# Patient Record
Sex: FEMALE | Race: BLACK OR AFRICAN AMERICAN | Employment: UNEMPLOYED | URBAN - METROPOLITAN AREA
[De-identification: names, ages, dates, MRNs, and addresses within clinical notes are randomized per-mention and may not be internally consistent; named-entity substitution may affect disease eponyms.]

---

## 2016-08-01 LAB
ANTIBODY SCREEN, EXTERNAL: NEGATIVE
HBSAG, EXTERNAL: NEGATIVE
HIV, EXTERNAL: NORMAL
RPR, EXTERNAL: NON REACTIVE
RUBELLA, EXTERNAL: NORMAL
TYPE, ABO & RH, EXTERNAL: NORMAL

## 2017-01-04 ENCOUNTER — HOSPITAL ENCOUNTER (EMERGENCY)
Age: 32
Discharge: HOME | End: 2017-01-04
Attending: EMERGENCY MEDICINE
Payer: MEDICAID

## 2017-01-04 VITALS
SYSTOLIC BLOOD PRESSURE: 136 MMHG | DIASTOLIC BLOOD PRESSURE: 76 MMHG | OXYGEN SATURATION: 100 % | TEMPERATURE: 97.6 F | HEIGHT: 66 IN | WEIGHT: 253.75 LBS | RESPIRATION RATE: 16 BRPM | BODY MASS INDEX: 40.78 KG/M2 | HEART RATE: 90 BPM

## 2017-01-04 DIAGNOSIS — R04.0 NOSEBLEED: ICD-10-CM

## 2017-01-04 DIAGNOSIS — J20.9 ACUTE BRONCHITIS, UNSPECIFIED ORGANISM: Primary | ICD-10-CM

## 2017-01-04 PROCEDURE — 94640 AIRWAY INHALATION TREATMENT: CPT

## 2017-01-04 PROCEDURE — 77030029684 HC NEB SM VOL KT MONA -A

## 2017-01-04 PROCEDURE — 99283 EMERGENCY DEPT VISIT LOW MDM: CPT

## 2017-01-04 PROCEDURE — 74011000250 HC RX REV CODE- 250: Performed by: PHYSICIAN ASSISTANT

## 2017-01-04 RX ORDER — CODEINE PHOSPHATE AND GUAIFENESIN 10; 100 MG/5ML; MG/5ML
5 SOLUTION ORAL
Qty: 120 ML | Refills: 0 | Status: SHIPPED | OUTPATIENT
Start: 2017-01-04 | End: 2017-01-11 | Stop reason: SDUPTHER

## 2017-01-04 RX ORDER — AZITHROMYCIN 250 MG/1
TABLET, FILM COATED ORAL
Qty: 6 TAB | Refills: 0 | Status: SHIPPED | OUTPATIENT
Start: 2017-01-04 | End: 2017-01-11 | Stop reason: SDUPTHER

## 2017-01-04 RX ORDER — IPRATROPIUM BROMIDE AND ALBUTEROL SULFATE 2.5; .5 MG/3ML; MG/3ML
3 SOLUTION RESPIRATORY (INHALATION)
Status: COMPLETED | OUTPATIENT
Start: 2017-01-04 | End: 2017-01-04

## 2017-01-04 RX ADMIN — IPRATROPIUM BROMIDE AND ALBUTEROL SULFATE 3 ML: .5; 3 SOLUTION RESPIRATORY (INHALATION) at 15:08

## 2017-01-04 NOTE — ED NOTES
WAYNE Millan has reviewed discharge instructions with the patient. The patient verbalized understanding. Patient ambulatory to car.

## 2017-01-04 NOTE — DISCHARGE INSTRUCTIONS
Bronchitis: Care Instructions  Your Care Instructions    Bronchitis is inflammation of the bronchial tubes, which carry air to the lungs. The tubes swell and produce mucus, or phlegm. The mucus and inflamed bronchial tubes make you cough. You may have trouble breathing. Most cases of bronchitis are caused by viruses like those that cause colds. Antibiotics usually do not help and they may be harmful. Bronchitis usually develops rapidly and lasts about 2 to 3 weeks in otherwise healthy people. Follow-up care is a key part of your treatment and safety. Be sure to make and go to all appointments, and call your doctor if you are having problems. It's also a good idea to know your test results and keep a list of the medicines you take. How can you care for yourself at home? · Take all medicines exactly as prescribed. Call your doctor if you think you are having a problem with your medicine. · Get some extra rest.  · Take an over-the-counter pain medicine, such as acetaminophen (Tylenol), ibuprofen (Advil, Motrin), or naproxen (Aleve) to reduce fever and relieve body aches. Read and follow all instructions on the label. · Do not take two or more pain medicines at the same time unless the doctor told you to. Many pain medicines have acetaminophen, which is Tylenol. Too much acetaminophen (Tylenol) can be harmful. · Take an over-the-counter cough medicine that contains dextromethorphan to help quiet a dry, hacking cough so that you can sleep. Avoid cough medicines that have more than one active ingredient. Read and follow all instructions on the label. · Breathe moist air from a humidifier, hot shower, or sink filled with hot water. The heat and moisture will thin mucus so you can cough it out. · Do not smoke. Smoking can make bronchitis worse. If you need help quitting, talk to your doctor about stop-smoking programs and medicines. These can increase your chances of quitting for good.   When should you call for help? Call 911 anytime you think you may need emergency care. For example, call if:  · You have severe trouble breathing. Call your doctor now or seek immediate medical care if:  · You have new or worse trouble breathing. · You cough up dark brown or bloody mucus (sputum). · You have a new or higher fever. · You have a new rash. Watch closely for changes in your health, and be sure to contact your doctor if:  · You cough more deeply or more often, especially if you notice more mucus or a change in the color of your mucus. · You are not getting better as expected. Where can you learn more? Go to http://sheree-frank.info/. Enter H333 in the search box to learn more about \"Bronchitis: Care Instructions. \"  Current as of: May 23, 2016  Content Version: 11.1  © 2462-9835 [employer]. Care instructions adapted under license by [employer] (which disclaims liability or warranty for this information). If you have questions about a medical condition or this instruction, always ask your healthcare professional. [place] [address] any warranty or liability for your use of this information. Nosebleeds: Care Instructions  Your Care Instructions    Nosebleeds are common, especially if you have colds or allergies. Many things can cause a nosebleed. Some nosebleeds stop on their own with pressure. Others need packing. Some get cauterized (sealed). If you have gauze or other packing materials in your nose, you will need to follow up with your doctor to have the packing removed. You may need more treatment if you get nosebleeds a lot. The doctor has checked you carefully, but problems can develop later. If you notice any problems or new symptoms, get medical treatment right away. Follow-up care is a key part of your treatment and safety. Be sure to make and go to all appointments, and call your doctor if you are having problems.  It's also a good idea to know your test results and keep a list of the medicines you take. How can you care for yourself at home? · If you get another nosebleed:  ¨ Sit up and tilt your head slightly forward. This keeps blood from going down your throat. ¨ Use your thumb and index finger to pinch your nose shut for 10 minutes. Use a clock. Do not check to see if the bleeding has stopped before the 10 minutes are up. If the bleeding has not stopped, pinch your nose shut for another 10 minutes. ¨ When the bleeding has stopped, try not to pick, rub, or blow your nose for 12 hours. Avoiding these things helps keep your nose from bleeding again. · If your doctor prescribed antibiotics, take them as directed. Do not stop taking them just because you feel better. You need to take the full course of antibiotics. To prevent nosebleeds  · Do not blow your nose too hard. · Try not to lift or strain after a nosebleed. · Raise your head on a pillow while you sleep. · Put a thin layer of a saline- or water-based nasal gel, such as NasoGel, inside your nose. Put it on the septum, which divides your nostrils. This will prevent dryness that can cause nosebleeds. · Use a vaporizer or humidifier to add moisture to your bedroom. Follow the directions for cleaning the machine. · Do not use aspirin, ibuprofen (Advil, Motrin), or naproxen (Aleve) for 36 to 48 hours after a nosebleed unless your doctor tells you to. You can use acetaminophen (Tylenol) for pain relief. · Talk to your doctor about stopping any other medicines you are taking. Some medicines may make you more likely to get a nosebleed. · Do not use cold medicines or nasal sprays without first talking to your doctor. They can make your nose dry. When should you call for help? Call 911 anytime you think you may need emergency care. For example, call if:  · You passed out (lost consciousness).   Call your doctor now or seek immediate medical care if:  · You get another nosebleed and your nose is still bleeding after you have applied pressure 3 times for 10 minutes each time (30 minutes total). · There is a lot of blood running down the back of your throat even after you pinch your nose and tilt your head forward. · You have a fever. · You have sinus pain. Watch closely for changes in your health, and be sure to contact your doctor if:  · You get nosebleeds often, even if they stop. · You do not get better as expected. Where can you learn more? Go to http://sheree-frank.info/. Enter S156 in the search box to learn more about \"Nosebleeds: Care Instructions. \"  Current as of: May 27, 2016  Content Version: 11.1  © 9702-7682 [employer]. Care instructions adapted under license by [employer] (which disclaims liability or warranty for this information). If you have questions about a medical condition or this instruction, always ask your healthcare professional. [place] [address] any warranty or liability for your use of this information.

## 2017-01-04 NOTE — ED NOTES
Patient c/o 3 day h/o intermittent right nare epistaxis with clots. Patient also with shortness of breath and productive cough. Patient 7 months pregnant, c/o abdominal \"aching\" to bilateral sides.

## 2017-01-05 ENCOUNTER — HOSPITAL ENCOUNTER (INPATIENT)
Age: 32
LOS: 2 days | Discharge: HOME | DRG: 566 | End: 2017-01-09
Attending: EMERGENCY MEDICINE | Admitting: OBSTETRICS & GYNECOLOGY
Payer: MEDICAID

## 2017-01-05 DIAGNOSIS — Z3A.28 28 WEEKS GESTATION OF PREGNANCY: ICD-10-CM

## 2017-01-05 DIAGNOSIS — R04.0 EPISTAXIS: Primary | ICD-10-CM

## 2017-01-05 DIAGNOSIS — J45.41 MODERATE PERSISTENT ASTHMA WITH ACUTE EXACERBATION: ICD-10-CM

## 2017-01-05 LAB
ALBUMIN SERPL BCP-MCNC: 2.4 G/DL (ref 3.5–5)
ALBUMIN/GLOB SERPL: 0.6 {RATIO} (ref 1.1–2.2)
ALP SERPL-CCNC: 76 U/L (ref 45–117)
ALT SERPL-CCNC: 69 U/L (ref 12–78)
ANION GAP BLD CALC-SCNC: 8 MMOL/L (ref 5–15)
APPEARANCE UR: ABNORMAL
AST SERPL W P-5'-P-CCNC: 29 U/L (ref 15–37)
BACTERIA URNS QL MICRO: ABNORMAL /HPF
BASOPHILS # BLD AUTO: 0 K/UL (ref 0–0.1)
BASOPHILS # BLD: 0 % (ref 0–1)
BILIRUB SERPL-MCNC: 0.4 MG/DL (ref 0.2–1)
BILIRUB UR QL: NEGATIVE
BUN SERPL-MCNC: 5 MG/DL (ref 6–20)
BUN/CREAT SERPL: 9 (ref 12–20)
CALCIUM SERPL-MCNC: 8.6 MG/DL (ref 8.5–10.1)
CHLORIDE SERPL-SCNC: 106 MMOL/L (ref 97–108)
CO2 SERPL-SCNC: 27 MMOL/L (ref 21–32)
COLOR UR: ABNORMAL
CREAT SERPL-MCNC: 0.53 MG/DL (ref 0.55–1.02)
EOSINOPHIL # BLD: 0.1 K/UL (ref 0–0.4)
EOSINOPHIL NFR BLD: 1 % (ref 0–7)
EPITH CASTS URNS QL MICRO: ABNORMAL /LPF
ERYTHROCYTE [DISTWIDTH] IN BLOOD BY AUTOMATED COUNT: 14 % (ref 11.5–14.5)
FLUAV AG NPH QL IA: NEGATIVE
FLUBV AG NOSE QL IA: NEGATIVE
GLOBULIN SER CALC-MCNC: 4.2 G/DL (ref 2–4)
GLUCOSE BLD STRIP.AUTO-MCNC: 233 MG/DL (ref 65–100)
GLUCOSE SERPL-MCNC: 112 MG/DL (ref 65–100)
GLUCOSE UR STRIP.AUTO-MCNC: NEGATIVE MG/DL
HCT VFR BLD AUTO: 29.8 % (ref 35–47)
HGB BLD-MCNC: 9.2 G/DL (ref 11.5–16)
HGB UR QL STRIP: NEGATIVE
HYALINE CASTS URNS QL MICRO: ABNORMAL /LPF (ref 0–5)
INR PPP: 0.9 (ref 0.9–1.1)
KETONES UR QL STRIP.AUTO: NEGATIVE MG/DL
LEUKOCYTE ESTERASE UR QL STRIP.AUTO: NEGATIVE
LYMPHOCYTES # BLD AUTO: 26 % (ref 12–49)
LYMPHOCYTES # BLD: 2.8 K/UL (ref 0.8–3.5)
MCH RBC QN AUTO: 24.8 PG (ref 26–34)
MCHC RBC AUTO-ENTMCNC: 30.9 G/DL (ref 30–36.5)
MCV RBC AUTO: 80.3 FL (ref 80–99)
MONOCYTES # BLD: 0.6 K/UL (ref 0–1)
MONOCYTES NFR BLD AUTO: 6 % (ref 5–13)
NEUTS SEG # BLD: 7.3 K/UL (ref 1.8–8)
NEUTS SEG NFR BLD AUTO: 67 % (ref 32–75)
NITRITE UR QL STRIP.AUTO: NEGATIVE
PH UR STRIP: 7.5 [PH] (ref 5–8)
PLATELET # BLD AUTO: 272 K/UL (ref 150–400)
POTASSIUM SERPL-SCNC: 3.9 MMOL/L (ref 3.5–5.1)
PROT SERPL-MCNC: 6.6 G/DL (ref 6.4–8.2)
PROT UR STRIP-MCNC: NEGATIVE MG/DL
PROTHROMBIN TIME: 9.1 SEC (ref 9–11.1)
RBC # BLD AUTO: 3.71 M/UL (ref 3.8–5.2)
RBC #/AREA URNS HPF: ABNORMAL /HPF (ref 0–5)
SERVICE CMNT-IMP: ABNORMAL
SODIUM SERPL-SCNC: 141 MMOL/L (ref 136–145)
SP GR UR REFRACTOMETRY: 1.01 (ref 1–1.03)
UA: UC IF INDICATED,UAUC: ABNORMAL
UROBILINOGEN UR QL STRIP.AUTO: 1 EU/DL (ref 0.2–1)
WBC # BLD AUTO: 10.9 K/UL (ref 3.6–11)
WBC URNS QL MICRO: ABNORMAL /HPF (ref 0–4)

## 2017-01-05 PROCEDURE — 74011250636 HC RX REV CODE- 250/636: Performed by: EMERGENCY MEDICINE

## 2017-01-05 PROCEDURE — 74011636637 HC RX REV CODE- 636/637: Performed by: EMERGENCY MEDICINE

## 2017-01-05 PROCEDURE — 74011000250 HC RX REV CODE- 250: Performed by: EMERGENCY MEDICINE

## 2017-01-05 PROCEDURE — 99285 EMERGENCY DEPT VISIT HI MDM: CPT

## 2017-01-05 PROCEDURE — 94640 AIRWAY INHALATION TREATMENT: CPT

## 2017-01-05 PROCEDURE — 81001 URINALYSIS AUTO W/SCOPE: CPT | Performed by: EMERGENCY MEDICINE

## 2017-01-05 PROCEDURE — 87086 URINE CULTURE/COLONY COUNT: CPT | Performed by: EMERGENCY MEDICINE

## 2017-01-05 PROCEDURE — 74011636637 HC RX REV CODE- 636/637: Performed by: INTERNAL MEDICINE

## 2017-01-05 PROCEDURE — 77030013140 HC MSK NEB VYRM -A

## 2017-01-05 PROCEDURE — 74011250637 HC RX REV CODE- 250/637: Performed by: EMERGENCY MEDICINE

## 2017-01-05 PROCEDURE — 82962 GLUCOSE BLOOD TEST: CPT

## 2017-01-05 PROCEDURE — 99218 HC RM OBSERVATION: CPT

## 2017-01-05 PROCEDURE — 85610 PROTHROMBIN TIME: CPT | Performed by: INTERNAL MEDICINE

## 2017-01-05 PROCEDURE — 85025 COMPLETE CBC W/AUTO DIFF WBC: CPT | Performed by: EMERGENCY MEDICINE

## 2017-01-05 PROCEDURE — 96365 THER/PROPH/DIAG IV INF INIT: CPT

## 2017-01-05 PROCEDURE — 4A1HX4Z MONITORING OF PRODUCTS OF CONCEPTION, CARDIAC ELECTRICAL ACTIVITY, EXTERNAL APPROACH: ICD-10-PCS | Performed by: SPECIALIST

## 2017-01-05 PROCEDURE — 74011000250 HC RX REV CODE- 250: Performed by: INTERNAL MEDICINE

## 2017-01-05 PROCEDURE — 36415 COLL VENOUS BLD VENIPUNCTURE: CPT | Performed by: EMERGENCY MEDICINE

## 2017-01-05 PROCEDURE — 87804 INFLUENZA ASSAY W/OPTIC: CPT | Performed by: EMERGENCY MEDICINE

## 2017-01-05 PROCEDURE — 96372 THER/PROPH/DIAG INJ SC/IM: CPT

## 2017-01-05 PROCEDURE — 80053 COMPREHEN METABOLIC PANEL: CPT | Performed by: EMERGENCY MEDICINE

## 2017-01-05 RX ORDER — MAGNESIUM SULFATE 100 %
4 CRYSTALS MISCELLANEOUS AS NEEDED
Status: DISCONTINUED | OUTPATIENT
Start: 2017-01-05 | End: 2017-01-09 | Stop reason: HOSPADM

## 2017-01-05 RX ORDER — ALBUTEROL SULFATE 0.83 MG/ML
5 SOLUTION RESPIRATORY (INHALATION)
Status: COMPLETED | OUTPATIENT
Start: 2017-01-05 | End: 2017-01-05

## 2017-01-05 RX ORDER — OXYMETAZOLINE HCL 0.05 %
2 SPRAY, NON-AEROSOL (ML) NASAL
Status: COMPLETED | OUTPATIENT
Start: 2017-01-05 | End: 2017-01-05

## 2017-01-05 RX ORDER — DEXTROSE 50 % IN WATER (D50W) INTRAVENOUS SYRINGE
12.5-25 AS NEEDED
Status: DISCONTINUED | OUTPATIENT
Start: 2017-01-05 | End: 2017-01-09 | Stop reason: HOSPADM

## 2017-01-05 RX ORDER — SODIUM CHLORIDE 0.9 % (FLUSH) 0.9 %
5-10 SYRINGE (ML) INJECTION EVERY 8 HOURS
Status: DISCONTINUED | OUTPATIENT
Start: 2017-01-05 | End: 2017-01-09 | Stop reason: HOSPADM

## 2017-01-05 RX ORDER — PREDNISONE 10 MG/1
40 TABLET ORAL
Status: COMPLETED | OUTPATIENT
Start: 2017-01-05 | End: 2017-01-05

## 2017-01-05 RX ORDER — ALBUTEROL SULFATE 0.83 MG/ML
2.5 SOLUTION RESPIRATORY (INHALATION)
Status: COMPLETED | OUTPATIENT
Start: 2017-01-05 | End: 2017-01-05

## 2017-01-05 RX ORDER — MAGNESIUM SULFATE HEPTAHYDRATE 40 MG/ML
2 INJECTION, SOLUTION INTRAVENOUS
Status: COMPLETED | OUTPATIENT
Start: 2017-01-05 | End: 2017-01-05

## 2017-01-05 RX ORDER — ACETAMINOPHEN 325 MG/1
650 TABLET ORAL
Status: COMPLETED | OUTPATIENT
Start: 2017-01-05 | End: 2017-01-05

## 2017-01-05 RX ORDER — SODIUM CHLORIDE 0.9 % (FLUSH) 0.9 %
5-10 SYRINGE (ML) INJECTION AS NEEDED
Status: DISCONTINUED | OUTPATIENT
Start: 2017-01-05 | End: 2017-01-09 | Stop reason: HOSPADM

## 2017-01-05 RX ORDER — INSULIN LISPRO 100 [IU]/ML
INJECTION, SOLUTION INTRAVENOUS; SUBCUTANEOUS
Status: DISCONTINUED | OUTPATIENT
Start: 2017-01-05 | End: 2017-01-06

## 2017-01-05 RX ORDER — ACETAMINOPHEN 325 MG/1
650 TABLET ORAL
Status: DISCONTINUED | OUTPATIENT
Start: 2017-01-05 | End: 2017-01-09 | Stop reason: HOSPADM

## 2017-01-05 RX ORDER — ZOLPIDEM TARTRATE 5 MG/1
5 TABLET ORAL
Status: DISCONTINUED | OUTPATIENT
Start: 2017-01-05 | End: 2017-01-09 | Stop reason: HOSPADM

## 2017-01-05 RX ORDER — ALBUTEROL SULFATE 0.83 MG/ML
1.25 SOLUTION RESPIRATORY (INHALATION)
Status: DISCONTINUED | OUTPATIENT
Start: 2017-01-05 | End: 2017-01-09 | Stop reason: HOSPADM

## 2017-01-05 RX ADMIN — ALBUTEROL SULFATE 2.5 MG: 2.5 SOLUTION RESPIRATORY (INHALATION) at 15:56

## 2017-01-05 RX ADMIN — ACETAMINOPHEN 650 MG: 325 TABLET, FILM COATED ORAL at 15:55

## 2017-01-05 RX ADMIN — ALBUTEROL SULFATE 5 MG: 2.5 SOLUTION RESPIRATORY (INHALATION) at 17:59

## 2017-01-05 RX ADMIN — PREDNISONE 40 MG: 10 TABLET ORAL at 17:54

## 2017-01-05 RX ADMIN — ALBUTEROL SULFATE 1.25 MG: 2.5 SOLUTION RESPIRATORY (INHALATION) at 21:42

## 2017-01-05 RX ADMIN — SODIUM CHLORIDE 1000 ML: 900 INJECTION, SOLUTION INTRAVENOUS at 19:01

## 2017-01-05 RX ADMIN — INSULIN LISPRO 3 UNITS: 100 INJECTION, SOLUTION INTRAVENOUS; SUBCUTANEOUS at 22:40

## 2017-01-05 RX ADMIN — OXYMETAZOLINE HYDROCHLORIDE 2 SPRAY: 5 SPRAY NASAL at 18:27

## 2017-01-05 RX ADMIN — ALBUTEROL SULFATE 2.5 MG: 2.5 SOLUTION RESPIRATORY (INHALATION) at 19:01

## 2017-01-05 RX ADMIN — MAGNESIUM SULFATE HEPTAHYDRATE 2 G: 40 INJECTION, SOLUTION INTRAVENOUS at 19:12

## 2017-01-05 NOTE — Clinical Note
Patient Class[de-identified] Observation [353] Type of Bed: L&D [7] Reason for Observation: Asthma , nose bleed Admitting Physician: Rachel Villatoro [[id number]] Attending Physician: Rachel Villatoro [[id number]] Diagnosis: 28 weeks GA, Epistac

## 2017-01-05 NOTE — IP AVS SNAPSHOT
Current Discharge Medication List  
  
ASK your doctor about these medications Dose & Instructions Dispensing Information Comments Morning Noon Evening Bedtime  
 albuterol 90 mcg/actuation inhaler Commonly known as:  PROAIR HFA Your next dose is: Today, Tomorrow Other:  ____________ Dose:  2 Puff Take 2 Puffs by inhalation every four (4) hours as needed for Wheezing. Quantity:  1 Inhaler Refills:  5  
     
   
   
   
  
 aspirin 81 mg chewable tablet Your next dose is: Today, Tomorrow Other:  ____________ Dose:  81 mg Take 1 Tab by mouth daily. Quantity:  30 Tab Refills:  5  
     
   
   
   
  
 azithromycin 250 mg tablet Commonly known as:  Gaetana Masker Your next dose is: Today, Tomorrow Other:  ____________ Take 2 tabs today; then take 1 tab daily for 4 days Quantity:  6 Tab Refills:  0 Blood-Glucose Meter monitoring kit Your next dose is: Today, Tomorrow Other:  ____________ Use up to three times per days Quantity:  1 Kit Refills:  0  
     
   
   
   
  
 diphenoxylate-atropine 2.5-0.025 mg per tablet Commonly known as:  LOMOTIL Your next dose is: Today, Tomorrow Other:  ____________ Dose:  1 Tab Take 1 Tab by mouth four (4) times daily as needed for Diarrhea. Max Daily Amount: 4 Tabs. Quantity:  20 Tab Refills:  0  
     
   
   
   
  
 glucose blood VI test strips strip Commonly known as:  ASCENSIA AUTODISC VI, ONE TOUCH ULTRA TEST VI Your next dose is: Today, Tomorrow Other:  ____________ Use up to four times daily Quantity:  100 Strip Refills:  5  
     
   
   
   
  
 glyBURIDE 1.25 mg tablet Commonly known as:  [place] Your next dose is: Today, Tomorrow Other:  ____________ Take 1/2 pill if you have an evening snack Quantity:  30 Tab Refills:  3  
     
   
   
   
  
 guaiFENesin-codeine 100-10 mg/5 mL solution Commonly known as:  ROBITUSSIN AC Your next dose is: Today, Tomorrow Other:  ____________ Dose:  5 mL Take 5 mL by mouth four (4) times daily as needed for Cough for up to 7 days. Max Daily Amount: 20 mL. Quantity:  120 mL Refills:  0  
     
   
   
   
  
 labetalol 100 mg tablet Commonly known as:  Yaritza Runner Your next dose is: Today, Tomorrow Other:  ____________ Dose:  100 mg Take 100 mg by mouth two (2) times a day. Refills:  0 Lancets Misc Your next dose is: Today, Tomorrow Other:  ____________ Use up to three times per days;  
 Quantity:  1 Each Refills:  11  
     
   
   
   
  
 metFORMIN 1,000 mg tablet Commonly known as:  GLUCOPHAGE Your next dose is: Today, Tomorrow Other:  ____________ Dose:  1000 mg Take 1 Tab by mouth two (2) times a day. Quantity:  60 Tab Refills:  5 PEN NEEDLE 31 gauge x 5/16\" Ndle Generic drug:  Insulin Needles (Disposable) Your next dose is: Today, Tomorrow Other:  ____________ Refills:  0  
     
   
   
   
  
 prenatal vitamin 27 mg iron- 800 mcg Tab tablet Your next dose is: Today, Tomorrow Other:  ____________  
   
   
 daily. Refills:  0 SITagliptin 100 mg tablet Commonly known as:  Tierra Racer Your next dose is: Today, Tomorrow Other:  ____________ Dose:  100 mg Take 1 Tab by mouth daily. Appointment needed to refill this medication again. Quantity:  30 Tab Refills:  5

## 2017-01-05 NOTE — IP AVS SNAPSHOT
Summary of Care Report The Summary of Care report has been created to help improve care coordination. Users with access to [employer] or [address] (Web-based application) may access additional patient information including the Discharge Summary. If you are not currently a [address] user and need more information, please call the number listed below in the [place] [address] section and ask to be connected with Medical Records. Facility Information Name Address Phone [address] [address] [phone] Patient Information Patient Name Sex  Prachi Pratt ([id number]) Female 1985 Discharge Information Admitting Provider Service Area Unit Tess Chen MD / [phone] [address] [facility] [address] [facility] / [phone] Discharge Provider Discharge Date/Time Discharge Disposition Destination Laurel Nunes MD / [phone] 2017 (Pending) AHR (none) Patient Language Language ENGLISH [13] Problem List as of 2017  Date Reviewed: 2016 Codes Priority Class Noted - Resolved Hyperglycemia due to type 2 diabetes mellitus ([facility] ICD-10-CM: E11.65 ICD-9-CM: 250.00   3/23/2016 - Present Hypertension ICD-10-CM: I10 
ICD-9-CM: 401.9   3/23/2016 - Present Asthma ICD-10-CM: D53.410 ICD-9-CM: 493.90   3/23/2016 - Present Bleeding nose ICD-10-CM: R04.0 ICD-9-CM: 784.7   2017 - Present You are allergic to the following Allergen Reactions Mirapex (Pramipexole) Drowsiness Morphine Rash Tramadol Other (comments)  
 headache Current Discharge Medication List  
  
ASK your doctor about these medications Dose & Instructions Dispensing Information Comments  
 albuterol 90 mcg/actuation inhaler Commonly known as:  PROAIR HFA  
 Dose:  2 Puff Take 2 Puffs by inhalation every four (4) hours as needed for Wheezing. Quantity:  1 Inhaler Refills:  5  
   
 aspirin 81 mg chewable tablet Dose:  81 mg Take 1 Tab by mouth daily. Quantity:  30 Tab Refills:  5  
   
 azithromycin 250 mg tablet Commonly known as:  Cate Smith Take 2 tabs today; then take 1 tab daily for 4 days Quantity:  6 Tab Refills:  0 Blood-Glucose Meter monitoring kit Use up to three times per days Quantity:  1 Kit Refills:  0  
   
 diphenoxylate-atropine 2.5-0.025 mg per tablet Commonly known as:  LOMOTIL Dose:  1 Tab Take 1 Tab by mouth four (4) times daily as needed for Diarrhea. Max Daily Amount: 4 Tabs. Quantity:  20 Tab Refills:  0  
   
 glucose blood VI test strips strip Commonly known as:  ASCENSIA AUTODISC VI, ONE TOUCH ULTRA TEST VI  
 Use up to four times daily Quantity:  100 Strip Refills:  5  
   
 glyBURIDE 1.25 mg tablet Commonly known as:  [place] Take 1/2 pill if you have an evening snack Quantity:  30 Tab Refills:  3  
   
 guaiFENesin-codeine 100-10 mg/5 mL solution Commonly known as:  ROBITUSSIN AC Dose:  5 mL Take 5 mL by mouth four (4) times daily as needed for Cough for up to 7 days. Max Daily Amount: 20 mL. Quantity:  120 mL Refills:  0  
   
 labetalol 100 mg tablet Commonly known as:  Loera Corns Dose:  100 mg Take 100 mg by mouth two (2) times a day. Refills:  0 Lancets Misc Use up to three times per days;  
 Quantity:  1 Each Refills:  11  
   
 metFORMIN 1,000 mg tablet Commonly known as:  GLUCOPHAGE Dose:  1000 mg Take 1 Tab by mouth two (2) times a day. Quantity:  60 Tab Refills:  5 PEN NEEDLE 31 gauge x 5/16\" Ndle Generic drug:  Insulin Needles (Disposable) Refills:  0  
   
 prenatal vitamin 27 mg iron- 800 mcg Tab tablet  
 daily. Refills:  0 SITagliptin 100 mg tablet Commonly known as:  Stan Dick Dose:  100 mg Take 1 Tab by mouth daily. Appointment needed to refill this medication again. Quantity:  30 Tab Refills:  5 Current Immunizations Name Date Tdap 2016 Follow-up Information Follow up With Details Comments Contact Info Provider Unknown   Patient not available to ask Valery Mckinley MD   [address] [facility] [place] 
[phone] Discharge Instructions Weeks 26 to 30 of Your Pregnancy: Care Instructions Your Care Instructions You are now in your last trimester of pregnancy. Your baby is growing rapidly. And you'll probably feel your baby moving around more often. Your doctor may ask you to count your baby's kicks. Your back may ache as your body gets used to your baby's size and length. If you haven't already had the Tdap shot during this pregnancy, talk to your doctor about getting it. It will help protect your  against pertussis infection. During this time, it's important to take care of yourself and pay attention to what your body needs. If you feel sexual, explore ways to be close with your partner that match your comfort and desire. Use the tips provided in this care sheet to find ways to be sexual in your own way. Follow-up care is a key part of your treatment and safety. Be sure to make and go to all appointments, and call your doctor if you are having problems. It's also a good idea to know your test results and keep a list of the medicines you take. How can you care for yourself at home? Take it easy at work · Take frequent breaks. If possible, stop working when you are tired, and rest during your lunch hour. · Take bathroom breaks every 2 hours. · Change positions often. If you sit for long periods, stand up and walk around. · When you stand for a long time, keep one foot on a low stool with your knee bent. After standing a lot, sit with your feet up. · Avoid fumes, chemicals, and tobacco smoke. Be sexual in your own way · Having sex during pregnancy is okay, unless your doctor tells you not to. · You may be very interested in sex, or you may have no interest at all. · Your growing belly can make it hard to find a good position during intercourse. [place] and explore. · You may get cramps in your uterus when your partner touches your breasts. · A back rub may relieve the backache or cramps that sometimes follow orgasm. Learn about  labor · Watch for signs of  labor. You may be going into labor if: 
¨ You have menstrual-like cramps, with or without nausea. ¨ You have about 4 or more contractions in 20 minutes, or about 8 or more within 1 hour, even after you have had a glass of water and are resting. ¨ You have a low, dull backache that does not go away when you change your position. ¨ You have pain or pressure in your pelvis that comes and goes in a pattern. ¨ You have intestinal cramping or flu-like symptoms, with or without diarrhea. ¨ You notice an increase or change in your vaginal discharge. Discharge may be heavy, mucus-like, watery, or streaked with blood. ¨ Your water breaks. · If you think you have  labor: ¨ Drink 2 or 3 glasses of water or juice. Not drinking enough fluids can cause contractions. ¨ Stop what you are doing, and empty your bladder. Then lie down on your left side for at least 1 hour. ¨ While lying on your side, find your breast bone. Put your fingers in the soft spot just below it. Move your fingers down toward your belly button to find the top of your uterus. Check to see if it is tight. ¨ Contractions can be weak or strong. Record your contractions for an hour. Time a contraction from the start of one contraction to the start of the next one. ¨ Single or several strong contractions without a pattern are called [place]garry contractions.  They are practice contractions but not the start of labor. They often stop if you change what you are doing. ¨ Call your doctor if you have regular contractions. Where can you learn more? Go to http://sheree-frank.info/. Enter K172 in the search box to learn more about \"Weeks 26 to 30 of Your Pregnancy: Care Instructions. \" Current as of: May 30, 2016 Content Version: 11.1 © 1039-6184 [employer]. Care instructions adapted under license by [employer] (which disclaims liability or warranty for this information). If you have questions about a medical condition or this instruction, always ask your healthcare professional. [place] [address] any warranty or liability for your use of this information. Nosebleeds: Care Instructions Your Care Instructions Nosebleeds are common, especially if you have colds or allergies. Many things can cause a nosebleed. Some nosebleeds stop on their own with pressure. Others need packing. Some get cauterized (sealed). If you have gauze or other packing materials in your nose, you will need to follow up with your doctor to have the packing removed. You may need more treatment if you get nosebleeds a lot. The doctor has checked you carefully, but problems can develop later. If you notice any problems or new symptoms, get medical treatment right away. Follow-up care is a key part of your treatment and safety. Be sure to make and go to all appointments, and call your doctor if you are having problems. It's also a good idea to know your test results and keep a list of the medicines you take. How can you care for yourself at home? · If you get another nosebleed: ¨ Sit up and tilt your head slightly forward. This keeps blood from going down your throat. ¨ Use your thumb and index finger to pinch your nose shut for 10 minutes. Use a clock.  Do not check to see if the bleeding has stopped before the 10 minutes are up. If the bleeding has not stopped, pinch your nose shut for another 10 minutes. ¨ When the bleeding has stopped, try not to pick, rub, or blow your nose for 12 hours. Avoiding these things helps keep your nose from bleeding again. · If your doctor prescribed antibiotics, take them as directed. Do not stop taking them just because you feel better. You need to take the full course of antibiotics. To prevent nosebleeds · Do not blow your nose too hard. · Try not to lift or strain after a nosebleed. · Raise your head on a pillow while you sleep. · Put a thin layer of a saline- or water-based nasal gel, such as NasoGel, inside your nose. Put it on the septum, which divides your nostrils. This will prevent dryness that can cause nosebleeds. · Use a vaporizer or humidifier to add moisture to your bedroom. Follow the directions for cleaning the machine. · Do not use aspirin, ibuprofen (Advil, Motrin), or naproxen (Aleve) for 36 to 48 hours after a nosebleed unless your doctor tells you to. You can use acetaminophen (Tylenol) for pain relief. · Talk to your doctor about stopping any other medicines you are taking. Some medicines may make you more likely to get a nosebleed. · Do not use cold medicines or nasal sprays without first talking to your doctor. They can make your nose dry. When should you call for help? Call 911 anytime you think you may need emergency care. For example, call if: 
· You passed out (lost consciousness). Call your doctor now or seek immediate medical care if: 
· You get another nosebleed and your nose is still bleeding after you have applied pressure 3 times for 10 minutes each time (30 minutes total). · There is a lot of blood running down the back of your throat even after you pinch your nose and tilt your head forward. · You have a fever. · You have sinus pain. Watch closely for changes in your health, and be sure to contact your doctor if: · You get nosebleeds often, even if they stop. · You do not get better as expected. Where can you learn more? Go to http://sheree-frank.info/. Enter S156 in the search box to learn more about \"Nosebleeds: Care Instructions. \" Current as of: May 27, 2016 Content Version: 11.1 © 5342-1898 [employer]. Care instructions adapted under license by [employer] (which disclaims liability or warranty for this information). If you have questions about a medical condition or this instruction, always ask your healthcare professional. [place] [address] any warranty or liability for your use of this information. Chart Review Routing History Recipient Method Report Sent By [facility] Gale Cárdenas MD  
Phone: [phone] In [address] [place] [address] MD Anthony [[id number]] 8/23/2016  8:51 AM   
 Provider Unknown, MD  
Patient not available to ask [address] Mail IP Auto Routed Notes Marcie Sahu MD [[id number]] 1/6/2017  8:33 AM 01/06/2017

## 2017-01-05 NOTE — IP AVS SNAPSHOT
[address] [facility] 
[phone] Patient: Bhargavi Zhong MRN: [id number] :1985 You are allergic to the following Allergen Reactions Mirapex (Pramipexole) Drowsiness Morphine Rash Tramadol Other (comments)  
 headache Recent Documentation Height Weight BMI OB Status Smoking Status 1.651 m 115.6 kg 42.41 kg/m2 Pregnant Former Smoker Emergency Contacts Name Discharge Info Relation Home Work Mobile Scooter Warner  Other Relative [1] [phone] About your hospitalization You were admitted on:  2017 You last received care in the:  [facility] You were discharged on:  2017 Unit phone number:  [phone] Why you were hospitalized Your primary diagnosis was:  Not on File Your diagnoses also included:  Bleeding Nose Providers Seen During Your Hospitalizations Provider Role Specialty Primary office phone Jarrett Prather MD Attending Provider Emergency Medicine [phone] Tawny Enciso MD Attending Provider Obstetrics & Gynecology [phone] Your Primary Care Physician (PCP) Primary Care Physician Office Phone Office Fax UNKNOWN, PROVIDER ** None ** ** None ** Follow-up Information Follow up With Details Comments Contact Info Provider Unknown   Patient not available to ask Tawny Enciso MD   [address] [place] [address] [address] [facility] 
[phone] Your Appointments 2017 12:30 PM EST Follow Up with Baron Garay MD  
[facility] [address] [place] [address]) [address] [address] [phone] Current Discharge Medication List  
  
ASK your doctor about these medications Dose & Instructions Dispensing Information Comments Morning Noon Evening Bedtime  
 albuterol 90 mcg/actuation inhaler Commonly known as:  PROAIR HFA Your next dose is: Today, Tomorrow Other:  _________ Dose:  2 Puff Take 2 Puffs by inhalation every four (4) hours as needed for Wheezing. Quantity:  1 Inhaler Refills:  5  
     
   
   
   
  
 aspirin 81 mg chewable tablet Your next dose is: Today, Tomorrow Other:  _________ Dose:  81 mg Take 1 Tab by mouth daily. Quantity:  30 Tab Refills:  5  
     
   
   
   
  
 azithromycin 250 mg tablet Commonly known as:  Manual Kidney Your next dose is: Today, Tomorrow Other:  _________ Take 2 tabs today; then take 1 tab daily for 4 days Quantity:  6 Tab Refills:  0 Blood-Glucose Meter monitoring kit Your next dose is: Today, Tomorrow Other:  _________ Use up to three times per days Quantity:  1 Kit Refills:  0  
     
   
   
   
  
 diphenoxylate-atropine 2.5-0.025 mg per tablet Commonly known as:  LOMOTIL Your next dose is: Today, Tomorrow Other:  _________ Dose:  1 Tab Take 1 Tab by mouth four (4) times daily as needed for Diarrhea. Max Daily Amount: 4 Tabs. Quantity:  20 Tab Refills:  0  
     
   
   
   
  
 glucose blood VI test strips strip Commonly known as:  ASCENSIA AUTODISC VI, ONE TOUCH ULTRA TEST VI Your next dose is: Today, Tomorrow Other:  _________ Use up to four times daily Quantity:  100 Strip Refills:  5  
     
   
   
   
  
 glyBURIDE 1.25 mg tablet Commonly known as:  Kandy Mays Your next dose is: Today, Tomorrow Other:  _________ Take 1/2 pill if you have an evening snack Quantity:  30 Tab Refills:  3  
     
   
   
   
  
 guaiFENesin-codeine 100-10 mg/5 mL solution Commonly known as:  ROBITUSSIN AC  
 Your next dose is: Today, Tomorrow Other:  _________ Dose:  5 mL Take 5 mL by mouth four (4) times daily as needed for Cough for up to 7 days. Max Daily Amount: 20 mL. Quantity:  120 mL Refills:  0  
     
   
   
   
  
 labetalol 100 mg tablet Commonly known as:  Charo Waylon Your next dose is: Today, Tomorrow Other:  _________ Dose:  100 mg Take 100 mg by mouth two (2) times a day. Refills:  0 Lancets Misc Your next dose is: Today, Tomorrow Other:  _________ Use up to three times per days;  
 Quantity:  1 Each Refills:  11  
     
   
   
   
  
 metFORMIN 1,000 mg tablet Commonly known as:  GLUCOPHAGE Your next dose is: Today, Tomorrow Other:  _________ Dose:  1000 mg Take 1 Tab by mouth two (2) times a day. Quantity:  60 Tab Refills:  5 PEN NEEDLE 31 gauge x 5/16\" Ndle Generic drug:  Insulin Needles (Disposable) Your next dose is: Today, Tomorrow Other:  _________ Refills:  0  
     
   
   
   
  
 prenatal vitamin 27 mg iron- 800 mcg Tab tablet Your next dose is: Today, Tomorrow Other:  _________  
   
   
 daily. Refills:  0 SITagliptin 100 mg tablet Commonly known as:  Argenis Bump Your next dose is: Today, Tomorrow Other:  _________ Dose:  100 mg Take 1 Tab by mouth daily. Appointment needed to refill this medication again. Quantity:  30 Tab Refills:  5 Discharge Instructions Weeks 26 to 30 of Your Pregnancy: Care Instructions Your Care Instructions You are now in your last trimester of pregnancy. Your baby is growing rapidly. And you'll probably feel your baby moving around more often. Your doctor may ask you to count your baby's kicks. Your back may ache as your body gets used to your baby's size and length. If you haven't already had the Tdap shot during this pregnancy, talk to your doctor about getting it. It will help protect your  against pertussis infection. During this time, it's important to take care of yourself and pay attention to what your body needs. If you feel sexual, explore ways to be close with your partner that match your comfort and desire. Use the tips provided in this care sheet to find ways to be sexual in your own way. Follow-up care is a key part of your treatment and safety. Be sure to make and go to all appointments, and call your doctor if you are having problems. It's also a good idea to know your test results and keep a list of the medicines you take. How can you care for yourself at home? Take it easy at work · Take frequent breaks. If possible, stop working when you are tired, and rest during your lunch hour. · Take bathroom breaks every 2 hours. · Change positions often. If you sit for long periods, stand up and walk around. · When you stand for a long time, keep one foot on a low stool with your knee bent. After standing a lot, sit with your feet up. · Avoid fumes, chemicals, and tobacco smoke. Be sexual in your own way · Having sex during pregnancy is okay, unless your doctor tells you not to. · You may be very interested in sex, or you may have no interest at all. · Your growing belly can make it hard to find a good position during intercourse. [place] [place] and explore. · You may get cramps in your uterus when your partner touches your breasts. · A back rub may relieve the backache or cramps that sometimes follow orgasm. Learn about  labor · Watch for signs of  labor. You may be going into labor if: 
¨ You have menstrual-like cramps, with or without nausea.  
¨ You have about 4 or more contractions in 20 minutes, or about 8 or more within 1 hour, even after you have had a glass of water and are resting. ¨ You have a low, dull backache that does not go away when you change your position. ¨ You have pain or pressure in your pelvis that comes and goes in a pattern. ¨ You have intestinal cramping or flu-like symptoms, with or without diarrhea. ¨ You notice an increase or change in your vaginal discharge. Discharge may be heavy, mucus-like, watery, or streaked with blood. ¨ Your water breaks. · If you think you have  labor: ¨ Drink 2 or 3 glasses of water or juice. Not drinking enough fluids can cause contractions. ¨ Stop what you are doing, and empty your bladder. Then lie down on your left side for at least 1 hour. ¨ While lying on your side, find your breast bone. Put your fingers in the soft spot just below it. Move your fingers down toward your belly button to find the top of your uterus. Check to see if it is tight. ¨ Contractions can be weak or strong. Record your contractions for an hour. Time a contraction from the start of one contraction to the start of the next one. ¨ Single or several strong contractions without a pattern are called Lumpkin-Mcgarry contractions. They are practice contractions but not the start of labor. They often stop if you change what you are doing. ¨ Call your doctor if you have regular contractions. Where can you learn more? Go to http://sheree-frank.info/. Enter Y944 in the search box to learn more about \"Weeks 26 to 30 of Your Pregnancy: Care Instructions. \" Current as of: May 30, 2016 Content Version: 11.1 © 4174-2278 [employer]. Care instructions adapted under license by [employer] (which disclaims liability or warranty for this information). If you have questions about a medical condition or this instruction, always ask your healthcare professional. [address] any warranty or liability for your use of this information. Nosebleeds: Care Instructions Your Care Instructions Nosebleeds are common, especially if you have colds or allergies. Many things can cause a nosebleed. Some nosebleeds stop on their own with pressure. Others need packing. Some get cauterized (sealed). If you have gauze or other packing materials in your nose, you will need to follow up with your doctor to have the packing removed. You may need more treatment if you get nosebleeds a lot. The doctor has checked you carefully, but problems can develop later. If you notice any problems or new symptoms, get medical treatment right away. Follow-up care is a key part of your treatment and safety. Be sure to make and go to all appointments, and call your doctor if you are having problems. It's also a good idea to know your test results and keep a list of the medicines you take. How can you care for yourself at home? · If you get another nosebleed: ¨ Sit up and tilt your head slightly forward. This keeps blood from going down your throat. ¨ Use your thumb and index finger to pinch your nose shut for 10 minutes. Use a clock. Do not check to see if the bleeding has stopped before the 10 minutes are up. If the bleeding has not stopped, pinch your nose shut for another 10 minutes. ¨ When the bleeding has stopped, try not to pick, rub, or blow your nose for 12 hours. Avoiding these things helps keep your nose from bleeding again. · If your doctor prescribed antibiotics, take them as directed. Do not stop taking them just because you feel better. You need to take the full course of antibiotics. To prevent nosebleeds · Do not blow your nose too hard. · Try not to lift or strain after a nosebleed. · Raise your head on a pillow while you sleep. · Put a thin layer of a saline- or water-based nasal gel, such as NasoGel, inside your nose. Put it on the septum, which divides your nostrils. This will prevent dryness that can cause nosebleeds. · Use a vaporizer or humidifier to add moisture to your bedroom. Follow the directions for cleaning the machine. · Do not use aspirin, ibuprofen (Advil, Motrin), or naproxen (Aleve) for 36 to 48 hours after a nosebleed unless your doctor tells you to. You can use acetaminophen (Tylenol) for pain relief. · Talk to your doctor about stopping any other medicines you are taking. Some medicines may make you more likely to get a nosebleed. · Do not use cold medicines or nasal sprays without first talking to your doctor. They can make your nose dry. When should you call for help? Call 911 anytime you think you may need emergency care. For example, call if: 
· You passed out (lost consciousness). Call your doctor now or seek immediate medical care if: 
· You get another nosebleed and your nose is still bleeding after you have applied pressure 3 times for 10 minutes each time (30 minutes total). · There is a lot of blood running down the back of your throat even after you pinch your nose and tilt your head forward. · You have a fever. · You have sinus pain. Watch closely for changes in your health, and be sure to contact your doctor if: 
· You get nosebleeds often, even if they stop. · You do not get better as expected. Where can you learn more? Go to http://sheree-frank.info/. Enter S156 in the search box to learn more about \"Nosebleeds: Care Instructions. \" Current as of: May 27, 2016 Content Version: 11.1 © 8997-5898 [employer]. Care instructions adapted under license by [employer] (which disclaims liability or warranty for this information). If you have questions about a medical condition or this instruction, always ask your healthcare professional. [place] [address] any warranty or liability for your use of this information. Discharge Orders None Introducing [facility]! Edilberto Lowery introduces Lingt patient portal. Now you can access parts of your medical record, email your doctor's office, and request medication refills online. 1. In your internet browser, go to [email or web address] 2. Click on the First Time User? Click Here link in the Sign In box. You will see the New Member Sign Up page. 3. Enter your Lingt Access Code exactly as it appears below. You will not need to use this code after youve completed the sign-up process. If you do not sign up before the expiration date, you must request a new code. · Lingt Access Code: [email or web address] Expires: 2/9/2017  2:41 PM 
 
4. Enter the last four digits of your Social Security Number (xxxx) and Date of Birth (mm/dd/yyyy) as indicated and click Submit. You will be taken to the next sign-up page. 5. Create a Lingt ID. This will be your Lingt login ID and cannot be changed, so think of one that is secure and easy to remember. 6. Create a Lingt password. You can change your password at any time. 7. Enter your Password Reset Question and Answer. This can be used at a later time if you forget your password. 8. Enter your e-mail address. You will receive e-mail notification when new information is available in [address] 9. Click Sign Up. You can now view and download portions of your medical record. 10. Click the Download Summary menu link to download a portable copy of your medical information. If you have questions, please visit the Frequently Asked Questions section of the Lingt website. Remember, Lingt is NOT to be used for urgent needs. For medical emergencies, dial 911. Now available from your iPhone and Android! General Information Please provide this summary of care documentation to your next provider. Patient Signature:  ____________________________________________________________ Date:  ____________________________________________________________  
  
Albert Moulding Provider Signature:  ____________________________________________________________ Date:  ____________________________________________________________

## 2017-01-05 NOTE — ED NOTES
Unable to find fetal heart tones. Pt reported that she had ultrasound at doctors office to find [address] Aquiles Barton notified.

## 2017-01-05 NOTE — ED PROVIDER NOTES
HPI Comments: [place] Ruby is a ~ 28 weeks gravid, 33 y.o. female with pertinent PMHx of DM, asthma, and HTN presenting ambulatory to the ED c/o intermittent episodes of epistaxis, with a large amount of bleeding and clotting from her right nare, x 3 days. Pt states that she has filled multiple emesis bags with blood. Pt states that she has been experiencing a productive cough (with yellow phlegm) x 2-3 days. Pt notes that her left flank is intermittently painful, but denies any current abdominal pain. Pt notes associated symptoms of bilateral leg swelling, chills, and congestion. Pt denies any sick contacts with similar symptoms, prior to onset of her symptoms. Pt states that she was seen yesterday and multiple other times for similar symptoms, but has not had any packing placed in either nares. Pt denies any history of similar symptoms. Pt denies being admitted for her asthma in the past. Pt denies any complications during her pregnancy and notes that she has been taking prenatal vitamins. Pt denies any fevers, vomiting, diarrhea, abdominal pain, vaginal bleeding/discharge, hematuria, dysuria, chest pain. PCP: Anne Cazares MD  Surgical Hx: tympanostomy  Social Hx: - smoking, - alcohol use, - illicit drug use      There are no other complaints, changes, or physical findings at this time. The history is provided by the patient. No  was used.         Past Medical History:   Diagnosis Date    Asthma     Diabetes ([facility] [address]     Hypertension        Past Surgical History:   Procedure Laterality Date    Hx tympanostomy           Family History:   Problem Relation Age of Onset    Diabetes Father     Heart Disease Father      CAD    Hypertension Father     Diabetes Mother     Hypertension Mother     Liver Disease Mother     Deep Vein Thrombosis Mother     Pulmonary Embolism Mother     Hypertension Sister     Deep Vein Thrombosis Sister     Pulmonary Embolism Sister     Diabetes Maternal Uncle     Cancer Maternal Grandmother      Lung Cancer       Social History     Social History    Marital status: SINGLE     Spouse name: N/A    Number of children: 0    Years of education: N/A     Occupational History    disabled      Social History Main Topics    Smoking status: Former Smoker     Packs/day: 0.10     Start date: 3/23/2004    Smokeless tobacco: Never Used    Alcohol use No    Drug use: No    Sexual activity: Yes     Partners: Male     Other Topics Concern    Not on file     Social History Narrative    Lives with fiance interested in pregnany         ALLERGIES: [id number]; Morphine; and Tramadol    Review of Systems   Constitutional: Positive for chills. Negative for fatigue and fever. HENT: Positive for congestion and nosebleeds. Negative for rhinorrhea and sore throat. Eyes: Negative for pain, discharge and visual disturbance. Respiratory: Positive for cough. Negative for chest tightness, shortness of breath and wheezing. Cardiovascular: Positive for leg swelling (bilateral, with intermittent pain). Negative for chest pain and palpitations. Gastrointestinal: Negative for abdominal pain, constipation, diarrhea, nausea and vomiting. Genitourinary: Negative for dysuria, frequency and hematuria. Musculoskeletal: Negative for arthralgias and back pain. Skin: Negative for rash. Neurological: Negative for dizziness, weakness, light-headedness and headaches. Psychiatric/Behavioral: Negative. Vitals:    01/05/17 1801 01/05/17 1903 01/05/17 1904 01/05/17 2000   BP: 114/88 117/67 117/67 (!) 122/92   Pulse: 88  91    Resp: 18  20    Temp:   98.2 °F (36.8 °C)    SpO2: 99%  99% 98%   Weight:       Height:                Physical Exam   Constitutional: She is oriented to person, place, and time. She appears well-developed and well-nourished. No distress. HENT:   Head: Normocephalic and atraumatic.    Right Ear: Tympanic membrane normal.   Left Ear: Tympanic membrane normal.   Mouth/Throat: Oropharynx is clear and moist.   Congestion  No active bleeding or blood seen in the nares   Eyes: EOM are normal. Right eye exhibits no discharge. Left eye exhibits no discharge. No scleral icterus. Neck: Normal range of motion. Neck supple. No tracheal deviation present. Cardiovascular: Normal rate, regular rhythm, normal heart sounds and intact distal pulses. Exam reveals no gallop and no friction rub. No murmur heard. Pulmonary/Chest: Effort normal. No respiratory distress. She has wheezes (scattered). Wet cough  Scattered crackles   Abdominal: Soft. There is no tenderness. Gravid uterus   Musculoskeletal: Normal range of motion. She exhibits edema (2+ bilateral lower extremity edema). No calf TTP   Lymphadenopathy:     She has no cervical adenopathy. Neurological: She is alert and oriented to person, place, and time. No focal neuro deficits   Skin: Skin is warm and dry. No rash noted. Psychiatric: She has a normal mood and affect. Nursing note and vitals reviewed. MDM  Number of Diagnoses or Management Options  28 weeks gestation of pregnancy:   Epistaxis:   Moderate persistent asthma with acute exacerbation:   Diagnosis management comments:     Patient presents to ED with epistaxis. No active bleeding noted on exam.  She also reports cough, congestion and has scattered wheezes and crackles on exam.  Differential includes asthma exacerbation, URI, bronchitis, pneumonia, influenza.   Lower extremity edema on likely due to pregnancy - blood pressure  - CBC, CMP, influenza  - Nebulizer treatment, reevaluate  - Will defer CXR due to pregnancy  - UA         Amount and/or Complexity of Data Reviewed  Clinical lab tests: ordered and reviewed  Review and summarize past medical records: yes  Discuss the patient with other providers: yes (Hospitalist, OB hospitalist, OB GYN)    Patient Progress  Patient progress: stable    ED Course Procedures  Progress Note:  5:15 PM  Pt re-evaluated. Pt is feeling better after the duoneb. Still no epistaxis on exam. She continues to have diffuse wheezing so will continue nebulizer treatments and treat with prednisone. Written by MARY Mckinnon Maibe, as dictated by Jocelynn Valentino MD.     Progress Note:  6:35 PM  Pt re-evaluated. Pt is bleeding from the right nare. Given multiple episodes of epistaxis, will place a packing. She continues to wheeze - will treat with magnesium since patient is not improving with nebulizer treatments. Written by MARY Mckinnon Maibe, as dictated by Jocelynn Valentino MD.      Procedure Note - Epistaxis Management:   6:40 PM  Performed by: Jocelynn Valentino MD .  Complexity: Simple  The patient underwent nasal pack placement with nasal packing in the right nare(s). Hemostasis was achieved after placement. Estimated blood loss: none  The procedure took 1-15 minutes, and pt tolerated well. Written by MARY Jackson, as dictated by Jocelynn Valentino MD.     CONSULT NOTE:   7:58 PM  Jocelynn Valentino MD spoke with Dr. Micheline Stock,   Specialty: Hospitalist  Discussed pt's hx, disposition, and available diagnostic and imaging results. Reviewed care plans. Consultant states that the [facility] hospitalist will have to admit the pt, as she is 28 weeks. Written by MARY Jackson, as dictated by Jocelynn Valentino MD.     Consult Note:  8:11 PM  Jocelynn Valentino MD spoke with Nursing Staff,  Specialty: [facility] hospitalist  Discussed pt's hx, disposition, and available diagnostic and imaging results. Reviewed care plans. Nurse states that she will relay the message to the attending. Written by MARY Mckinnon Maibe, as dictated by Jocelynn Valentino MD.     Procedure Note - Limited Bedside Ultrasound- Pregnancy, FHT  9:15 PM  Performed by: Jocelynn Valentino MD  Indication: Pregnancy  Interpretation:  Pt with Positive IUP noted on Transabdominal bedside US showing Positive FHT at 144, calculated by counting  Fetal movement was seen. The procedure took 1-15 minutes, and pt tolerated well. Written by Shannon Riojas, ED Scribe, as dictated by Micha Ramirez MD    Consult Note:  9:44 PM  Micha Ramirez MD spoke with Dr. Rebekah Chu,  Specialty: [facility] Hospitalist  Discussed pt's hx, disposition, and available diagnostic and imaging results. Reviewed care plans. Consultant agrees with plans as outlined and states that he will consider the pt's admission/hospitalist's role. Written by Reji Gutierrez, ED Scribe, as dictated by Micha Ramirez MD.     Consult Note:  10:26 PM  Micha Ramirez MD spoke with Dr. Otilia Cartagena,  Specialty: OB/GYN  Discussed pt's hx, disposition, and available diagnostic and imaging results. Reviewed care plans. Consultant agrees with plans as outlined. Dr. Otilia Cartagena will admit the pt to L&D. Written by Reji Gutierrez, ED Scribe, as dictated by Micha Ramirez MD.     LABORATORY TESTS:  Recent Results (from the past 12 hour(s))   CBC WITH AUTOMATED DIFF    Collection Time: 01/05/17  4:09 PM   Result Value Ref Range    WBC 10.9 3.6 - 11.0 K/uL    RBC 3.71 (L) 3.80 - 5.20 M/uL    HGB 9.2 (L) 11.5 - 16.0 g/dL    HCT 29.8 (L) 35.0 - 47.0 %    MCV 80.3 80.0 - 99.0 FL    MCH 24.8 (L) 26.0 - 34.0 PG    MCHC 30.9 30.0 - 36.5 g/dL    RDW 14.0 11.5 - 14.5 %    PLATELET 404 689 - 454 K/uL    NEUTROPHILS 67 32 - 75 %    LYMPHOCYTES 26 12 - 49 %    MONOCYTES 6 5 - 13 %    EOSINOPHILS 1 0 - 7 %    BASOPHILS 0 0 - 1 %    ABS. NEUTROPHILS 7.3 1.8 - 8.0 K/UL    ABS. LYMPHOCYTES 2.8 0.8 - 3.5 K/UL    ABS. MONOCYTES 0.6 0.0 - 1.0 K/UL    ABS. EOSINOPHILS 0.1 0.0 - 0.4 K/UL    ABS.  BASOPHILS 0.0 0.0 - 0.1 K/UL   METABOLIC PANEL, COMPREHENSIVE    Collection Time: 01/05/17  4:09 PM   Result Value Ref Range    Sodium 141 136 - 145 mmol/L    Potassium 3.9 3.5 - 5.1 mmol/L    Chloride 106 97 - 108 mmol/L    CO2 27 21 - 32 mmol/L Anion gap 8 5 - 15 mmol/L    Glucose 112 (H) 65 - 100 mg/dL    BUN 5 (L) 6 - 20 MG/DL    Creatinine 0.53 (L) 0.55 - 1.02 MG/DL    BUN/Creatinine ratio 9 (L) 12 - 20      GFR est AA >60 >60 ml/min/1.73m2    GFR est non-AA >60 >60 ml/min/1.73m2    Calcium 8.6 8.5 - 10.1 MG/DL    Bilirubin, total 0.4 0.2 - 1.0 MG/DL    ALT 69 12 - 78 U/L    AST 29 15 - 37 U/L    Alk.  phosphatase 76 45 - 117 U/L    Protein, total 6.6 6.4 - 8.2 g/dL    Albumin 2.4 (L) 3.5 - 5.0 g/dL    Globulin 4.2 (H) 2.0 - 4.0 g/dL    A-G Ratio 0.6 (L) 1.1 - 2.2     URINALYSIS W/ REFLEX CULTURE    Collection Time: 01/05/17  4:09 PM   Result Value Ref Range    Color YELLOW/STRAW      Appearance CLOUDY (A) CLEAR      Specific gravity 1.013 1.003 - 1.030      pH (UA) 7.5 5.0 - 8.0      Protein NEGATIVE  NEG mg/dL    Glucose NEGATIVE  NEG mg/dL    Ketone NEGATIVE  NEG mg/dL    Bilirubin NEGATIVE  NEG      Blood NEGATIVE  NEG      Urobilinogen 1.0 0.2 - 1.0 EU/dL    Nitrites NEGATIVE  NEG      Leukocyte Esterase NEGATIVE  NEG      WBC 0-4 0 - 4 /hpf    RBC 0-5 0 - 5 /hpf    Epithelial cells FEW FEW /lpf    Bacteria 1+ (A) NEG /hpf    UA:UC IF INDICATED URINE CULTURE ORDERED (A) CNI      Hyaline Cast 0-2 0 - 5 /lpf   INFLUENZA A & B AG (RAPID TEST)    Collection Time: 01/05/17  4:35 PM   Result Value Ref Range    Influenza A Antigen NEGATIVE  NEG      Influenza B Antigen NEGATIVE  NEG     PROTHROMBIN TIME + INR    Collection Time: 01/05/17  8:49 PM   Result Value Ref Range    INR 0.9 0.9 - 1.1      Prothrombin time 9.1 9.0 - 11.1 sec   GLUCOSE, POC    Collection Time: 01/05/17 10:33 PM   Result Value Ref Range    Glucose (POC) 233 (H) 65 - 100 mg/dL    Performed by Sukumar Wolf      MEDICATIONS GIVEN:  Medications   neomycin-bacitracnZn-polymyxnB (NEOSPORIN) ointment 1 Packet (not administered)   insulin lispro (HUMALOG) injection (3 Units SubCUTAneous Given 1/5/17 2240)   glucose chewable tablet 16 g (not administered)   dextrose (D50W) injection syrg 12.5-25 g (not administered)   glucagon (GLUCAGEN) injection 1 mg (not administered)   albuterol (PROVENTIL VENTOLIN) nebulizer solution 1.25 mg (1.25 mg Nebulization Given 1/5/17 2142)   sodium chloride (NS) flush 5-10 mL (not administered)   sodium chloride (NS) flush 5-10 mL (not administered)   acetaminophen (TYLENOL) tablet 650 mg (not administered)   zolpidem (AMBIEN) tablet 5 mg (not administered)   albuterol (PROVENTIL VENTOLIN) nebulizer solution 2.5 mg (2.5 mg Nebulization Given 1/5/17 1556)   acetaminophen (TYLENOL) tablet 650 mg (650 mg Oral Given 1/5/17 1555)   albuterol (PROVENTIL VENTOLIN) nebulizer solution 5 mg (5 mg Nebulization Given 1/5/17 1759)   predniSONE (DELTASONE) tablet 40 mg (40 mg Oral Given 1/5/17 1754)   oxymetazoline (AFRIN) 0.05 % nasal spray 2 Spray (2 Sprays Right Nostril Given 1/5/17 1827)   albuterol (PROVENTIL VENTOLIN) nebulizer solution 2.5 mg (2.5 mg Nebulization Given 1/5/17 1901)   magnesium sulfate 2 g/50 ml IVPB (premix or compounded) (0 g IntraVENous IV Completed 1/5/17 2012)   sodium chloride 0.9 % bolus infusion 1,000 mL (0 mL IntraVENous IV Completed 1/5/17 2021)       IMPRESSION:  1. Epistaxis    2. Moderate persistent asthma with acute exacerbation    3. 28 weeks gestation of pregnancy        PLAN:  1. Admit to L&D   Admit Note:  10:26 PM  Patient is being admitted to the hospital by Dr. Nancy Simmons. The results of their tests and reasons for their admission have been discussed with the patient and/or available family. They convey agreement and understanding for the need to be admitted and for their admission diagnosis. Written by Madge Cranker Blanchard Soho, ED Scribe, as dictated by Tracie Burton MD.     Attestation: This note is prepared by Angel Hampton, acting as Scribe for Tracie Burton MD.    Tracie Burton MD: The scribe's documentation has been prepared under my direction and personally reviewed by me in its entirety.  I confirm that the note above accurately reflects all work, treatment, procedures, and medical decision making performed by me.

## 2017-01-06 LAB
ALT SERPL-CCNC: 67 U/L (ref 12–78)
APPEARANCE UR: ABNORMAL
APPEARANCE UR: ABNORMAL
AST SERPL W P-5'-P-CCNC: 26 U/L (ref 15–37)
BACTERIA URNS QL MICRO: ABNORMAL /HPF
BACTERIA URNS QL MICRO: NEGATIVE /HPF
BASOPHILS # BLD AUTO: 0 K/UL (ref 0–0.1)
BASOPHILS # BLD: 0 % (ref 0–1)
BILIRUB SERPL-MCNC: 0.4 MG/DL (ref 0.2–1)
BILIRUB UR QL CFM: NEGATIVE
BILIRUB UR QL: NEGATIVE
CAOX CRY URNS QL MICRO: ABNORMAL
COLOR UR: ABNORMAL
COLOR UR: ABNORMAL
CREAT SERPL-MCNC: 0.61 MG/DL (ref 0.55–1.02)
CREAT UR-MCNC: 50.1 MG/DL
EOSINOPHIL # BLD: 0 K/UL (ref 0–0.4)
EOSINOPHIL NFR BLD: 0 % (ref 0–7)
EPITH CASTS URNS QL MICRO: ABNORMAL /LPF
EPITH CASTS URNS QL MICRO: ABNORMAL /LPF
ERYTHROCYTE [DISTWIDTH] IN BLOOD BY AUTOMATED COUNT: 14.1 % (ref 11.5–14.5)
GLUCOSE BLD STRIP.AUTO-MCNC: 109 MG/DL (ref 65–100)
GLUCOSE BLD STRIP.AUTO-MCNC: 129 MG/DL (ref 65–100)
GLUCOSE BLD STRIP.AUTO-MCNC: 132 MG/DL (ref 65–100)
GLUCOSE BLD STRIP.AUTO-MCNC: 141 MG/DL (ref 65–100)
GLUCOSE UR STRIP.AUTO-MCNC: 100 MG/DL
GLUCOSE UR STRIP.AUTO-MCNC: NEGATIVE MG/DL
HCT VFR BLD AUTO: 30 % (ref 35–47)
HGB BLD-MCNC: 9.3 G/DL (ref 11.5–16)
HGB UR QL STRIP: NEGATIVE
HGB UR QL STRIP: NEGATIVE
HYALINE CASTS URNS QL MICRO: ABNORMAL /LPF (ref 0–5)
KETONES UR QL STRIP.AUTO: ABNORMAL MG/DL
KETONES UR QL STRIP.AUTO: NEGATIVE MG/DL
LDH SERPL L TO P-CCNC: 143 U/L (ref 81–246)
LEUKOCYTE ESTERASE UR QL STRIP.AUTO: NEGATIVE
LEUKOCYTE ESTERASE UR QL STRIP.AUTO: NEGATIVE
LYMPHOCYTES # BLD AUTO: 13 % (ref 12–49)
LYMPHOCYTES # BLD: 1.5 K/UL (ref 0.8–3.5)
MCH RBC QN AUTO: 25 PG (ref 26–34)
MCHC RBC AUTO-ENTMCNC: 31 G/DL (ref 30–36.5)
MCV RBC AUTO: 80.6 FL (ref 80–99)
MONOCYTES # BLD: 0.4 K/UL (ref 0–1)
MONOCYTES NFR BLD AUTO: 3 % (ref 5–13)
MUCOUS THREADS URNS QL MICRO: ABNORMAL /LPF
NEUTS SEG # BLD: 9.8 K/UL (ref 1.8–8)
NEUTS SEG NFR BLD AUTO: 84 % (ref 32–75)
NITRITE UR QL STRIP.AUTO: NEGATIVE
NITRITE UR QL STRIP.AUTO: NEGATIVE
PH UR STRIP: 6.5 [PH] (ref 5–8)
PH UR STRIP: 7.5 [PH] (ref 5–8)
PLATELET # BLD AUTO: 265 K/UL (ref 150–400)
PROT UR STRIP-MCNC: NEGATIVE MG/DL
PROT UR STRIP-MCNC: NEGATIVE MG/DL
PROT UR-MCNC: 12 MG/DL (ref 0–11.9)
PROT/CREAT UR-RTO: 0.2
RBC # BLD AUTO: 3.72 M/UL (ref 3.8–5.2)
RBC #/AREA URNS HPF: ABNORMAL /HPF (ref 0–5)
RBC #/AREA URNS HPF: ABNORMAL /HPF (ref 0–5)
SERVICE CMNT-IMP: ABNORMAL
SP GR UR REFRACTOMETRY: 1.01 (ref 1–1.03)
SP GR UR REFRACTOMETRY: 1.02 (ref 1–1.03)
UA: UC IF INDICATED,UAUC: ABNORMAL
UA: UC IF INDICATED,UAUC: ABNORMAL
URATE SERPL-MCNC: 3 MG/DL (ref 2.6–6)
UROBILINOGEN UR QL STRIP.AUTO: 1 EU/DL (ref 0.2–1)
UROBILINOGEN UR QL STRIP.AUTO: 1 EU/DL (ref 0.2–1)
WBC # BLD AUTO: 11.6 K/UL (ref 3.6–11)
WBC URNS QL MICRO: ABNORMAL /HPF (ref 0–4)
WBC URNS QL MICRO: ABNORMAL /HPF (ref 0–4)

## 2017-01-06 PROCEDURE — 84450 TRANSFERASE (AST) (SGOT): CPT | Performed by: SPECIALIST

## 2017-01-06 PROCEDURE — 82247 BILIRUBIN TOTAL: CPT | Performed by: SPECIALIST

## 2017-01-06 PROCEDURE — 99218 HC RM OBSERVATION: CPT

## 2017-01-06 PROCEDURE — 74011000250 HC RX REV CODE- 250: Performed by: INTERNAL MEDICINE

## 2017-01-06 PROCEDURE — 77030018744 HC FLOMTR PEAK FLO -A

## 2017-01-06 PROCEDURE — 82565 ASSAY OF CREATININE: CPT | Performed by: SPECIALIST

## 2017-01-06 PROCEDURE — 87086 URINE CULTURE/COLONY COUNT: CPT | Performed by: SPECIALIST

## 2017-01-06 PROCEDURE — 84156 ASSAY OF PROTEIN URINE: CPT | Performed by: SPECIALIST

## 2017-01-06 PROCEDURE — 74011250637 HC RX REV CODE- 250/637: Performed by: INTERNAL MEDICINE

## 2017-01-06 PROCEDURE — 85025 COMPLETE CBC W/AUTO DIFF WBC: CPT | Performed by: SPECIALIST

## 2017-01-06 PROCEDURE — 84460 ALANINE AMINO (ALT) (SGPT): CPT | Performed by: SPECIALIST

## 2017-01-06 PROCEDURE — 84550 ASSAY OF BLOOD/URIC ACID: CPT | Performed by: SPECIALIST

## 2017-01-06 PROCEDURE — 74011250637 HC RX REV CODE- 250/637: Performed by: OBSTETRICS & GYNECOLOGY

## 2017-01-06 PROCEDURE — 81001 URINALYSIS AUTO W/SCOPE: CPT | Performed by: SPECIALIST

## 2017-01-06 PROCEDURE — 82962 GLUCOSE BLOOD TEST: CPT

## 2017-01-06 PROCEDURE — 94761 N-INVAS EAR/PLS OXIMETRY MLT: CPT

## 2017-01-06 PROCEDURE — 36415 COLL VENOUS BLD VENIPUNCTURE: CPT | Performed by: SPECIALIST

## 2017-01-06 PROCEDURE — 65410000002 HC RM PRIVATE OB

## 2017-01-06 PROCEDURE — 94640 AIRWAY INHALATION TREATMENT: CPT

## 2017-01-06 PROCEDURE — 83615 LACTATE (LD) (LDH) ENZYME: CPT | Performed by: SPECIALIST

## 2017-01-06 PROCEDURE — 81001 URINALYSIS AUTO W/SCOPE: CPT | Performed by: OBSTETRICS & GYNECOLOGY

## 2017-01-06 RX ORDER — AZITHROMYCIN 250 MG/1
500 TABLET, FILM COATED ORAL
Status: COMPLETED | OUTPATIENT
Start: 2017-01-06 | End: 2017-01-06

## 2017-01-06 RX ORDER — PREDNISONE 20 MG/1
40 TABLET ORAL
Status: DISCONTINUED | OUTPATIENT
Start: 2017-01-07 | End: 2017-01-07

## 2017-01-06 RX ORDER — INSULIN LISPRO 100 [IU]/ML
INJECTION, SOLUTION INTRAVENOUS; SUBCUTANEOUS
Status: DISCONTINUED | OUTPATIENT
Start: 2017-01-07 | End: 2017-01-09 | Stop reason: HOSPADM

## 2017-01-06 RX ORDER — LABETALOL 100 MG/1
100 TABLET, FILM COATED ORAL 2 TIMES DAILY
Status: DISCONTINUED | OUTPATIENT
Start: 2017-01-06 | End: 2017-01-09 | Stop reason: HOSPADM

## 2017-01-06 RX ORDER — AZITHROMYCIN 250 MG/1
250 TABLET, FILM COATED ORAL DAILY
Status: DISCONTINUED | OUTPATIENT
Start: 2017-01-07 | End: 2017-01-09 | Stop reason: HOSPADM

## 2017-01-06 RX ORDER — INSULIN LISPRO 100 [IU]/ML
INJECTION, SOLUTION INTRAVENOUS; SUBCUTANEOUS
Status: DISCONTINUED | OUTPATIENT
Start: 2017-01-06 | End: 2017-01-06

## 2017-01-06 RX ORDER — GUAIFENESIN 100 MG/5ML
81 LIQUID (ML) ORAL DAILY
Status: DISCONTINUED | OUTPATIENT
Start: 2017-01-06 | End: 2017-01-09 | Stop reason: HOSPADM

## 2017-01-06 RX ORDER — METFORMIN HYDROCHLORIDE 500 MG/1
1000 TABLET ORAL 2 TIMES DAILY WITH MEALS
Status: DISCONTINUED | OUTPATIENT
Start: 2017-01-06 | End: 2017-01-09 | Stop reason: HOSPADM

## 2017-01-06 RX ADMIN — METFORMIN HYDROCHLORIDE 1000 MG: 500 TABLET ORAL at 08:52

## 2017-01-06 RX ADMIN — ALBUTEROL SULFATE 1.25 MG: 2.5 SOLUTION RESPIRATORY (INHALATION) at 08:25

## 2017-01-06 RX ADMIN — METFORMIN HYDROCHLORIDE 1000 MG: 500 TABLET ORAL at 18:44

## 2017-01-06 RX ADMIN — LABETALOL HCL 100 MG: 100 TABLET, FILM COATED ORAL at 20:17

## 2017-01-06 RX ADMIN — LABETALOL HCL 100 MG: 100 TABLET, FILM COATED ORAL at 08:00

## 2017-01-06 RX ADMIN — ALBUTEROL SULFATE 1.25 MG: 2.5 SOLUTION RESPIRATORY (INHALATION) at 13:50

## 2017-01-06 RX ADMIN — LINAGLIPTIN 5 MG: 5 TABLET, FILM COATED ORAL at 10:19

## 2017-01-06 RX ADMIN — ALBUTEROL SULFATE 1.25 MG: 2.5 SOLUTION RESPIRATORY (INHALATION) at 21:00

## 2017-01-06 RX ADMIN — AZITHROMYCIN 500 MG: 250 TABLET, FILM COATED ORAL at 09:37

## 2017-01-06 RX ADMIN — Medication 10 ML: at 08:00

## 2017-01-06 RX ADMIN — ALBUTEROL SULFATE 1.25 MG: 2.5 SOLUTION RESPIRATORY (INHALATION) at 02:24

## 2017-01-06 RX ADMIN — .BETA.-CAROTENE, ASCORBIC ACID, CHOLECALCIFEROL, .ALPHA.-TOCOPHEROL ACETATE, DL-, THIAMINE MONONITRATE, RIBOFLAVIN, NIACINAMIDE, PYRIDOXINE HYDROCHLORIDE, FOLIC ACID, CYANOCOBALAMIN, CALCIUM CARBONATE, FERROUS FUMARATE, ZINC OXIDE AND CUPRIC OXIDE 1 TABLET: 4000; 120; 400; 22; 1.84; 3; 20; 10; 1; 12; 200; 27; 25; 2 TABLET ORAL at 08:51

## 2017-01-06 RX ADMIN — Medication 10 ML: at 22:00

## 2017-01-06 RX ADMIN — ASPIRIN 81 MG CHEWABLE TABLET 81 MG: 81 TABLET CHEWABLE at 09:37

## 2017-01-06 RX ADMIN — Medication 10 ML: at 18:43

## 2017-01-06 NOTE — ROUTINE PROCESS
TRANSFER - OUT REPORT:    Verbal report given to Rui Shah (name) on Rebekah Zavala  being transferred to L&D (unit) for routine progression of care       Report consisted of patients Situation, Background, Assessment and   Recommendations(SBAR). Information from the following report(s) SBAR, Kardex, ED Summary, Intake/Output, MAR and Recent Results was reviewed with the receiving nurse. Lines:   Peripheral IV 01/05/17 Left Forearm (Active)   Site Assessment Clean, dry, & intact 1/5/2017  4:31 PM   Phlebitis Assessment 0 1/5/2017  4:31 PM   Infiltration Assessment 0 1/5/2017  4:31 PM   Dressing Status Clean, dry, & intact 1/5/2017  4:31 PM   Dressing Type Transparent 1/5/2017  4:31 PM   Hub Color/Line Status Pink;Flushed 1/5/2017  4:31 PM        Opportunity for questions and clarification was provided.       Patient transported with:   [employer]

## 2017-01-06 NOTE — PROGRESS NOTES
Bedside and Verbal shift change report received from lynda . Report included the following information ED Summary. Received patient via w/c from ed, consents signed placed on monitor, urine and blood sent to lab, oriented to room, nostril tamponade in place with scant breakthrough drainage. Patient denies pain, VB, LOF      06- called lab questioning why protein/crit ratio is not \"pending\" under chart review, states she did not send it off yet.  States will change it to [employer]

## 2017-01-06 NOTE — PROGRESS NOTES
Patient eval in Er  Feeling much better  VS: Stable  S/P Neb trt  Plan: NST  PIH Labs  Admit to L&D Observ  Poss d/c home in am

## 2017-01-06 NOTE — PROGRESS NOTES
0730-Report from [address] [facility], care assumed at this time    0830-Respiratory in to see patient, nebulizer treatment given    0835-Hospitalist Radha  NP in to see patient, states she will order chest x-ray and steroids. Her number is [phone],     0916-Spoke with Amber at ENT office, states she will pass consult to the nurse. 0920-Orders received on the phone from Dr. Chester Davis for [place] 100mg daily and 81mg chewable aspirin daily    0924-Pharmacy verifies that linagliptin 5mg is substitute for januvia 100mg. Order placed    1000-Call from ENT that their MD does not see this hospital. Given number to call for [facility] for consult    1008-Spoke with [facility], phone call placed for consult. 1025-Spoke with Dr. Jw David, orders received to change insulin sliding scale to after meals as needed depending on post-prandial blood sugar    1715-Dr. Sarah Zendejas from [facility] in to see patient, POC discussed, states he would prefer patient to stay until Monday so that the packing is in for three days.  Patient agrees with plan,

## 2017-01-06 NOTE — PROGRESS NOTES
Spoke with the on call ENT dr from [facility], states we can use afrin spray on a mustache dressing (packing taped under nose) if needed while waiting for day dr to come. Patient asleep right now and nose not bleeding at moment, will pass to day nurse.  To call back after 0830 to speak with day doctor who makes rounds

## 2017-01-06 NOTE — H&P
History & Physical    Name: Duane Hammans MRN: [id number]  SSN: [id number]    YOB: 1985  Age: 33 y.o. Sex: female      Subjective:     Reason for Admission:  Pregnancy and Chronic Hypertension and Epistaxis; DM    History of Present Illness: Ms. Deya Jacob is a 33 y.o.  female with an estimated gestational age of 30w6d with Estimated Date of Delivery: 3/26/17. Patient complains of nose bleeds for 4 days and URI symptoms of productive cough with yellow-green sputum for one week; asthma exacerbation for 1 days. Pregnancy has been complicated by chronic hypertension, diabetes - Type 2 and asthma. Patient denies abdominal pain  , chest pain, contractions, fever, headache , pelvic pressure, swelling, vaginal bleeding , vaginal leaking of fluid  and visual disturbances.     OB History    Para Term  AB SAB TAB Ectopic Multiple Living   1               # Outcome Date GA Lbr Byron/2nd Weight Sex Delivery Anes PTL Lv   1 Current                 Past Medical History   Diagnosis Date    Asthma     Diabetes ([facility] [address]     Hypertension      Past Surgical History   Procedure Laterality Date    Hx tympanostomy       Social History     Occupational History    disabled      Social History Main Topics    Smoking status: Former Smoker     Packs/day: 0.10     Start date: 3/23/2004    Smokeless tobacco: Never Used    Alcohol use No    Drug use: No    Sexual activity: Yes     Partners: Male      Family History   Problem Relation Age of Onset    Diabetes Father     Heart Disease Father      CAD    Hypertension Father     Diabetes Mother     Hypertension Mother     Liver Disease Mother     Deep Vein Thrombosis Mother     Pulmonary Embolism Mother     Hypertension Sister     Deep Vein Thrombosis Sister     Pulmonary Embolism Sister     Diabetes Maternal Uncle     Cancer Maternal Grandmother      Lung Cancer       Allergies   Allergen Reactions    Mirapex [Pramipexole] Drowsiness    Morphine Rash    Tramadol Other (comments)     headache     Prior to Admission medications    Medication Sig Start Date End Date Taking? Authorizing Provider   guaiFENesin-codeine (ROBITUSSIN AC) 100-10 mg/5 mL solution Take 5 mL by mouth four (4) times daily as needed for Cough for up to 7 days. Max Daily Amount: 20 mL. 1/4/17 1/11/17  WAYNE Bills   azithromycin (ZITHROMAX Z-ALEX) 250 mg tablet Take 2 tabs today; then take 1 tab daily for 4 days 1/4/17   WAYNE Bills   diphenoxylate-atropine (LOMOTIL) 2.5-0.025 mg per tablet Take 1 Tab by mouth four (4) times daily as needed for Diarrhea. Max Daily Amount: 4 Tabs. 12/24/16   Jayshree Patiño DO   SITagliptin (JANUVIA) 100 mg tablet Take 1 Tab by mouth daily. Appointment needed to refill this medication again. 12/17/16   Erich Mcfadden MD   glyBURIDE (DIABETA) 1.25 mg tablet Take 1/2 pill if you have an evening snack 12/7/16   Kurt Leon MD   labetalol (NORMODYNE) 100 mg tablet Take 100 mg by mouth two (2) times a day. 8/1/16   Historical Provider   PRENATAL VIT#96/FERROUS FUM/FA (PRENATAL VITAMIN) 27 mg iron- 800 mcg tab tablet daily. 8/1/16   Historical Provider   aspirin 81 mg chewable tablet Take 1 Tab by mouth daily. 8/8/16   Reva Hall NP   Lancets misc Use up to three times per days; 7/6/16   Erich Mcfadden MD   glucose blood VI test strips (ASCENSIA AUTODISC VI, ONE TOUCH ULTRA TEST VI) strip Use up to four times daily 7/6/16   Erich Mcfadden MD   Blood-Glucose Meter monitoring kit Use up to three times per days 6/25/16   Erich Mcfadden MD   [facility]erol [facility]) 90 mcg/actuation inhaler Take 2 Puffs by inhalation every four (4) hours as needed for Wheezing. 3/23/16   Erich Mcfadden MD   metFORMIN (GLUCOPHAGE) 1,000 mg tablet Take 1 Tab by mouth two (2) times a day.  3/23/16   Erich Mcfadden MD   PEN NEEDLE 31 X 5/16 \" ndle  5/12/15   Phys MD Tali        Review of Systems:  A comprehensive review of systems was negative except for that written in the History of Present Illness. Objective:     Vitals:    Vitals:    17 2300 17 0028 17 0152 17 0153   BP: 121/67 125/70  122/60   Pulse:    95   Resp:    18   Temp:    97.9 °F (36.6 °C)   SpO2: 98% 99% 97%    Weight:       Height:          Temp (24hrs), Av.9 °F (36.6 °C), Min:97.5 °F (36.4 °C), Max:98.2 °F (36.8 °C)    BP  Min: 114/88  Max: 136/76     Physical Exam:  Patient without distress. Abdomen: soft, nontender  Fundus: soft and non tender  Lower Extremities:  - Edema No     Membranes:  Intact  Uterine Activity: To be monitored this morning  Fetal Heart Rate: To be monitored this morning       Lab/Data Review:  Recent Results (from the past 24 hour(s))   CBC WITH AUTOMATED DIFF    Collection Time: 17  4:09 PM   Result Value Ref Range    WBC 10.9 3.6 - 11.0 K/uL    RBC 3.71 (L) 3.80 - 5.20 M/uL    HGB 9.2 (L) 11.5 - 16.0 g/dL    HCT 29.8 (L) 35.0 - 47.0 %    MCV 80.3 80.0 - 99.0 FL    MCH 24.8 (L) 26.0 - 34.0 PG    MCHC 30.9 30.0 - 36.5 g/dL    RDW 14.0 11.5 - 14.5 %    PLATELET 837 277 - 913 K/uL    NEUTROPHILS 67 32 - 75 %    LYMPHOCYTES 26 12 - 49 %    MONOCYTES 6 5 - 13 %    EOSINOPHILS 1 0 - 7 %    BASOPHILS 0 0 - 1 %    ABS. NEUTROPHILS 7.3 1.8 - 8.0 K/UL    ABS. LYMPHOCYTES 2.8 0.8 - 3.5 K/UL    ABS. MONOCYTES 0.6 0.0 - 1.0 K/UL    ABS. EOSINOPHILS 0.1 0.0 - 0.4 K/UL    ABS.  BASOPHILS 0.0 0.0 - 0.1 K/UL   METABOLIC PANEL, COMPREHENSIVE    Collection Time: 17  4:09 PM   Result Value Ref Range    Sodium 141 136 - 145 mmol/L    Potassium 3.9 3.5 - 5.1 mmol/L    Chloride 106 97 - 108 mmol/L    CO2 27 21 - 32 mmol/L    Anion gap 8 5 - 15 mmol/L    Glucose 112 (H) 65 - 100 mg/dL    BUN 5 (L) 6 - 20 MG/DL    Creatinine 0.53 (L) 0.55 - 1.02 MG/DL    BUN/Creatinine ratio 9 (L) 12 - 20      GFR est AA >60 >60 ml/min/1.73m2    GFR est non-AA >60 >60 ml/min/1.73m2    Calcium 8.6 8.5 - 10.1 MG/DL    Bilirubin, total 0.4 0.2 - 1.0 MG/DL    ALT 69 12 - 78 U/L    AST 29 15 - 37 U/L    Alk. phosphatase 76 45 - 117 U/L    Protein, total 6.6 6.4 - 8.2 g/dL    Albumin 2.4 (L) 3.5 - 5.0 g/dL    Globulin 4.2 (H) 2.0 - 4.0 g/dL    A-G Ratio 0.6 (L) 1.1 - 2.2     URINALYSIS W/ REFLEX CULTURE    Collection Time: 01/05/17  4:09 PM   Result Value Ref Range    Color YELLOW/STRAW      Appearance CLOUDY (A) CLEAR      Specific gravity 1.013 1.003 - 1.030      pH (UA) 7.5 5.0 - 8.0      Protein NEGATIVE  NEG mg/dL    Glucose NEGATIVE  NEG mg/dL    Ketone NEGATIVE  NEG mg/dL    Bilirubin NEGATIVE  NEG      Blood NEGATIVE  NEG      Urobilinogen 1.0 0.2 - 1.0 EU/dL    Nitrites NEGATIVE  NEG      Leukocyte Esterase NEGATIVE  NEG      WBC 0-4 0 - 4 /hpf    RBC 0-5 0 - 5 /hpf    Epithelial cells FEW FEW /lpf    Bacteria 1+ (A) NEG /hpf    UA:UC IF INDICATED URINE CULTURE ORDERED (A) CNI      Hyaline Cast 0-2 0 - 5 /lpf   INFLUENZA A & B AG (RAPID TEST)    Collection Time: 01/05/17  4:35 PM   Result Value Ref Range    Influenza A Antigen NEGATIVE  NEG      Influenza B Antigen NEGATIVE  NEG     PROTHROMBIN TIME + INR    Collection Time: 01/05/17  8:49 PM   Result Value Ref Range    INR 0.9 0.9 - 1.1      Prothrombin time 9.1 9.0 - 11.1 sec   GLUCOSE, POC    Collection Time: 01/05/17 10:33 PM   Result Value Ref Range    Glucose (POC) 233 (H) 65 - 100 mg/dL    Performed by Elizabeth Garza    CBC WITH AUTOMATED DIFF    Collection Time: 01/06/17  1:53 AM   Result Value Ref Range    WBC 11.6 (H) 3.6 - 11.0 K/uL    RBC 3.72 (L) 3.80 - 5.20 M/uL    HGB 9.3 (L) 11.5 - 16.0 g/dL    HCT 30.0 (L) 35.0 - 47.0 %    MCV 80.6 80.0 - 99.0 FL    MCH 25.0 (L) 26.0 - 34.0 PG    MCHC 31.0 30.0 - 36.5 g/dL    RDW 14.1 11.5 - 14.5 %    PLATELET 274 635 - 106 K/uL    NEUTROPHILS 84 (H) 32 - 75 %    LYMPHOCYTES 13 12 - 49 %    MONOCYTES 3 (L) 5 - 13 %    EOSINOPHILS 0 0 - 7 %    BASOPHILS 0 0 - 1 %    ABS. NEUTROPHILS 9.8 (H) 1.8 - 8.0 K/UL    ABS. LYMPHOCYTES 1.5 0.8 - 3.5 K/UL    ABS. MONOCYTES 0.4 0.0 - 1.0 K/UL    ABS. EOSINOPHILS 0.0 0.0 - 0.4 K/UL    ABS. BASOPHILS 0.0 0.0 - 0.1 K/UL   CREATININE    Collection Time: 01/06/17  1:53 AM   Result Value Ref Range    Creatinine 0.61 0.55 - 1.02 MG/DL    GFR est AA >60 >60 ml/min/1.73m2    GFR est non-AA >60 >60 ml/min/1.73m2   AST    Collection Time: 01/06/17  1:53 AM   Result Value Ref Range    AST 26 15 - 37 U/L   ALT    Collection Time: 01/06/17  1:53 AM   Result Value Ref Range    ALT 67 12 - 78 U/L   URIC ACID    Collection Time: 01/06/17  1:53 AM   Result Value Ref Range    Uric acid 3.0 2.6 - 6.0 MG/DL   LD    Collection Time: 01/06/17  1:53 AM   Result Value Ref Range     81 - 246 U/L   URINALYSIS W/ REFLEX CULTURE    Collection Time: 01/06/17  1:53 AM   Result Value Ref Range    Color YELLOW/STRAW      Appearance CLOUDY (A) CLEAR      Specific gravity 1.012 1.003 - 1.030      pH (UA) 7.5 5.0 - 8.0      Protein NEGATIVE  NEG mg/dL    Glucose 100 (A) NEG mg/dL    Ketone NEGATIVE  NEG mg/dL    Bilirubin NEGATIVE  NEG      Blood NEGATIVE  NEG      Urobilinogen 1.0 0.2 - 1.0 EU/dL    Nitrites NEGATIVE  NEG      Leukocyte Esterase NEGATIVE  NEG      WBC 0-4 0 - 4 /hpf    RBC 0-5 0 - 5 /hpf    Epithelial cells FEW FEW /lpf    Bacteria 1+ (A) NEG /hpf    UA:UC IF INDICATED URINE CULTURE ORDERED (A) CNI      Hyaline Cast 0-2 0 - 5 /lpf   BILIRUBIN, TOTAL    Collection Time: 01/06/17  1:53 AM   Result Value Ref Range    Bilirubin, total 0.4 0.2 - 1.0 MG/DL       Assessment and Plan: Active Problems:    * No active hospital problems. *     - Diabetes-Type 2:  Treat with Oral hypoglycemics and insulin as needed   And Asthma, CHTN, epistaxis  1. Epistaxis - ENT to see patient today; currently nasal tamponade  2. Asthma - make be exacerbated from URI symptoms; will start Z-pack; albuterol inhaler; respiratory seeing patient; normal sats; no respiratory distress this morning. 3.  DM - 200s overnight; will start home po metformin with sliding scale coverage  4.   Consider discharge when respiratory symptoms are improved; DM  Is controlled; and expitaxis is improved      Signed By:  Fuad Rosales MD     January 6, 2017

## 2017-01-06 NOTE — ED NOTES
Bedside shift change report given to Marilee Fabian RN (oncoming nurse) by [address], RN (offgoing nurse). Report included the following information ED Summary.

## 2017-01-06 NOTE — PROGRESS NOTES
Hospitalist Progress Note    NAME: Paramjit Henriquez   :  1985   MRN:  [id number]       Interim Hospital Summary: 33 y.o. female whom presented on 2017 with asthma exacerbation. Pt is in 28 weeks of gestation. Main provider: MADDIE     Assessment / Plan:  Epistaxis  -pt says she came here due to this complaint; she is now s/p tamponade in the R nostril  -INR 0.9,    -blood pressure xiang appears to be WNL - monitor  -ENT consult pending; currently has nasal tampon in left nostril    Asthma exacerbation  -she does not complain of SOB but does report cough for 2-3 days  -continue z-pack as ordered at ER,  Started on prednisone 40mg po every day x 3  - continue with nebulizer treatments  - check Peak flow meter before and after each neb  - will consider CXR if symptoms not improved    HTN  -cont labetolol    DM  -recommend cont home meds (not ordered by me) and add a sliding scale (ordered by me); rec to consider changing NPH 0.2units/kg from oral hypoglycemic agent if blood glucose not under control   -monitor FSG; recommend diabetic diet    28 weeks pregnant   -gyn to admit     Code Status: full  DVT Prophylaxis: Per gyn    Recommended Disposition: Home w/Family     Subjective:     Chief Complaint / Reason for Physician Visit  \"I feel winded it easily\". Discussed with RN events overnight. Review of Systems:  Symptom Y/N Comments  Symptom Y/N Comments   Fever/Chills n   Chest Pain n    Poor Appetite n   Edema     Cough y   Abdominal Pain     Sputum n   Joint Pain     SOB/CASTREJON y   Pruritis/Rash     Nausea/vomit n   Tolerating PT/OT     Diarrhea n   Tolerating Diet     Constipation    Other       Could NOT obtain due to:      Objective:     VITALS:   Last 24hrs VS reviewed since prior progress note.  Most recent are:  Patient Vitals for the past 24 hrs:   Temp Pulse Resp BP SpO2   17 1108 98 °F (36.7 °C) 82 18 116/51 97 %   17 1107 - - - - 97 %   17 0825 - - - - 98 %   17 1962 97.9 °F (36.6 °C) 86 18 132/76 98 %   01/06/17 0800 - - - 132/76 -   01/06/17 0153 97.9 °F (36.6 °C) 95 18 122/60 -   01/06/17 0152 - - - - 97 %   01/06/17 0028 - - - 125/70 99 %   01/05/17 2300 - - - 121/67 98 %   01/05/17 2000 - - - (!) 122/92 98 %   01/05/17 1904 98.2 °F (36.8 °C) 91 20 117/67 99 %   01/05/17 1903 - - - 117/67 -   01/05/17 1801 - 88 18 114/88 99 %   01/05/17 1415 97.5 °F (36.4 °C) 95 18 136/76 100 %       Intake/Output Summary (Last 24 hours) at 01/06/17 1334  Last data filed at 01/06/17 0130   Gross per 24 hour   Intake                0 ml   Output              500 ml   Net             -500 ml        PHYSICAL EXAM:  General: WD, WN. Alert, cooperative, no acute distress    EENT:  EOMI. Anicteric sclerae. MMM  Resp:  Exp wheezes, tight air movement,  No accessory muscle use  CV:  Regular  rhythm,  No edema  GI:  Soft, Non distended, Non tender.  +Bowel sounds  Neurologic:  Alert and oriented X 3, normal speech,   Psych:   Good insight. Not anxious nor agitated  Skin:  No rashes. No jaundice    Reviewed most current lab test results and cultures  YES  Reviewed most current radiology test results   YES  Review and summation of old records today    NO  Reviewed patient's current orders and MAR    YES  PMH/SH reviewed - no change compared to H&P  ________________________________________________________________________  Care Plan discussed with:    Comments   Patient y    Family  y    RN y    Care Manager     Consultant                        Multidiciplinary team rounds were held today with , nursing, pharmacist and clinical coordinator. Patient's plan of care was discussed; medications were reviewed and discharge planning was addressed.      ________________________________________________________________________  Total NON critical care TIME:  30   Minutes    Total CRITICAL CARE TIME Spent:   Minutes non procedure based      Comments   >50% of visit spent in counseling and coordination of care     ________________________________________________________________________  Julianne Knapp NP     Procedures: see electronic medical records for all procedures/Xrays and details which were not copied into this note but were reviewed prior to creation of Plan. LABS:  I reviewed today's most current labs and imaging studies.   Pertinent labs include:  Recent Labs      01/06/17 0153 01/05/17   1609   WBC  11.6*  10.9   HGB  9.3*  9.2*   HCT  30.0*  29.8*   PLT  265  272     Recent Labs      01/06/17 0153 01/05/17   2049  01/05/17   1609   NA   --    --   141   K   --    --   3.9   CL   --    --   106   CO2   --    --   27   GLU   --    --   112*   BUN   --    --   5*   CREA  0.61   --   0.53*   CA   --    --   8.6   ALB   --    --   2.4*   TBILI  0.4   --   0.4   SGOT  26   --   29   ALT  67   --   69   INR   --   0.9   --        Signed: )Ivis Noriega NP

## 2017-01-06 NOTE — CONSULTS
Hospitalist Consultation Note    NAME:  Nina Condon   :   1985   MRN:   [id number]     ATTENDING: No admitting provider for patient encounter. PCP:  Landy Davidson MD    Date/Time:  2017 8:03 PM      Recommendations/Plan:       Epistaxis  -pt says she came here due to this complaint; she is now s/p tamponade in the R nostril  -check INR as this has been going on all week; platelet are ok  -blood pressure xiang appears to be WNL - monitor  -would have ENT see her tomorrow since it has been an ongoing issue  Asthma exacerbation  -she does not complain of SOB but does report cough for 2-3 days; consider cxr if not improving off abx  -do not think she needs another abx since she had one this week but she may benefit from prednisone with nebulizer treatments; since this is not a life threatening situation will not rx prednisone, will rx albuterol nebs  -follow peak flow if she is admitted  HTN  -cont labetolol  DM  -recommend cont home meds (not ordered by me) and add a sliding scale (ordered by me)  -monitor FSG; recommend diabetic diet  28 weeks pregnant   -gyn to admit    Code Status: full  DVT Prophylaxis:  Per gyn          Subjective:   REQUESTING PHYSICIAN: ER  REASON FOR CONSULT:      Helen Walker is a 33 y.o.  female who I was asked to see for asthma exacerbation. The patient is 28 weeks pregnant and is a DM as well as Hypertensive as well. The patient was seen in the ER yesterday for the same symptoms and was rx'd azithromycin and robitussin. However she could not fill the antibiotic because she already took a z pack recently (in this week she says). She apparently has been to urgent care and then [facility] twice for intermittent bleeding from her R nare. Pt states that she has filled multiple emesis bags with blood and that she has been experiencing a productive cough (with yellow phlegm) x 2-3 days. Chills at home but she says no fever.  +SOB with coughing but none otherwise. She does not check a peak flow at home. No dizziness. She has been on nebulizer treatments for about a week now. \"Not too long ago they gave me prednisone, here. \"     Past Medical History   Diagnosis Date    Asthma     Diabetes ([address]     Hypertension       Past Surgical History   Procedure Laterality Date    Hx tympanostomy       Social History   Substance Use Topics    Smoking status: Former Smoker     Packs/day: 0.10     Start date: 3/23/2004 - QUIT one week AGO    Smokeless tobacco: Never Used    Alcohol use No      Family History   Problem Relation Age of Onset    Diabetes Father     Heart Disease Father      CAD    Hypertension Father     Diabetes Mother     Hypertension Mother     Liver Disease Mother     Deep Vein Thrombosis Mother     Pulmonary Embolism Mother     Hypertension Sister     Deep Vein Thrombosis Sister     Pulmonary Embolism Sister     Diabetes Maternal Uncle     Cancer Maternal Grandmother      Lung Cancer      Allergies   Allergen Reactions    Mirapex [Pramipexole] Drowsiness    Morphine Rash    Tramadol Other (comments)     headache      Prior to Admission medications    Medication Sig Start Date End Date Taking? Authorizing Provider   diphenoxylate-atropine (LOMOTIL) 2.5-0.025 mg per tablet Take 1 Tab by mouth four (4) times daily as needed for Diarrhea. Max Daily Amount: 4 Tabs. 12/24/16   Reyna Laughlin DO   SITagliptin (JANUVIA) 100 mg tablet Take 1 Tab by mouth daily. Appointment needed to refill this medication again. 12/17/16   Julien Luciano MD   glyBURIDE (DIABETA) 1.25 mg tablet Take 1/2 pill if you have an evening snack 12/7/16   Emy Cobb MD   labetalol (NORMODYNE) 100 mg tablet Take 100 mg by mouth two (2) times a day. 8/1/16   Historical Provider   PRENATAL VIT#96/FERROUS FUM/FA (PRENATAL VITAMIN) 27 mg iron- 800 mcg tab tablet daily. 8/1/16   Historical Provider   aspirin 81 mg chewable tablet Take 1 Tab by mouth daily.  8/8/16   Naima Milton Ab Thomas, BRITNI                           albuterol [facility] HFA) 90 mcg/actuation inhaler Take 2 Puffs by inhalation every four (4) hours as needed for Wheezing. 3/23/16   Valarie Azar MD   metFORMIN (GLUCOPHAGE) 1,000 mg tablet Take 1 Tab by mouth two (2) times a day. 3/23/16   Valarie Azar MD               REVIEW OF SYSTEMS:     Total of 12 systems reviewed as follows:   I am not able to complete the review of systems because:    The patient is intubated and sedated    The patient has altered mental status due to his acute medical problems    The patient has baseline aphasia from prior stroke(s)    The patient has baseline dementia and is not reliable historian                 POSITIVE= underlined text  Negative = text not underlined  General:  fever, chills, sweats, generalized weakness, weight loss/gain,      loss of appetite   Eyes:    blurred vision, eye pain, loss of vision, double vision  ENT:    rhinorrhea, pharyngitis   Respiratory:   cough, sputum production, SOB, wheezing, CASTREJON, pleuritic pain   Cardiology:   chest pain, palpitations, orthopnea, PND, edema, syncope   Gastrointestinal:  abdominal pain , N/V, dysphagia, diarrhea, constipation, bleeding   Genitourinary:  frequency, urgency, dysuria, hematuria, incontinence   Muskuloskeletal :  arthralgia, myalgia   Hematology:  easy bruising, nose or gum bleeding, lymphadenopathy   Dermatological: rash, ulceration, pruritis   Endocrine:   hot flashes or polydipsia   Neurological:  headache, dizziness, confusion, focal weakness, paresthesia,     Speech difficulties, memory loss, gait disturbance  Psychological: Feelings of anxiety, depression, agitation    Objective:   VITALS:    Visit Vitals    /67 (BP 1 Location: Right arm, BP Patient Position: At rest)    Pulse 91    Temp 98.2 °F (36.8 °C)    Resp 20    Ht 5' 5\" (1.651 m)    Wt 115.6 kg (254 lb 13.6 oz)    LMP 05/10/2016    SpO2 99%    BMI 42.41 kg/m2     Temp (24hrs), Av.9 °F (36.6 °C), Min:97.5 °F (36.4 °C), Max:98.2 °F (36.8 °C)      PHYSICAL EXAM:   General:    Alert, cooperative, no distress, appears stated age. HEENT: Atraumatic, anicteric sclerae, pink conjunctivae       mucosa moist, throat clear  Neck:  Supple, symmetrical,  thyroid: non tender  Lungs:    auscultation bilaterally. + Rhonchi. No rales. Chest wall:     No Accessory muscle use. Heart:   Regular  rhythm,      +pedal edema  Abdomen:   Soft, non-tender. Not distended. Bowel sounds normal  Extremities: No cyanosis. No clubbing  Skin:     Not pale. Not Jaundiced  No rashes   Psych:  Good insight. Not depressed. Not anxious or agitated. Neurologic: EOMs intact. No facial asymmetry. No aphasia or slurred speech. Symmetrical strength, Alert and oriented X 4.     _______________________________________________________________________  Care Plan discussed with:    Comments   Patient x    Family  x    RN     Care Manager                    Consultant:      ____________________________________________________________________  TOTAL TIME:      mins    Comments     Reviewed previous records   >50% of visit spent in counseling and coordination of care  Discussion with patient and/or family and questions answered       Critical Care Provided     Minutes non procedure based  ________________________________________________________________________  Signed: Randolph Driscoll MD      Procedures: see electronic medical records for all procedures/Xrays and details which were not copied into this note but were reviewed prior to creation of Plan.     LAB DATA REVIEWED:    Recent Results (from the past 24 hour(s))   CBC WITH AUTOMATED DIFF    Collection Time: 01/05/17  4:09 PM   Result Value Ref Range    WBC 10.9 3.6 - 11.0 K/uL    RBC 3.71 (L) 3.80 - 5.20 M/uL    HGB 9.2 (L) 11.5 - 16.0 g/dL    HCT 29.8 (L) 35.0 - 47.0 %    MCV 80.3 80.0 - 99.0 FL    MCH 24.8 (L) 26.0 - 34.0 PG    MCHC 30.9 30.0 - 36.5 g/dL    RDW 14.0 11.5 - 14.5 %    PLATELET 272 150 - 400 K/uL    NEUTROPHILS 67 32 - 75 %    LYMPHOCYTES 26 12 - 49 %    MONOCYTES 6 5 - 13 %    EOSINOPHILS 1 0 - 7 %    BASOPHILS 0 0 - 1 %    ABS. NEUTROPHILS 7.3 1.8 - 8.0 K/UL    ABS. LYMPHOCYTES 2.8 0.8 - 3.5 K/UL    ABS. MONOCYTES 0.6 0.0 - 1.0 K/UL    ABS. EOSINOPHILS 0.1 0.0 - 0.4 K/UL    ABS. BASOPHILS 0.0 0.0 - 0.1 K/UL   METABOLIC PANEL, COMPREHENSIVE    Collection Time: 01/05/17  4:09 PM   Result Value Ref Range    Sodium 141 136 - 145 mmol/L    Potassium 3.9 3.5 - 5.1 mmol/L    Chloride 106 97 - 108 mmol/L    CO2 27 21 - 32 mmol/L    Anion gap 8 5 - 15 mmol/L    Glucose 112 (H) 65 - 100 mg/dL    BUN 5 (L) 6 - 20 MG/DL    Creatinine 0.53 (L) 0.55 - 1.02 MG/DL    BUN/Creatinine ratio 9 (L) 12 - 20      GFR est AA >60 >60 ml/min/1.73m2    GFR est non-AA >60 >60 ml/min/1.73m2    Calcium 8.6 8.5 - 10.1 MG/DL    Bilirubin, total 0.4 0.2 - 1.0 MG/DL    ALT 69 12 - 78 U/L    AST 29 15 - 37 U/L    Alk.  phosphatase 76 45 - 117 U/L    Protein, total 6.6 6.4 - 8.2 g/dL    Albumin 2.4 (L) 3.5 - 5.0 g/dL    Globulin 4.2 (H) 2.0 - 4.0 g/dL    A-G Ratio 0.6 (L) 1.1 - 2.2     URINALYSIS W/ REFLEX CULTURE    Collection Time: 01/05/17  4:09 PM   Result Value Ref Range    Color YELLOW/STRAW      Appearance CLOUDY (A) CLEAR      Specific gravity 1.013 1.003 - 1.030      pH (UA) 7.5 5.0 - 8.0      Protein NEGATIVE  NEG mg/dL    Glucose NEGATIVE  NEG mg/dL    Ketone NEGATIVE  NEG mg/dL    Bilirubin NEGATIVE  NEG      Blood NEGATIVE  NEG      Urobilinogen 1.0 0.2 - 1.0 EU/dL    Nitrites NEGATIVE  NEG      Leukocyte Esterase NEGATIVE  NEG      WBC 0-4 0 - 4 /hpf    RBC 0-5 0 - 5 /hpf    Epithelial cells FEW FEW /lpf    Bacteria 1+ (A) NEG /hpf    UA:UC IF INDICATED URINE CULTURE ORDERED (A) CNI      Hyaline Cast 0-2 0 - 5 /lpf   INFLUENZA A & B AG (RAPID TEST)    Collection Time: 01/05/17  4:35 PM   Result Value Ref Range    Influenza A Antigen NEGATIVE  NEG      Influenza B Antigen NEGATIVE  NEG _____________________________  Hospitalist: Makenna Roy MD

## 2017-01-07 PROBLEM — R04.0 BLEEDING NOSE: Status: ACTIVE | Noted: 2017-01-07

## 2017-01-07 LAB
BACTERIA SPEC CULT: NORMAL
BACTERIA SPEC CULT: NORMAL
CC UR VC: NORMAL
CC UR VC: NORMAL
GLUCOSE BLD STRIP.AUTO-MCNC: 119 MG/DL (ref 65–100)
GLUCOSE BLD STRIP.AUTO-MCNC: 162 MG/DL (ref 65–100)
GLUCOSE BLD STRIP.AUTO-MCNC: 164 MG/DL (ref 65–100)
GLUCOSE BLD STRIP.AUTO-MCNC: 198 MG/DL (ref 65–100)
SERVICE CMNT-IMP: ABNORMAL
SERVICE CMNT-IMP: NORMAL
SERVICE CMNT-IMP: NORMAL

## 2017-01-07 PROCEDURE — 65410000002 HC RM PRIVATE OB

## 2017-01-07 PROCEDURE — 74011250637 HC RX REV CODE- 250/637: Performed by: OBSTETRICS & GYNECOLOGY

## 2017-01-07 PROCEDURE — 82962 GLUCOSE BLOOD TEST: CPT

## 2017-01-07 PROCEDURE — 74011250637 HC RX REV CODE- 250/637: Performed by: OTOLARYNGOLOGY

## 2017-01-07 PROCEDURE — 74011636637 HC RX REV CODE- 636/637: Performed by: OBSTETRICS & GYNECOLOGY

## 2017-01-07 PROCEDURE — 94640 AIRWAY INHALATION TREATMENT: CPT

## 2017-01-07 PROCEDURE — 74011000250 HC RX REV CODE- 250: Performed by: INTERNAL MEDICINE

## 2017-01-07 PROCEDURE — 74011250637 HC RX REV CODE- 250/637: Performed by: INTERNAL MEDICINE

## 2017-01-07 PROCEDURE — 74011636637 HC RX REV CODE- 636/637: Performed by: NURSE PRACTITIONER

## 2017-01-07 PROCEDURE — 99218 HC RM OBSERVATION: CPT

## 2017-01-07 RX ADMIN — SALINE NASAL SPRAY 2 SPRAY: 1.5 SOLUTION NASAL at 17:19

## 2017-01-07 RX ADMIN — Medication 10 ML: at 06:03

## 2017-01-07 RX ADMIN — METFORMIN HYDROCHLORIDE 1000 MG: 500 TABLET ORAL at 08:59

## 2017-01-07 RX ADMIN — INSULIN LISPRO 2 UNITS: 100 INJECTION, SOLUTION INTRAVENOUS; SUBCUTANEOUS at 10:32

## 2017-01-07 RX ADMIN — ALBUTEROL SULFATE 1.25 MG: 2.5 SOLUTION RESPIRATORY (INHALATION) at 19:44

## 2017-01-07 RX ADMIN — METFORMIN HYDROCHLORIDE 1000 MG: 500 TABLET ORAL at 17:19

## 2017-01-07 RX ADMIN — ASPIRIN 81 MG CHEWABLE TABLET 81 MG: 81 TABLET CHEWABLE at 08:59

## 2017-01-07 RX ADMIN — .BETA.-CAROTENE, ASCORBIC ACID, CHOLECALCIFEROL, .ALPHA.-TOCOPHEROL ACETATE, DL-, THIAMINE MONONITRATE, RIBOFLAVIN, NIACINAMIDE, PYRIDOXINE HYDROCHLORIDE, FOLIC ACID, CYANOCOBALAMIN, CALCIUM CARBONATE, FERROUS FUMARATE, ZINC OXIDE AND CUPRIC OXIDE 1 TABLET: 4000; 120; 400; 22; 1.84; 3; 20; 10; 1; 12; 200; 27; 25; 2 TABLET ORAL at 09:00

## 2017-01-07 RX ADMIN — ALBUTEROL SULFATE 1.25 MG: 2.5 SOLUTION RESPIRATORY (INHALATION) at 14:15

## 2017-01-07 RX ADMIN — INSULIN LISPRO 2 UNITS: 100 INJECTION, SOLUTION INTRAVENOUS; SUBCUTANEOUS at 13:46

## 2017-01-07 RX ADMIN — Medication 10 ML: at 14:00

## 2017-01-07 RX ADMIN — SALINE NASAL SPRAY 2 SPRAY: 1.5 SOLUTION NASAL at 09:00

## 2017-01-07 RX ADMIN — LINAGLIPTIN 5 MG: 5 TABLET, FILM COATED ORAL at 07:28

## 2017-01-07 RX ADMIN — Medication 10 ML: at 22:23

## 2017-01-07 RX ADMIN — AZITHROMYCIN 250 MG: 250 TABLET, FILM COATED ORAL at 08:59

## 2017-01-07 RX ADMIN — LABETALOL HCL 100 MG: 100 TABLET, FILM COATED ORAL at 08:59

## 2017-01-07 RX ADMIN — ALBUTEROL SULFATE 1.25 MG: 2.5 SOLUTION RESPIRATORY (INHALATION) at 01:45

## 2017-01-07 RX ADMIN — SALINE NASAL SPRAY 2 SPRAY: 1.5 SOLUTION NASAL at 22:24

## 2017-01-07 RX ADMIN — INSULIN LISPRO 2 UNITS: 100 INJECTION, SOLUTION INTRAVENOUS; SUBCUTANEOUS at 18:04

## 2017-01-07 RX ADMIN — PREDNISONE 40 MG: 20 TABLET ORAL at 09:00

## 2017-01-07 RX ADMIN — LABETALOL HCL 100 MG: 100 TABLET, FILM COATED ORAL at 19:52

## 2017-01-07 RX ADMIN — ALBUTEROL SULFATE 1.25 MG: 2.5 SOLUTION RESPIRATORY (INHALATION) at 08:05

## 2017-01-07 NOTE — PROGRESS NOTES
Sneezing and coughing  Up sm amt. of bld. State the tampon is coming out of her nose. Will notify charge nurse to  Assess.

## 2017-01-07 NOTE — PROGRESS NOTES
Holding peak flow this shift as pt continues to have nose bleeds, one nare with packing, she said she is having on and off nose bleed, said she had one 1/2 hr prior to this tx given at 2100, not wheezing noted, respirations are even non labored, bbs diminished.

## 2017-01-07 NOTE — PROGRESS NOTES
Bedside shift change report given to Farnaz Gallagher  (oncoming nurse) by me (offgoing nurse). Report given with SBAR, MAR and Recent Results.

## 2017-01-07 NOTE — CONSULTS
Ears/Nose/Throat Consult    Subjective:     Date of Consultation:  January 6, 2017    Referring Physician: Dr Funmilayo Nur    History of Present Illness:   Patient is a 33 y.o.  female who is being seen for Nosebleed. she  was admitted to the hospital for 28 weeks GA, Epistac  28 weeks GA, Epistac. Over the past 4 days, she has had intermittent right sided nose bleeding. It became more severe so she presented to the hospital and was packed in the right nasal cavity. BP, coags, plts all normal.  No prior history of nosebleeds. Had some short bleeding early this am around the pack, minimal as well after sneezing.      Past Medical History   Diagnosis Date    Asthma     Diabetes ([address]     Hypertension       Family History   Problem Relation Age of Onset    Diabetes Father     Heart Disease Father      CAD    Hypertension Father     Diabetes Mother     Hypertension Mother     Liver Disease Mother     Deep Vein Thrombosis Mother     Pulmonary Embolism Mother     Hypertension Sister     Deep Vein Thrombosis Sister     Pulmonary Embolism Sister     Diabetes Maternal Uncle     Cancer Maternal Grandmother      Lung Cancer      Social History   Substance Use Topics    Smoking status: Former Smoker     Packs/day: 0.10     Start date: 3/23/2004    Smokeless tobacco: Never Used    Alcohol use No     Past Surgical History   Procedure Laterality Date    Hx tympanostomy        Current Facility-Administered Medications   Medication Dose Route Frequency    labetalol (NORMODYNE) tablet 100 mg  100 mg Oral BID    prenatal vit-calcium-iron-fa (PRENATAL PLUS with CALCIUM) tablet 1 Tab  1 Tab Oral DAILY    metFORMIN (GLUCOPHAGE) tablet 1,000 mg  1,000 mg Oral BID WITH MEALS    [START ON 1/7/2017] predniSONE (DELTASONE) tablet 40 mg  40 mg Oral DAILY WITH BREAKFAST    [START ON 1/7/2017] azithromycin (ZITHROMAX) tablet 250 mg  250 mg Oral DAILY    [START ON 1/7/2017] linagliptin (TRADJENTA) tablet 5 mg  5 mg Oral ACB    aspirin chewable tablet 81 mg  81 mg Oral DAILY    [START ON 2017] insulin lispro (HUMALOG) injection   SubCUTAneous TID WITH MEALS    glucose chewable tablet 16 g  4 Tab Oral PRN    dextrose (D50W) injection syrg 12.5-25 g  12.5-25 g IntraVENous PRN    glucagon (GLUCAGEN) injection 1 mg  1 mg IntraMUSCular PRN    albuterol (PROVENTIL VENTOLIN) nebulizer solution 1.25 mg  1.25 mg Nebulization Q6H RT    sodium chloride (NS) flush 5-10 mL  5-10 mL IntraVENous Q8H    sodium chloride (NS) flush 5-10 mL  5-10 mL IntraVENous PRN    acetaminophen (TYLENOL) tablet 650 mg  650 mg Oral Q4H PRN    zolpidem (AMBIEN) tablet 5 mg  5 mg Oral QHS PRN      Allergies   Allergen Reactions    Mirapex [Pramipexole] Drowsiness    Morphine Rash    Tramadol Other (comments)     headache        Review of Systems:  A comprehensive review of systems was negative except for that written in the History of Present Illness. Objective:     Patient Vitals for the past 8 hrs:   BP Temp Pulse Resp SpO2   17 2101 - - - - 99 %   17 1917 122/62 98.1 °F (36.7 °C) 87 18 96 %   17 1531 - - - - 98 %   17 1526 - - - - 98 %   17 1521 - - - - 97 %   17 1518 - - - - 98 %   17 1513 - - - - 98 %   17 1508 125/66 98 °F (36.7 °C) 89 18 97 %   17 1507 - - - - 96 %     Temp (24hrs), Av °F (36.7 °C), Min:97.9 °F (36.6 °C), Max:98.1 °F (36.7 °C)     07 -  1900  In: -   Out: 500 [Urine:500]    Physical Exam:   General  General Appearance- Well nourished adult in no apparent distress who is alert and cooperative. Gait- Normal. Voice- Normal voice and communication. HEENT  Head: Normally developed without evidence of trauma or lesions. Face:  Skin:  Normal color, texture, and turgor. There are no lesions of concern nor swelling or induration. Lips- normal.  Facial Nerve- Bilateral- function is equal and symmetrical with no deficit.   Ear: Auricle- Left- Normal development without sinus pit, cyst or any other lesion. Right- Normal development without sinus pit, cyst or any other lesion  Auditory Canal (otoscopic examination)  Left- clean, without edema, discharge, or lesions. Right  clean, without edema, discharge, or lesions. Tympanic membrane:  Left- intact and mobile, without middle ear effusion, retraction, or sclerosis. Right- intact and mobile, without middle ear effusion, retraction or sclerosis. Mastoid- Left- No erythema, edema, tenderness, or protrusion of the auricle. Right- No erythema, edema, tenderness, or protrusion of the auricle. Nose: External Nose- Normally developed without lesion. Right nasal cavity with packing in place, no bleeding around ti. Nasal Mucosa- moist and pink without erythema, edema, or lesions. Nasal septum- Caudally the septum is relatively straight without lesion. Turbinates- Bilateral- The turbinates are without hypertrophy, edema, or lesion. Sinuses-  All sinuses are nontender to percussion. Oral Cavity/OropharynxDentition- Normal dentition for age witho no evidence of discoloration, inflammation, or infection. Oral Mucosa: moist without erythema or lesions. Tongue- no lesions or palpable masses. Floor of mouth- soft without palpable masses or mucosal lesion. Palate and uvula- Palate is without cleft and the uvula is not bifid. Soft palate elevates symmetrically. Tonsils- Bilateral -Normal in size without exudates or erythema. Salivary Ducts-  Ducts appear to be normal.  Throat: Pharynx- no bleeding from nose. Normal mucosal lining without erythema or mass. Larynx: difficult to examine directly. Neck:-- Trachea- midline with normal laryngeal framework with no crepitus. Salivary Glands- normal to palpation without nodules or tenderness. Thyroid- normal to palpation without mass or irregularity. Lymph Nodes- No palpable adenopathy or masses.   Range of Motion- good in all directions, with good anterior-posterior and lateral flexion and rotation. Assessment:     Epistaxis    Hospital Problems  Date Reviewed: 12/7/2016    None            Plan:     Currently controlled with packing. Continue preventative abx with pack in place. Recommend leaving in place until Monday. With pt living far away from hospital with ent coverage, would recommend keeping her in house until Monday and will remove pack then. Recommend saline in left nasal cavity for moisture.   Call for bleeding issues      Signed By: Raegan Felipe MD     January 6, 2017

## 2017-01-07 NOTE — PROGRESS NOTES
Hospitalist Progress Note    NAME: Seema Burleson   :  1985   MRN:  [id number]       Interim Hospital Summary: 33 y.o. female whom presented on 2017 with asthma exacerbation. Pt is in 28 weeks of gestation. Main provider: OBGYN     Assessment / Plan:  Epistaxis - controlled with rhinorocket at this time as per ENT  -pt says she came here due to this complaint; she is now s/p tamponade in the R nostril  -INR 0.9,    -blood pressure xiang appears to be WNL - monitor  -ENT following- removal as per them    Asthma exacerbation POA- resolved  -she does not complain of SOB but does report cough for 2-3 days  -continue Azithromycin x 5 days today in absence of CXR (dont believe she needs one now)  DC prednisone as pt not wheezing today  - continue with nebulizer treatments    WILL SIGN OFF today as not much to add to care, recall if needed again    HTN  -cont labetolol    DM  Cont metformin as per OBGYN  Lispro as needed  -monitor FSG; recommend diabetic diet    28 weeks pregnant   GYN following as primary attending- DC as per the primary team when cleared by ENT     Code Status: full  DVT Prophylaxis: Per gyn    Recommended Disposition: Home w/Family     Subjective:     Chief Complaint / Reason for Physician Visit  \"I feel so much better now\". Discussed with RN events overnight. Review of Systems:  Symptom Y/N Comments  Symptom Y/N Comments   Fever/Chills n   Chest Pain n    Poor Appetite n   Edema     Cough y productive  Abdominal Pain     Sputum n   Joint Pain n    SOB/CASTREJON y improved  Pruritis/Rash n    Nausea/vomit n   Tolerating PT/OT y    Diarrhea n   Tolerating Diet y    Constipation    Other       Could NOT obtain due to:      Objective:     VITALS:   Last 24hrs VS reviewed since prior progress note.  Most recent are:  Patient Vitals for the past 24 hrs:   Temp Pulse Resp BP SpO2   17 0725 97.6 °F (36.4 °C) 81 19 113/59 98 %   17 0601 98 °F (36.7 °C) 78 16 109/52 97 %   17 3566 - - - - 100 %   01/06/17 2101 - - - - 99 %   01/06/17 1917 98.1 °F (36.7 °C) 87 18 122/62 96 %   01/06/17 1531 - - - - 98 %   01/06/17 1526 - - - - 98 %   01/06/17 1521 - - - - 97 %   01/06/17 1518 - - - - 98 %   01/06/17 1513 - - - - 98 %   01/06/17 1508 98 °F (36.7 °C) 89 18 125/66 97 %   01/06/17 1507 - - - - 96 %   01/06/17 1350 - - - - 98 %     No intake or output data in the 24 hours ending 01/07/17 1118     PHYSICAL EXAM:  General: WD, WN. Alert, cooperative, no acute distress    EENT:  EOMI. Anicteric sclerae. MMM  Resp:  No wheezing today noted, good air entry bilaterally,  No accessory muscle use  CV:  Regular  rhythm,  No edema  GI:  Soft, Non distended, Non tender.  +Bowel sounds  Neurologic:  Alert and oriented X 3, normal speech,   Psych:   Good insight. Not anxious nor agitated  Skin:  No rashes. No jaundice    Reviewed most current lab test results and cultures  YES  Reviewed most current radiology test results   YES  Review and summation of old records today    NO  Reviewed patient's current orders and MAR    YES  PMH/ reviewed - no change compared to H&P  ________________________________________________________________________  Care Plan discussed with:    Comments   Patient x    Family  x At bedside   RN x    Care Manager     Consultant                        Multidiciplinary team rounds were held today with , nursing, pharmacist and clinical coordinator. Patient's plan of care was discussed; medications were reviewed and discharge planning was addressed.      ________________________________________________________________________  Total NON critical care TIME:  25   Minutes    Total CRITICAL CARE TIME Spent:   Minutes non procedure based      Comments   >50% of visit spent in counseling and coordination of care     ________________________________________________________________________  Ruma Dunaway MD     Procedures: see electronic medical records for all procedures/Xrays and details which were not copied into this note but were reviewed prior to creation of Plan. LABS:  I reviewed today's most current labs and imaging studies.   Pertinent labs include:  Recent Labs      01/06/17 0153 01/05/17   1609   WBC  11.6*  10.9   HGB  9.3*  9.2*   HCT  30.0*  29.8*   PLT  265  272     Recent Labs      01/06/17 0153 01/05/17 2049 01/05/17   1609   NA   --    --   141   K   --    --   3.9   CL   --    --   106   CO2   --    --   27   GLU   --    --   112*   BUN   --    --   5*   CREA  0.61   --   0.53*   CA   --    --   8.6   ALB   --    --   2.4*   TBILI  0.4   --   0.4   SGOT  26   --   29   ALT  67   --   69   INR   --   0.9   --        Signed: )Shekhar Hernandez MD

## 2017-01-07 NOTE — PROGRESS NOTES
Ante Partum High Risk Pregnancy Note    Patient admitted for chronic hypertension, diabetes - Type 2 and asthma and epistaxis states she does not  have  abdominal pain  , right upper quadrant pain  , vaginal bleeding , swelling and vaginal leaking of fluid . Vitals: Temp (24hrs), Av.9 °F (36.6 °C), Min:97.6 °F (36.4 °C), Max:98.1 °F (36.7 °C)   Patient Vitals for the past 24 hrs:   BP   17 0725 113/59   17 0601 109/52   17 1917 122/62   17 1508 125/66   17 1108 116/51       I&O:                  1901 -  0700  In: -   Out: 500 [Urine:500]    Exam:  Patient without distress. Abdomen: soft, non-tender               Fundus: soft and non tender                           Right Upper Quadrant: non-tender               Perineum: No sign of blood or amniotic fluid               Lower Extremities: No               Patellar Reflexes: 2+ bilaterally                      Lab/Data Review: All lab results for the last 24 hours reviewed. Assessment and Plan: Active Problems:    * No active hospital problems. *        Diabetes-Type 2:  Treat with Oral hypoglycemics and insulin as needed   CHTN, ASTHMA, URI, and Epistaxis   1. CHTN - currently well controlled  2. DM - continue home regimen oral hypoglycemic agents with sliding scale coverage as indicated;   3. Asthma - hospitalist following; exacerbation thought to be due to URI; continue z-pack; pt reports improvement; normal saturations; respiratory seeing for treatments  4. Epistaxis - management by ENT; current nasal tamponade; will allow ENT to removed  5.   Continue current management

## 2017-01-08 LAB
GLUCOSE BLD STRIP.AUTO-MCNC: 130 MG/DL (ref 65–100)
GLUCOSE BLD STRIP.AUTO-MCNC: 132 MG/DL (ref 65–100)
GLUCOSE BLD STRIP.AUTO-MCNC: 154 MG/DL (ref 65–100)
GLUCOSE BLD STRIP.AUTO-MCNC: 164 MG/DL (ref 65–100)
SERVICE CMNT-IMP: ABNORMAL

## 2017-01-08 PROCEDURE — 65410000002 HC RM PRIVATE OB

## 2017-01-08 PROCEDURE — 74011636637 HC RX REV CODE- 636/637: Performed by: OBSTETRICS & GYNECOLOGY

## 2017-01-08 PROCEDURE — 74011250637 HC RX REV CODE- 250/637: Performed by: INTERNAL MEDICINE

## 2017-01-08 PROCEDURE — 74011250637 HC RX REV CODE- 250/637: Performed by: SPECIALIST

## 2017-01-08 PROCEDURE — 94640 AIRWAY INHALATION TREATMENT: CPT

## 2017-01-08 PROCEDURE — 82962 GLUCOSE BLOOD TEST: CPT

## 2017-01-08 PROCEDURE — 99218 HC RM OBSERVATION: CPT

## 2017-01-08 PROCEDURE — 74011000250 HC RX REV CODE- 250: Performed by: INTERNAL MEDICINE

## 2017-01-08 PROCEDURE — 74011250637 HC RX REV CODE- 250/637: Performed by: OBSTETRICS & GYNECOLOGY

## 2017-01-08 RX ADMIN — ALBUTEROL SULFATE 1.25 MG: 2.5 SOLUTION RESPIRATORY (INHALATION) at 07:21

## 2017-01-08 RX ADMIN — Medication 5 ML: at 08:20

## 2017-01-08 RX ADMIN — INSULIN LISPRO 2 UNITS: 100 INJECTION, SOLUTION INTRAVENOUS; SUBCUTANEOUS at 10:43

## 2017-01-08 RX ADMIN — Medication 10 ML: at 21:08

## 2017-01-08 RX ADMIN — METFORMIN HYDROCHLORIDE 1000 MG: 500 TABLET ORAL at 08:26

## 2017-01-08 RX ADMIN — ALBUTEROL SULFATE 1.25 MG: 2.5 SOLUTION RESPIRATORY (INHALATION) at 01:12

## 2017-01-08 RX ADMIN — SALINE NASAL SPRAY 2 SPRAY: 1.5 SOLUTION NASAL at 16:04

## 2017-01-08 RX ADMIN — Medication 5 ML: at 16:03

## 2017-01-08 RX ADMIN — LABETALOL HCL 100 MG: 100 TABLET, FILM COATED ORAL at 21:07

## 2017-01-08 RX ADMIN — LINAGLIPTIN 5 MG: 5 TABLET, FILM COATED ORAL at 08:21

## 2017-01-08 RX ADMIN — INSULIN LISPRO 2 UNITS: 100 INJECTION, SOLUTION INTRAVENOUS; SUBCUTANEOUS at 18:38

## 2017-01-08 RX ADMIN — SALINE NASAL SPRAY 2 SPRAY: 1.5 SOLUTION NASAL at 08:33

## 2017-01-08 RX ADMIN — AZITHROMYCIN 250 MG: 250 TABLET, FILM COATED ORAL at 08:24

## 2017-01-08 RX ADMIN — ALBUTEROL SULFATE 1.25 MG: 2.5 SOLUTION RESPIRATORY (INHALATION) at 14:17

## 2017-01-08 RX ADMIN — ALBUTEROL SULFATE 1.25 MG: 2.5 SOLUTION RESPIRATORY (INHALATION) at 20:01

## 2017-01-08 RX ADMIN — LABETALOL HCL 100 MG: 100 TABLET, FILM COATED ORAL at 08:22

## 2017-01-08 RX ADMIN — ACETAMINOPHEN 650 MG: 325 TABLET, FILM COATED ORAL at 11:43

## 2017-01-08 RX ADMIN — ASPIRIN 81 MG CHEWABLE TABLET 81 MG: 81 TABLET CHEWABLE at 08:25

## 2017-01-08 RX ADMIN — METFORMIN HYDROCHLORIDE 1000 MG: 500 TABLET ORAL at 17:36

## 2017-01-08 RX ADMIN — .BETA.-CAROTENE, ASCORBIC ACID, CHOLECALCIFEROL, .ALPHA.-TOCOPHEROL ACETATE, DL-, THIAMINE MONONITRATE, RIBOFLAVIN, NIACINAMIDE, PYRIDOXINE HYDROCHLORIDE, FOLIC ACID, CYANOCOBALAMIN, CALCIUM CARBONATE, FERROUS FUMARATE, ZINC OXIDE AND CUPRIC OXIDE 1 TABLET: 4000; 120; 400; 22; 1.84; 3; 20; 10; 1; 12; 200; 27; 25; 2 TABLET ORAL at 08:23

## 2017-01-08 NOTE — PROGRESS NOTES
1144 tylenol 650mg po for pain 6/10 lt side back & abdomen no contx noted on NST [id number]    1244 pt feels better since tylenol 3/10     1315 blood sugar 1 hr after lunch 132 no insulin required pt resting in bed watching TV     1840 pt resting in bed watching tv. No c/o's. VS-done stable afebrile no bleeding. Blood sugar after dinner 154 2 units insulin given in abdomen. Saline flushed well today #20 left  Forearm. 1915 [facility] turned over at this time.

## 2017-01-08 NOTE — PROGRESS NOTES
Bedside shift change report given to [address] [place] [address]  (oncoming nurse) by me  (offgoing nurse). Report given with SBAR, MAR and Recent Results.

## 2017-01-08 NOTE — PROGRESS NOTES
0730 bedside report from [facility] 112.  [facility] RNC  Pt is 29/0 pt of Dr. Demi Gomez   Hx GDM, HTN   Nosebleeds

## 2017-01-08 NOTE — PROGRESS NOTES
Ante Partum High Risk Pregnancy Note    Patient admitted for Epistaxis states she does not  have  abdominal pain  , contractions, right upper quadrant pain  , vaginal bleeding , swelling and vaginal leaking of fluid . Vitals: Temp (24hrs), Av.2 °F (36.8 °C), Min:97.7 °F (36.5 °C), Max:98.5 °F (36.9 °C)   Patient Vitals for the past 24 hrs:   BP   17 1952 124/66   17 1949 124/66   17 1638 134/62       I&O:                  1901 -  0700  In: -   Out: 250 [Urine:250]    Exam:  Patient without distress. Abdomen: soft, non-tender               Fundus: soft and non tender                             Right Upper Quadrant: non-tender               Perineum: No sign of blood or amniotic fluid               Lower Extremities: No               Patellar Reflexes: 2+ bilaterally                  Lab/Data Review:  BMP: No results found for: NA, K, CL, CO2, AGAP, GLU, BUN, CREA, GFRAA, GFRNA  CMP: No results found for: NA, K, CL, CO2, AGAP, GLU, BUN, CREA, GFRAA, GFRNA, CA, MG, PHOS, ALB, TBIL, TP, ALB, GLOB, AGRAT, SGOT, ALT, GPT  CBC: No results found for: WBC, HGB, HGBEXT, HCT, HCTEXT, PLT, PLTEXT, HGBEXT, HCTEXT, PLTEXT    Assessment and Plan: Active Problems:    Bleeding nose (2017)          Diabetes-Type 2:  Treat with Oral hypoglycemics and insulin as needed   1.   Epistaxis - pt reports one hour long bleed overnight with tamponade in place. Pt reports applying ice to the area. Will  Appreciate further recommendations from ENT. 2.  HTN - well controlled  3. DM - continue regimen  4. Asthma - saturating well; pt reports feeling better  5.   All questions answered

## 2017-01-09 VITALS
OXYGEN SATURATION: 97 % | BODY MASS INDEX: 42.46 KG/M2 | RESPIRATION RATE: 18 BRPM | SYSTOLIC BLOOD PRESSURE: 114 MMHG | WEIGHT: 254.85 LBS | TEMPERATURE: 98 F | HEART RATE: 87 BPM | HEIGHT: 65 IN | DIASTOLIC BLOOD PRESSURE: 62 MMHG

## 2017-01-09 LAB
GLUCOSE BLD STRIP.AUTO-MCNC: 106 MG/DL (ref 65–100)
GLUCOSE BLD STRIP.AUTO-MCNC: 144 MG/DL (ref 65–100)
GLUCOSE BLD STRIP.AUTO-MCNC: 177 MG/DL (ref 65–100)
SERVICE CMNT-IMP: ABNORMAL

## 2017-01-09 PROCEDURE — 74011250637 HC RX REV CODE- 250/637: Performed by: OTOLARYNGOLOGY

## 2017-01-09 PROCEDURE — 82962 GLUCOSE BLOOD TEST: CPT

## 2017-01-09 PROCEDURE — 94640 AIRWAY INHALATION TREATMENT: CPT

## 2017-01-09 PROCEDURE — 74011636637 HC RX REV CODE- 636/637: Performed by: OBSTETRICS & GYNECOLOGY

## 2017-01-09 PROCEDURE — 74011250637 HC RX REV CODE- 250/637: Performed by: OBSTETRICS & GYNECOLOGY

## 2017-01-09 PROCEDURE — 74011000250 HC RX REV CODE- 250: Performed by: INTERNAL MEDICINE

## 2017-01-09 PROCEDURE — 74011250637 HC RX REV CODE- 250/637: Performed by: INTERNAL MEDICINE

## 2017-01-09 RX ORDER — OXYMETAZOLINE HCL 0.05 %
2 SPRAY, NON-AEROSOL (ML) NASAL
Status: DISCONTINUED | OUTPATIENT
Start: 2017-01-09 | End: 2017-01-09 | Stop reason: HOSPADM

## 2017-01-09 RX ADMIN — LABETALOL HCL 100 MG: 100 TABLET, FILM COATED ORAL at 07:28

## 2017-01-09 RX ADMIN — ALBUTEROL SULFATE 1.25 MG: 2.5 SOLUTION RESPIRATORY (INHALATION) at 02:04

## 2017-01-09 RX ADMIN — SALINE NASAL SPRAY 2 SPRAY: 1.5 SOLUTION NASAL at 08:33

## 2017-01-09 RX ADMIN — .BETA.-CAROTENE, ASCORBIC ACID, CHOLECALCIFEROL, .ALPHA.-TOCOPHEROL ACETATE, DL-, THIAMINE MONONITRATE, RIBOFLAVIN, NIACINAMIDE, PYRIDOXINE HYDROCHLORIDE, FOLIC ACID, CYANOCOBALAMIN, CALCIUM CARBONATE, FERROUS FUMARATE, ZINC OXIDE AND CUPRIC OXIDE 1 TABLET: 4000; 120; 400; 22; 1.84; 3; 20; 10; 1; 12; 200; 27; 25; 2 TABLET ORAL at 08:32

## 2017-01-09 RX ADMIN — INSULIN LISPRO 2 UNITS: 100 INJECTION, SOLUTION INTRAVENOUS; SUBCUTANEOUS at 09:58

## 2017-01-09 RX ADMIN — METFORMIN HYDROCHLORIDE 1000 MG: 500 TABLET ORAL at 08:33

## 2017-01-09 RX ADMIN — ASPIRIN 81 MG CHEWABLE TABLET 81 MG: 81 TABLET CHEWABLE at 08:33

## 2017-01-09 RX ADMIN — AZITHROMYCIN 250 MG: 250 TABLET, FILM COATED ORAL at 08:33

## 2017-01-09 RX ADMIN — LINAGLIPTIN 5 MG: 5 TABLET, FILM COATED ORAL at 07:25

## 2017-01-09 RX ADMIN — OXYMETAZOLINE HYDROCHLORIDE 2 SPRAY: 5 SPRAY NASAL at 14:26

## 2017-01-09 RX ADMIN — Medication 10 ML: at 07:29

## 2017-01-09 RX ADMIN — ALBUTEROL SULFATE 2.5 MG: 2.5 SOLUTION RESPIRATORY (INHALATION) at 08:13

## 2017-01-09 NOTE — DISCHARGE INSTRUCTIONS
Weeks 26 to 30 of Your Pregnancy: Care Instructions  Your Care Instructions    You are now in your last trimester of pregnancy. Your baby is growing rapidly. And you'll probably feel your baby moving around more often. Your doctor may ask you to count your baby's kicks. Your back may ache as your body gets used to your baby's size and length. If you haven't already had the Tdap shot during this pregnancy, talk to your doctor about getting it. It will help protect your  against pertussis infection. During this time, it's important to take care of yourself and pay attention to what your body needs. If you feel sexual, explore ways to be close with your partner that match your comfort and desire. Use the tips provided in this care sheet to find ways to be sexual in your own way. Follow-up care is a key part of your treatment and safety. Be sure to make and go to all appointments, and call your doctor if you are having problems. It's also a good idea to know your test results and keep a list of the medicines you take. How can you care for yourself at home? Take it easy at work  · Take frequent breaks. If possible, stop working when you are tired, and rest during your lunch hour. · Take bathroom breaks every 2 hours. · Change positions often. If you sit for long periods, stand up and walk around. · When you stand for a long time, keep one foot on a low stool with your knee bent. After standing a lot, sit with your feet up. · Avoid fumes, chemicals, and tobacco smoke. Be sexual in your own way  · Having sex during pregnancy is okay, unless your doctor tells you not to. · You may be very interested in sex, or you may have no interest at all. · Your growing belly can make it hard to find a good position during intercourse. [place] and explore. · You may get cramps in your uterus when your partner touches your breasts.   · A back rub may relieve the backache or cramps that sometimes follow orgasm. Learn about  labor  · Watch for signs of  labor. You may be going into labor if:  ¨ You have menstrual-like cramps, with or without nausea. ¨ You have about 4 or more contractions in 20 minutes, or about 8 or more within 1 hour, even after you have had a glass of water and are resting. ¨ You have a low, dull backache that does not go away when you change your position. ¨ You have pain or pressure in your pelvis that comes and goes in a pattern. ¨ You have intestinal cramping or flu-like symptoms, with or without diarrhea. ¨ You notice an increase or change in your vaginal discharge. Discharge may be heavy, mucus-like, watery, or streaked with blood. ¨ Your water breaks. · If you think you have  labor:  ¨ Drink 2 or 3 glasses of water or juice. Not drinking enough fluids can cause contractions. ¨ Stop what you are doing, and empty your bladder. Then lie down on your left side for at least 1 hour. ¨ While lying on your side, find your breast bone. Put your fingers in the soft spot just below it. Move your fingers down toward your belly button to find the top of your uterus. Check to see if it is tight. ¨ Contractions can be weak or strong. Record your contractions for an hour. Time a contraction from the start of one contraction to the start of the next one. ¨ Single or several strong contractions without a pattern are called Sergei-Mcgarry contractions. They are practice contractions but not the start of labor. They often stop if you change what you are doing. ¨ Call your doctor if you have regular contractions. Where can you learn more? Go to http://sheree-frank.info/. Enter J297 in the search box to learn more about \"Weeks 26 to 30 of Your Pregnancy: Care Instructions. \"  Current as of: May 30, 2016  Content Version: 11.1  © 6181-1524 [employer].  Care instructions adapted under license by [employer] (which disclaims liability or warranty for this information). If you have questions about a medical condition or this instruction, always ask your healthcare professional. [address] any warranty or liability for your use of this information. Nosebleeds: Care Instructions  Your Care Instructions    Nosebleeds are common, especially if you have colds or allergies. Many things can cause a nosebleed. Some nosebleeds stop on their own with pressure. Others need packing. Some get cauterized (sealed). If you have gauze or other packing materials in your nose, you will need to follow up with your doctor to have the packing removed. You may need more treatment if you get nosebleeds a lot. The doctor has checked you carefully, but problems can develop later. If you notice any problems or new symptoms, get medical treatment right away. Follow-up care is a key part of your treatment and safety. Be sure to make and go to all appointments, and call your doctor if you are having problems. It's also a good idea to know your test results and keep a list of the medicines you take. How can you care for yourself at home? · If you get another nosebleed:  ¨ Sit up and tilt your head slightly forward. This keeps blood from going down your throat. ¨ Use your thumb and index finger to pinch your nose shut for 10 minutes. Use a clock. Do not check to see if the bleeding has stopped before the 10 minutes are up. If the bleeding has not stopped, pinch your nose shut for another 10 minutes. ¨ When the bleeding has stopped, try not to pick, rub, or blow your nose for 12 hours. Avoiding these things helps keep your nose from bleeding again. · If your doctor prescribed antibiotics, take them as directed. Do not stop taking them just because you feel better. You need to take the full course of antibiotics. To prevent nosebleeds  · Do not blow your nose too hard. · Try not to lift or strain after a nosebleed.   · Raise your head on a pillow while you sleep. · Put a thin layer of a saline- or water-based nasal gel, such as NasoGel, inside your nose. Put it on the septum, which divides your nostrils. This will prevent dryness that can cause nosebleeds. · Use a vaporizer or humidifier to add moisture to your bedroom. Follow the directions for cleaning the machine. · Do not use aspirin, ibuprofen (Advil, Motrin), or naproxen (Aleve) for 36 to 48 hours after a nosebleed unless your doctor tells you to. You can use acetaminophen (Tylenol) for pain relief. · Talk to your doctor about stopping any other medicines you are taking. Some medicines may make you more likely to get a nosebleed. · Do not use cold medicines or nasal sprays without first talking to your doctor. They can make your nose dry. When should you call for help? Call 911 anytime you think you may need emergency care. For example, call if:  · You passed out (lost consciousness). Call your doctor now or seek immediate medical care if:  · You get another nosebleed and your nose is still bleeding after you have applied pressure 3 times for 10 minutes each time (30 minutes total). · There is a lot of blood running down the back of your throat even after you pinch your nose and tilt your head forward. · You have a fever. · You have sinus pain. Watch closely for changes in your health, and be sure to contact your doctor if:  · You get nosebleeds often, even if they stop. · You do not get better as expected. Where can you learn more? Go to http://sheree-frank.info/. Enter S156 in the search box to learn more about \"Nosebleeds: Care Instructions. \"  Current as of: May 27, 2016  Content Version: 11.1  © 3578-7580 [employer]. Care instructions adapted under license by [employer] (which disclaims liability or warranty for this information).  If you have questions about a medical condition or this instruction, always ask your healthcare professional. [address] any warranty or liability for your use of this information.

## 2017-01-09 NOTE — PROGRESS NOTES
Pt resting no complaints voiced . No sign of active bleeding noted from right nare , packing remains in place.      [facility]

## 2017-01-09 NOTE — PROGRESS NOTES
Ante Partum Progress Note    Seema Burleson  83O3H    Patient states she has no new complaints    Vitals: Patient Vitals for the past 24 hrs:   BP Temp Pulse Resp SpO2   01/09/17 0203 - - - - 98 %   01/08/17 2107 131/66 98.1 °F (36.7 °C) 93 20 99 %   01/08/17 2106 - - - - 99 %   01/08/17 2000 - - - - 98 %   01/08/17 1840 123/67 98.3 °F (36.8 °C) 85 20 98 %   01/08/17 1610 117/64 98.2 °F (36.8 °C) 87 20 98 %   01/08/17 1138 130/60 - 87 - -   01/08/17 1136 - - - - 99 %   01/08/17 1135 - - - - 99 %   01/08/17 1132 - - - - 97 %   01/08/17 1129 - - - - 96 %   01/08/17 1124 - - - - 98 %   01/08/17 1119 - - - - 99 %   01/08/17 1114 - - - - 99 %   01/08/17 0810 - - - - 98 %   01/08/17 0809 113/62 97.8 °F (36.6 °C) 97 20 97 %       Intake and Output:   Current shift:     Last 3 completed shifts: 01/07 1901 - 01/09 0700  In: -   Out: 250 [Urine:250]    Non stress test:  Reactive    Uterine Activity: None     Exam:  Patient without distress.      Abdomen, fundus soft non-tender     Extremities, no redness or tenderness               Additional Exam: Patient without distress    Labs:     Lab Results   Component Value Date/Time    WBC 11.6 01/06/2017 01:53 AM    WBC 10.9 01/05/2017 04:09 PM    WBC 12.2 12/24/2016 08:05 AM    HGB 9.3 01/06/2017 01:53 AM    HGB 9.2 01/05/2017 04:09 PM    HGB 9.2 12/24/2016 08:05 AM    HCT 30.0 01/06/2017 01:53 AM    HCT 29.8 01/05/2017 04:09 PM    HCT 30.0 12/24/2016 08:05 AM    PLATELET 551 86/53/1785 01:53 AM    PLATELET 384 19/10/4670 04:09 PM    PLATELET 613 29/40/5122 08:05 AM       Recent Results (from the past 24 hour(s))   GLUCOSE, POC    Collection Time: 01/08/17  8:16 AM   Result Value Ref Range    Glucose (POC) 130 (H) 65 - 100 mg/dL    Performed by George Sample    GLUCOSE, POC    Collection Time: 01/08/17 10:17 AM   Result Value Ref Range    Glucose (POC) 164 (H) 65 - 100 mg/dL    Performed by George Sample    GLUCOSE, POC    Collection Time: 01/08/17  1:16 PM   Result Value Ref Range    Glucose (POC) 132 (H) 65 - 100 mg/dL    Performed by Concepcion Nance    GLUCOSE, POC    Collection Time: 01/08/17  6:22 PM   Result Value Ref Range    Glucose (POC) 154 (H) 65 - 100 mg/dL    Performed by Concepcion April        Assessment: 29w1d   Diabetes-Type 2  Epistaxis   HTN   Asthma     Plan:    Diabetes-Type 2: cont Oral hypoglycemics and insulin prn1. Epistaxis -recommendations per ENT.

## 2017-01-09 NOTE — PROGRESS NOTES
26  Spoke with Dr. Arnetta Apgar via telephone, orders given to remove packing from pt nostril, order Afrin nasal spray, and have epitaxis tray available. Orders given to call Dr. Arnetta Apgar 30 mins after procedure for update on pt.      1229 Removed packing from R nostril. No active bleeding noted. Pt states no complaints. Will continue to monitor. 56  Spoke with Dr. Arnetta Apgar regarding pt status, pt doing well, tolerated procedure well. No bleeding from nose noted. Orders given to discharge pt after 1.5 hrs of observation. 1430 Patient discharged home. Discharge instructions reviewed with patient. Patient verbalized understanding.

## 2017-01-11 ENCOUNTER — OFFICE VISIT (OUTPATIENT)
Dept: INTERNAL MEDICINE CLINIC | Age: 32
End: 2017-01-11

## 2017-01-11 VITALS
HEART RATE: 84 BPM | RESPIRATION RATE: 20 BRPM | DIASTOLIC BLOOD PRESSURE: 47 MMHG | OXYGEN SATURATION: 100 % | TEMPERATURE: 97.2 F | SYSTOLIC BLOOD PRESSURE: 98 MMHG | HEIGHT: 65 IN

## 2017-01-11 DIAGNOSIS — E11.65 TYPE 2 DIABETES MELLITUS WITH HYPERGLYCEMIA, WITHOUT LONG-TERM CURRENT USE OF INSULIN (HCC): ICD-10-CM

## 2017-01-11 DIAGNOSIS — Z3A.30 30 WEEKS GESTATION OF PREGNANCY: ICD-10-CM

## 2017-01-11 DIAGNOSIS — J45.909 UNCOMPLICATED ASTHMA, UNSPECIFIED ASTHMA SEVERITY: Primary | ICD-10-CM

## 2017-01-11 RX ORDER — METFORMIN HYDROCHLORIDE 1000 MG/1
1000 TABLET ORAL 2 TIMES DAILY
Qty: 60 TAB | Refills: 5 | Status: SHIPPED | OUTPATIENT
Start: 2017-01-11 | End: 2017-01-18 | Stop reason: SDUPTHER

## 2017-01-11 RX ORDER — AZITHROMYCIN 250 MG/1
TABLET, FILM COATED ORAL
Qty: 6 TAB | Refills: 0 | Status: SHIPPED | OUTPATIENT
Start: 2017-01-11 | End: 2017-01-18 | Stop reason: ALTCHOICE

## 2017-01-11 RX ORDER — ALBUTEROL SULFATE 0.83 MG/ML
2.5 SOLUTION RESPIRATORY (INHALATION)
Qty: 100 EACH | Refills: 5 | Status: SHIPPED | OUTPATIENT
Start: 2017-01-11 | End: 2019-04-03 | Stop reason: SDUPTHER

## 2017-01-11 RX ORDER — CODEINE PHOSPHATE AND GUAIFENESIN 10; 100 MG/5ML; MG/5ML
5 SOLUTION ORAL
Qty: 120 ML | Refills: 0 | Status: SHIPPED | OUTPATIENT
Start: 2017-01-11 | End: 2017-01-18 | Stop reason: ALTCHOICE

## 2017-01-11 NOTE — PROGRESS NOTES
HISTORY OF PRESENT ILLNESS  Ivonne Vidal is a 33 y.o. female. Asthma   The history is provided by the patient. This is a recurrent problem. The current episode started more than 1 week ago. The problem occurs constantly. The problem has been gradually improving. Associated symptoms include shortness of breath. Pertinent negatives include no chest pain. Treatments tried: admitted to hospital ED [facility] [place] on Z pack 2 days ago. The treatment provided mild relief. pregnancy so far has been doing okay. States her gynecologist wanted her to come in here for treatment of her asthma. Her blood sugars have been doing well in the low 100s, diabetes medicines as below. Current Outpatient Prescriptions   Medication Sig Dispense Refill    metFORMIN (GLUCOPHAGE) 1,000 mg tablet Take 1 Tab by mouth two (2) times a day. 60 Tab 5    azithromycin (ZITHROMAX Z-ALEX) 250 mg tablet Take 2 tabs today; then take 1 tab daily for 4 days 6 Tab 0    guaiFENesin-codeine (ROBITUSSIN AC) 100-10 mg/5 mL solution Take 5 mL by mouth four (4) times daily as needed for Cough for up to 7 days. Max Daily Amount: 20 mL. 120 mL 0    albuterol (PROVENTIL VENTOLIN) 2.5 mg /3 mL (0.083 %) nebulizer solution 3 mL by Nebulization route every four (4) hours as needed for Wheezing. 100 Each 5    SITagliptin (JANUVIA) 100 mg tablet Take 1 Tab by mouth daily. Appointment needed to refill this medication again. 30 Tab 5    glyBURIDE (DIABETA) 1.25 mg tablet Take 1/2 pill if you have an evening snack 30 Tab 3    labetalol (NORMODYNE) 100 mg tablet Take 100 mg by mouth two (2) times a day.  PRENATAL VIT#96/FERROUS FUM/FA (PRENATAL VITAMIN) 27 mg iron- 800 mcg tab tablet daily.  aspirin 81 mg chewable tablet Take 1 Tab by mouth daily.  30 Tab 5    Lancets misc Use up to three times per days; 1 Each 11    glucose blood VI test strips (ASCENSIA AUTODISC VI, ONE TOUCH ULTRA TEST VI) strip Use up to four times daily 100 Strip 5    Blood-Glucose Meter monitoring kit Use up to three times per days 1 Kit 0    albuterol (PROAIR HFA) 90 mcg/actuation inhaler Take 2 Puffs by inhalation every four (4) hours as needed for Wheezing. 1 Inhaler 5    PEN NEEDLE 31 X 5/16 \" ndle   0    diphenoxylate-atropine (LOMOTIL) 2.5-0.025 mg per tablet Take 1 Tab by mouth four (4) times daily as needed for Diarrhea. Max Daily Amount: 4 Tabs. 20 Tab 0     Allergies   Allergen Reactions    Mirapex [Pramipexole] Drowsiness    Morphine Rash    Tramadol Other (comments)     headache         Review of Systems   Constitutional: Negative for fever. HENT: Negative for nosebleeds. Respiratory: Positive for cough, sputum production and shortness of breath. Negative for hemoptysis. Cardiovascular: Negative for chest pain. Genitourinary:        No bleeding pain or discharge, baby moving kicking. Physical Exam  Visit Vitals    BP 98/47 (BP 1 Location: Left arm, BP Patient Position: Sitting)    Pulse 84    Temp 97.2 °F (36.2 °C) (Oral)    Resp 20    Ht 5' 5\" (1.651 m)    LMP 05/10/2016    SpO2 100%     L/min     WD WN female NAD  Heart RRR without murmers clicks or rubs  Lungs wheezing bilaterally  Abdo soft nontender pregnant  Ext no edema    ASSESSMENT and PLAN      ICD-10-CM ICD-9-CM    1. Uncomplicated asthma, unspecified asthma severity J45.909 493.90 ALBUTEROL, INHAL. SOL., FDA-APPROVED FINAL, NON-COMPOUND UNIT DOSE, 1 MG      INHAL RX, AIRWAY OBST/DX SPUTUM INDUCT      VA VITAL CAPACITY TEST      CANCELED: VA VITAL CAPACITY TEST   2.  Type 2 diabetes mellitus with hyperglycemia, without long-term current use of insulin ([facility]) E11.65 250.00      790.29    3. 30 weeks gestation of pregnancy Z3A.30 V22.2        Orders Placed This Encounter    INHAL RX, AIRWAY OBST/DX SPUTUM INDUCT ([id number])    metFORMIN (GLUCOPHAGE) 1,000 mg tablet    azithromycin (ZITHROMAX Z-ALEX) 250 mg tablet    guaiFENesin-codeine (ROBITUSSIN AC) 100-10 mg/5 mL solution    albuterol (PROVENTIL VENTOLIN) 2.5 mg /3 mL (0.083 %) nebulizer solution     Follow-up Disposition:  Return in about 1 week (around 1/18/2017).

## 2017-01-11 NOTE — MR AVS SNAPSHOT
Visit Information Date & Time Provider Department Dept. Phone Encounter #  
 1/11/2017 10:00 AM Betina Carreno  Fransisco Velazquez [id number] Follow-up Instructions Return in about 1 week (around 1/18/2017). Your Appointments 1/25/2017 12:30 PM  
Follow Up with MD Eloise Hernandez Diabetes and Endocrinology [facility] [address] [phone] [address] [place] [address] Upcoming Health Maintenance Date Due Pneumococcal 19-64 Medium Risk (1 of 1 - PPSV23) 10/14/2002 LIPID PANEL Q1 4/10/2016 INFLUENZA AGE 9 TO ADULT 8/1/2016 PAP AKA CERVICAL CYTOLOGY 5/6/2017 HEMOGLOBIN A1C Q6M 6/7/2017 MICROALBUMIN Q1 6/21/2017 EYE EXAM RETINAL OR DILATED Q1 7/18/2017 FOOT EXAM Q1 11/11/2017 DTaP/Tdap/Td series (2 - Td) 7/6/2026 Allergies as of 1/11/2017  Review Complete On: 1/11/2017 By: Betina Carreno MD  
  
 Severity Noted Reaction Type Reactions Mirapex [Pramipexole]  05/16/2016    Drowsiness Morphine  03/23/2016    Rash Tramadol  05/10/2016    Other (comments)  
 headache Current Immunizations  Reviewed on 1/7/2017 Name Date Tdap 7/6/2016 Not reviewed this visit You Were Diagnosed With   
  
 Codes Comments Uncomplicated asthma, unspecified asthma severity    -  Primary ICD-10-CM: J45.909 ICD-9-CM: 493.90 Type 2 diabetes mellitus with hyperglycemia, without long-term current use of insulin (HCC)     ICD-10-CM: E11.65 ICD-9-CM: 250.00, 790.29   
 30 weeks gestation of pregnancy     ICD-10-CM: Z3A.30 ICD-9-CM: V22.2 Vitals BP Pulse Temp Resp Height(growth percentile) LMP  
 98/47 (BP 1 Location: Left arm, BP Patient Position: Sitting) 84 97.2 °F (36.2 °C) (Oral) 20 5' 5\" (1.651 m) 05/10/2016 SpO2 PF OB Status Smoking Status 100% 250 L/min Pregnant Former Smoker Vitals History Preferred Pharmacy Pharmacy Name Phone [address] [facility], [address] [place] [id number] [facility] [phone] Your Updated Medication List  
  
   
This list is accurate as of: 1/11/17 11:01 AM.  Always use your most recent med list.  
  
  
  
  
 * albuterol 90 mcg/actuation inhaler Commonly known as:  PROAIR HFA Take 2 Puffs by inhalation every four (4) hours as needed for Wheezing. * albuterol 2.5 mg /3 mL (0.083 %) nebulizer solution Commonly known as:  PROVENTIL VENTOLIN  
3 mL by Nebulization route every four (4) hours as needed for Wheezing. aspirin 81 mg chewable tablet Take 1 Tab by mouth daily. azithromycin 250 mg tablet Commonly known as:  [place] Medin Take 2 tabs today; then take 1 tab daily for 4 days Blood-Glucose Meter monitoring kit Use up to three times per days diphenoxylate-atropine 2.5-0.025 mg per tablet Commonly known as:  LOMOTIL Take 1 Tab by mouth four (4) times daily as needed for Diarrhea. Max Daily Amount: 4 Tabs. glucose blood VI test strips strip Commonly known as:  ASCENSIA AUTODISC VI, ONE TOUCH ULTRA TEST VI  
Use up to four times daily  
  
 glyBURIDE 1.25 mg tablet Commonly known as:  [place] Take 1/2 pill if you have an evening snack  
  
 guaiFENesin-codeine 100-10 mg/5 mL solution Commonly known as:  ROBITUSSIN AC Take 5 mL by mouth four (4) times daily as needed for Cough for up to 7 days. Max Daily Amount: 20 mL.  
  
 labetalol 100 mg tablet Commonly known as:  Gilma Kathryn Take 100 mg by mouth two (2) times a day. Lancets Misc Use up to three times per days;  
  
 metFORMIN 1,000 mg tablet Commonly known as:  GLUCOPHAGE Take 1 Tab by mouth two (2) times a day. PEN NEEDLE 31 gauge x 5/16\" Ndle Generic drug:  Insulin Needles (Disposable)  
  
 prenatal vitamin 27 mg iron- 800 mcg Tab tablet  
daily. SITagliptin 100 mg tablet Commonly known as:  Jeanine Burton Take 1 Tab by mouth daily. Appointment needed to refill this medication again. * Notice: This list has 2 medication(s) that are the same as other medications prescribed for you. Read the directions carefully, and ask your doctor or other care provider to review them with you. Prescriptions Printed Refills  
 guaiFENesin-codeine (ROBITUSSIN AC) 100-10 mg/5 mL solution 0 Sig: Take 5 mL by mouth four (4) times daily as needed for Cough for up to 7 days. Max Daily Amount: 20 mL. Class: Print Route: Oral  
  
Prescriptions Sent to Pharmacy Refills  
 metFORMIN (GLUCOPHAGE) 1,000 mg tablet 5 Sig: Take 1 Tab by mouth two (2) times a day. Class: Normal  
 Pharmacy: [address] [place] [address] Ph #: [phone] Route: Oral  
 azithromycin (ZITHROMAX Z-ALEX) 250 mg tablet 0 Sig: Take 2 tabs today; then take 1 tab daily for 4 days Class: Normal  
 Pharmacy: [address] [place] [address] Ph #: [phone]  
 albuterol (PROVENTIL VENTOLIN) 2.5 mg /3 mL (0.083 %) nebulizer solution 5 Sig: 3 mL by Nebulization route every four (4) hours as needed for Wheezing. Class: Normal  
 Pharmacy: [address] [place] [address] Ph #: [phone] Route: Nebulization We Performed the Following ALBUTEROL, INHAL. SOL., FDA-APPROVED FINAL, NON-COMPOUND UNIT DOSE, 1 MG [ [facility]] INHAL RX, AIRWAY OBST/DX SPUTUM INDUCT [id number] CPT(R)] [place] VITAL CAPACITY TEST [88013 CPT(R)] Follow-up Instructions Return in about 1 week (around 1/18/2017). Introducing [facility] SERVICES! Marilin Currie introduces [facility] patient portal. Now you can access parts of your medical record, email your doctor's office, and request medication refills online. 1. In your internet browser, go to [email or web address] 2. Click on the First Time User? Click Here link in the Sign In box. You will see the New Member Sign Up page. 3. Enter your "[employer]" Access Code exactly as it appears below. You will not need to use this code after youve completed the sign-up process. If you do not sign up before the expiration date, you must request a new code. · "[employer] Access Code: [email or web address] Expires: 2/9/2017  2:41 PM 
 
4. Enter the last four digits of your Social Security Number (xxxx) and Date of Birth (mm/dd/yyyy) as indicated and click Submit. You will be taken to the next sign-up page. 5. Create a "[employer]" ID. This will be your "[employer]" login ID and cannot be changed, so think of one that is secure and easy to remember. 6. Create a "[employer]" password. You can change your password at any time. 7. Enter your Password Reset Question and Answer. This can be used at a later time if you forget your password. 8. Enter your e-mail address. You will receive e-mail notification when new information is available in [address] 9. Click Sign Up. You can now view and download portions of your medical record. 10. Click the Download Summary menu link to download a portable copy of your medical information. If you have questions, please visit the Frequently Asked Questions section of the "[employer]" website. Remember, "[employer]" is NOT to be used for urgent needs. For medical emergencies, dial 911. Now available from your iPhone and Android! Please provide this summary of care documentation to your next provider. Your primary care clinician is listed as PROVIDER UNKNOWN. If you have any questions after today's visit, please call [phone].

## 2017-01-11 NOTE — PROGRESS NOTES
Patient saw Dr Wilman Patterson this morning for a routine pregnant visit - having breathing difficulty with asthma, chest is tight, cough, needs Albuterol treatment - peak flow best out of 3 prior to treatment at [address], LPN  2/16/8514  01:83 AM  Peak flow after albuterol treatment still at 250,best out of 3 attempts - patient reports feels a little better  Blake Villareal LPN  9/99/2588  45:93 AM

## 2017-01-13 NOTE — DISCHARGE SUMMARY
Antepartum Discharge Summary     Name: Arden Roldan MRN: [id number]  SSN: [id number]    YOB: 1985  Age: 33 y.o. Sex: female      Admit Date: 1/5/2017    Discharge Date: 1/13/2017     Admitting Physician: Makenna Hardin MD     Attending Physician:  No att. providers found     * Admission Diagnoses: 28 weeks GA, Epistac  28 weeks GA, Epistac  Bleeding nose    * Discharge Diagnoses:   Hospital Problems as of 1/9/2017  Date Reviewed: 12/7/2016          Codes Class Noted - Resolved POA    Bleeding nose ICD-10-CM: R04.0  ICD-9-CM: 784.7  1/7/2017 - Present Unknown           No results found for: [address], RUBELLAEXT, GRBSEXT, ABORHEXT, RUBELLAEXT, GRBSEXT   Immunization History   Administered Date(s) Administered    Influenza Vaccine 10/01/2016    Tdap 07/06/2016       * Discharge Condition: stable    * Procedures: none  * No surgery found *      [facility] Course:    - Diabetes -Type 1:                                     - Discharge home on subcutaneous insulin in divided doses. * Disposition: Home    Discharge Medications:   Discharge Medication List as of 1/9/2017  2:18 PM      CONTINUE these medications which have NOT CHANGED    Details   SITagliptin (JANUVIA) 100 mg tablet Take 1 Tab by mouth daily. Appointment needed to refill this medication again., Normal, Disp-30 Tab, R-5      glyBURIDE (DIABETA) 1.25 mg tablet Take 1/2 pill if you have an evening snack, Normal, Disp-30 Tab, R-3      labetalol (NORMODYNE) 100 mg tablet Take 100 mg by mouth two (2) times a day., Historical Med      PRENATAL VIT#96/FERROUS FUM/FA (PRENATAL VITAMIN) 27 mg iron- 800 mcg tab tablet daily. , Historical Med      aspirin 81 mg chewable tablet Take 1 Tab by mouth daily. , Normal, Disp-30 Tab, R-5      Lancets misc Use up to three times per days;, Normal, Disp-1 Each, R-11      glucose blood VI test strips (ASCENSIA AUTODISC VI, ONE TOUCH ULTRA TEST VI) strip Use up to four times daily, Normal, Disp-100 Strip, R-5 Blood-Glucose Meter monitoring kit Use up to three times per days, Normal, Disp-1 Kit, R-0      albuterol (PROAIR HFA) 90 mcg/actuation inhaler Take 2 Puffs by inhalation every four (4) hours as needed for Wheezing., Normal, Disp-1 Inhaler, R-5      PEN NEEDLE 31 X 5/16 \" ndle Historical Med, R-0      guaiFENesin-codeine (ROBITUSSIN AC) 100-10 mg/5 mL solution Take 5 mL by mouth four (4) times daily as needed for Cough for up to 7 days. Max Daily Amount: 20 mL., Print, Disp-120 mL, R-0      azithromycin (ZITHROMAX Z-ALEX) 250 mg tablet Take 2 tabs today; then take 1 tab daily for 4 days, Print, Disp-6 Tab, R-0      diphenoxylate-atropine (LOMOTIL) 2.5-0.025 mg per tablet Take 1 Tab by mouth four (4) times daily as needed for Diarrhea. Max Daily Amount: 4 Tabs., Print, Disp-20 Tab, R-0      metFORMIN (GLUCOPHAGE) 1,000 mg tablet Take 1 Tab by mouth two (2) times a day., Normal, Disp-60 Tab, R-5             * Follow-up Care/Patient Instructions:   Activity: Activity as tolerated  Diet: Regular Diet  Wound Care: None needed    Follow-up Information     Follow up With Details Comments Contact Info    Provider Unknown   Patient not available to ask      Tatyana Reed MD   [facility]  [phone]             Signed By:  Kimmy Willingham MD     January 13, 2017

## 2017-01-18 ENCOUNTER — OFFICE VISIT (OUTPATIENT)
Dept: INTERNAL MEDICINE CLINIC | Age: 32
End: 2017-01-18

## 2017-01-18 VITALS
BODY MASS INDEX: 41.82 KG/M2 | DIASTOLIC BLOOD PRESSURE: 71 MMHG | HEART RATE: 96 BPM | WEIGHT: 251 LBS | HEIGHT: 65 IN | OXYGEN SATURATION: 99 % | SYSTOLIC BLOOD PRESSURE: 114 MMHG | RESPIRATION RATE: 18 BRPM | TEMPERATURE: 96.3 F

## 2017-01-18 DIAGNOSIS — E11.65 TYPE 2 DIABETES MELLITUS WITH HYPERGLYCEMIA, WITHOUT LONG-TERM CURRENT USE OF INSULIN (HCC): ICD-10-CM

## 2017-01-18 DIAGNOSIS — Z3A.30 30 WEEKS GESTATION OF PREGNANCY: ICD-10-CM

## 2017-01-18 DIAGNOSIS — R19.7 DIARRHEA, UNSPECIFIED TYPE: Primary | ICD-10-CM

## 2017-01-18 DIAGNOSIS — J45.40 MODERATE PERSISTENT ASTHMA WITHOUT COMPLICATION: ICD-10-CM

## 2017-01-18 RX ORDER — METFORMIN HYDROCHLORIDE 1000 MG/1
500 TABLET ORAL 2 TIMES DAILY
Qty: 60 TAB | Refills: 5 | Status: SHIPPED | OUTPATIENT
Start: 2017-01-18 | End: 2017-04-05 | Stop reason: SDUPTHER

## 2017-01-18 NOTE — PROGRESS NOTES
Chief Complaint   Patient presents with    Asthma     follow up     I have reviewed the patient's medical history in detail and updated the computerized patient record. Health Maintenance reviewed. 1. Have you been to the ER, urgent care clinic since your last visit? Hospitalized since your last visit?no    2. Have you seen or consulted any other health care providers outside of the [address] [place] [address] since your last visit? Include any pap smears or colon screening.  no

## 2017-01-18 NOTE — PROGRESS NOTES
HISTORY OF PRESENT ILLNESS  Roseanne Thompson is a 33 y.o. female. Asthma   The history is provided by the patient. This is a chronic problem. The current episode started more than 1 week ago. The problem has been gradually improving. Pertinent negatives include no chest pain, no abdominal pain and no shortness of breath. Treatments tried: zithro steroids. The treatment provided moderate relief. she is 30 weeks pregnant. No issues with pregnancy. No pelvic pain or discharge or bleeding. Reports baby is kicking as usual.  Has diabetes checks her blood sugars on a regular basis. Takes diabetes medications. No hypoglycemic episodes. Needs her diabetes medications refilled. Since taking antibiotic reports loose stools. Has not really started on the Lomotil. Minimal complaints of abdominal pain. No bleeding. Current Outpatient Prescriptions   Medication Sig Dispense Refill    metFORMIN (GLUCOPHAGE) 1,000 mg tablet Take 0.5 Tabs by mouth two (2) times a day. 60 Tab 5    SITagliptin (JANUVIA) 100 mg tablet Take 1 Tab by mouth daily. 30 Tab 5    albuterol (PROVENTIL VENTOLIN) 2.5 mg /3 mL (0.083 %) nebulizer solution 3 mL by Nebulization route every four (4) hours as needed for Wheezing. 100 Each 5    diphenoxylate-atropine (LOMOTIL) 2.5-0.025 mg per tablet Take 1 Tab by mouth four (4) times daily as needed for Diarrhea. Max Daily Amount: 4 Tabs. 20 Tab 0    glyBURIDE (DIABETA) 1.25 mg tablet Take 1/2 pill if you have an evening snack 30 Tab 3    labetalol (NORMODYNE) 100 mg tablet Take 100 mg by mouth two (2) times a day.  PRENATAL VIT#96/FERROUS FUM/FA (PRENATAL VITAMIN) 27 mg iron- 800 mcg tab tablet daily.  aspirin 81 mg chewable tablet Take 1 Tab by mouth daily.  30 Tab 5    Lancets misc Use up to three times per days; 1 Each 11    glucose blood VI test strips (ASCENSIA AUTODISC VI, ONE TOUCH ULTRA TEST VI) strip Use up to four times daily 100 Strip 5    Blood-Glucose Meter monitoring kit Use up to three times per days 1 Kit 0    albuterol (PROAIR HFA) 90 mcg/actuation inhaler Take 2 Puffs by inhalation every four (4) hours as needed for Wheezing. 1 Inhaler 5    PEN NEEDLE 31 X 5/16 \" ndle   0     Allergies   Allergen Reactions    Mirapex [Pramipexole] Drowsiness    Morphine Rash    Tramadol Other (comments)     headache         Review of Systems   Respiratory: Negative for shortness of breath. Cardiovascular: Negative for chest pain. Gastrointestinal: Positive for diarrhea. Negative for abdominal pain, blood in stool, constipation, nausea and vomiting. Genitourinary: Negative for dysuria. Physical Exam  Visit Vitals    /71 (BP 1 Location: Left arm, BP Patient Position: Sitting)    Pulse 96    Temp 96.3 °F (35.7 °C) (Oral)    Resp 18    Ht 5' 5\" (1.651 m)    Wt 251 lb (113.9 kg)    LMP 05/10/2016    SpO2 99%    BMI 41.77 kg/m2     WD WN female NAD  Heart RRR without murmers clicks or rubs  Lungs CTA  Abdo soft nontender, gravid uterus  Ext no edema    ASSESSMENT and PLAN  Encounter Diagnoses   Name Primary?  Diarrhea, unspecified type Yes    Moderate persistent asthma without complication     Type 2 diabetes mellitus with hyperglycemia, without long-term current use of insulin (HCC)     30 weeks gestation of pregnancy      Orders Placed This Encounter    C DIFFICILE TOXIN A & B BY EIA    metFORMIN (GLUCOPHAGE) 1,000 mg tablet    SITagliptin (JANUVIA) 100 mg tablet     Follow-up Disposition:  Return in about 10 days (around 1/28/2017) for routine follow up.

## 2017-01-18 NOTE — MR AVS SNAPSHOT
Visit Information Date & Time Provider Department Dept. Phone Encounter #  
 1/18/2017  9:50 AM MD Samantha Owen Dr [id number] Follow-up Instructions Return in about 10 days (around 1/28/2017) for routine follow up. Your Appointments 1/25/2017 12:30 PM  
Follow Up with MD Eloise Sheth Diabetes and Endocrinology [facility]) [address] [phone] [facility] Upcoming Health Maintenance Date Due Pneumococcal 19-64 Medium Risk (1 of 1 - PPSV23) 10/14/2002 LIPID PANEL Q1 4/10/2016 PAP AKA CERVICAL CYTOLOGY 5/6/2017 HEMOGLOBIN A1C Q6M 6/7/2017 MICROALBUMIN Q1 6/21/2017 EYE EXAM RETINAL OR DILATED Q1 7/18/2017 FOOT EXAM Q1 11/11/2017 DTaP/Tdap/Td series (2 - Td) 7/6/2026 Allergies as of 1/18/2017  Review Complete On: 1/18/2017 By: Camden Solis MD  
  
 Severity Noted Reaction Type Reactions Mirapex [Pramipexole]  05/16/2016    Drowsiness Morphine  03/23/2016    Rash Tramadol  05/10/2016    Other (comments)  
 headache Current Immunizations  Reviewed on 1/7/2017 Name Date Influenza Vaccine 10/1/2016 Tdap 7/6/2016 Not reviewed this visit You Were Diagnosed With   
  
 Codes Comments Diarrhea, unspecified type    -  Primary ICD-10-CM: R19.7 ICD-9-CM: 787.91 Moderate persistent asthma without complication     JQR-20-GC: J45.40 ICD-9-CM: 493.90 Type 2 diabetes mellitus with hyperglycemia, without long-term current use of insulin (HCC)     ICD-10-CM: E11.65 ICD-9-CM: 250.00, 790.29   
 30 weeks gestation of pregnancy     ICD-10-CM: Z3A.30 ICD-9-CM: V22.2 Vitals BP Pulse Temp Resp Height(growth percentile) Weight(growth percentile)  114/71 (BP 1 Location: Left arm, BP Patient Position: Sitting) 96 96.3 °F (35.7 °C) (Oral) 18 5' 5\" (1.651 m) 251 lb (113.9 kg) LMP SpO2 BMI OB Status Smoking Status 05/10/2016 99% 41.77 kg/m2 Pregnant Former Smoker BMI and BSA Data Body Mass Index Body Surface Area 41.77 kg/m 2 2.29 m 2 Preferred Pharmacy Pharmacy Name Phone [address], [address] [place] [address] Road [phone] Your Updated Medication List  
  
   
This list is accurate as of: 1/18/17 10:22 AM.  Always use your most recent med list.  
  
  
  
  
 * albuterol 90 mcg/actuation inhaler Commonly known as:  PROAIR HFA Take 2 Puffs by inhalation every four (4) hours as needed for Wheezing. * albuterol 2.5 mg /3 mL (0.083 %) nebulizer solution Commonly known as:  PROVENTIL VENTOLIN  
3 mL by Nebulization route every four (4) hours as needed for Wheezing. aspirin 81 mg chewable tablet Take 1 Tab by mouth daily. Blood-Glucose Meter monitoring kit Use up to three times per days diphenoxylate-atropine 2.5-0.025 mg per tablet Commonly known as:  LOMOTIL Take 1 Tab by mouth four (4) times daily as needed for Diarrhea. Max Daily Amount: 4 Tabs. glucose blood VI test strips strip Commonly known as:  ASCENSIA AUTODISC VI, ONE TOUCH ULTRA TEST VI  
Use up to four times daily  
  
 glyBURIDE 1.25 mg tablet Commonly known as:  Dotty Cork Take 1/2 pill if you have an evening snack  
  
 labetalol 100 mg tablet Commonly known as:  Audelia Denton Take 100 mg by mouth two (2) times a day. Lancets Misc Use up to three times per days;  
  
 metFORMIN 1,000 mg tablet Commonly known as:  GLUCOPHAGE Take 0.5 Tabs by mouth two (2) times a day. PEN NEEDLE 31 gauge x 5/16\" Ndle Generic drug:  Insulin Needles (Disposable)  
  
 prenatal vitamin 27 mg iron- 800 mcg Tab tablet  
daily. SITagliptin 100 mg tablet Commonly known as:  Chris Oats Take 1 Tab by mouth daily. * Notice: This list has 2 medication(s) that are the same as other medications prescribed for you. Read the directions carefully, and ask your doctor or other care provider to review them with you. Prescriptions Sent to Pharmacy Refills  
 metFORMIN (GLUCOPHAGE) 1,000 mg tablet 5 Sig: Take 0.5 Tabs by mouth two (2) times a day. Class: Normal  
 Pharmacy: [address] [place] [address] Ph #: [phone] Route: Oral  
 SITagliptin (JANUVIA) 100 mg tablet 5 Sig: Take 1 Tab by mouth daily. Class: Normal  
 Pharmacy: [address] [place] [address] Ph #: [phone] Route: Oral  
  
We Performed the Following C DIFFICILE TOXIN A & B BY EIA [48294 CPT(R)] Follow-up Instructions Return in about 10 days (around 1/28/2017) for routine follow up. Introducing [facility] SERVICES! Sandhya Escobedo introduces [email or web address] patient portal. Now you can access parts of your medical record, email your doctor's office, and request medication refills online. 1. In your internet browser, go to [email or web address] 2. Click on the First Time User? Click Here link in the Sign In box. You will see the New Member Sign Up page. 3. Enter your [employer] Access Code exactly as it appears below. You will not need to use this code after youve completed the sign-up process. If you do not sign up before the expiration date, you must request a new code. · [email or web address] Access Code: [email or web address] Expires: 2/9/2017  2:41 PM 
 
4. Enter the last four digits of your Social Security Number (xxxx) and Date of Birth (mm/dd/yyyy) as indicated and click Submit. You will be taken to the next sign-up page. 5. Create a Hiptypet ID. This will be your Fruit[employer][email or web address] login ID and cannot be changed, so think of one that is secure and easy to remember. 6. Create a Hiptypet password. You can change your password at any time. 7. Enter your Password Reset Question and Answer. This can be used at a later time if you forget your password. 8. Enter your e-mail address. You will receive e-mail notification when new information is available in [address] 9. Click Sign Up. You can now view and download portions of your medical record. 10. Click the Download Summary menu link to download a portable copy of your medical information. If you have questions, please visit the Frequently Asked Questions section of the [employer] website. Remember, [employer] is NOT to be used for urgent needs. For medical emergencies, dial 911. Now available from your iPhone and Android! Please provide this summary of care documentation to your next provider. Your primary care clinician is listed as PROVIDER UNKNOWN. If you have any questions after today's visit, please call [phone].

## 2017-01-26 LAB — C DIFF TOX A+B STL QL IA: NORMAL

## 2017-01-27 NOTE — PROGRESS NOTES
Send the following message:   Your stool study was cancelled, please re-schedule this if you are still having diarrhea

## 2017-01-30 ENCOUNTER — HOSPITAL ENCOUNTER (OUTPATIENT)
Age: 32
Discharge: HOME | End: 2017-01-30
Attending: OBSTETRICS & GYNECOLOGY | Admitting: OBSTETRICS & GYNECOLOGY
Payer: MEDICAID

## 2017-01-30 VITALS
RESPIRATION RATE: 18 BRPM | DIASTOLIC BLOOD PRESSURE: 62 MMHG | HEART RATE: 89 BPM | OXYGEN SATURATION: 98 % | WEIGHT: 256 LBS | HEIGHT: 65 IN | BODY MASS INDEX: 42.65 KG/M2 | SYSTOLIC BLOOD PRESSURE: 113 MMHG | TEMPERATURE: 98.1 F

## 2017-01-30 PROBLEM — R19.7 DIARRHEA, UNSPECIFIED: Status: ACTIVE | Noted: 2017-01-30

## 2017-01-30 PROBLEM — O99.891 BACK PAIN IN PREGNANCY: Status: ACTIVE | Noted: 2017-01-30

## 2017-01-30 PROBLEM — M54.9 BACK PAIN IN PREGNANCY: Status: ACTIVE | Noted: 2017-01-30

## 2017-01-30 LAB
ALBUMIN SERPL BCP-MCNC: 2.5 G/DL (ref 3.5–5)
ALBUMIN/GLOB SERPL: 0.7 {RATIO} (ref 1.1–2.2)
ALP SERPL-CCNC: 92 U/L (ref 45–117)
ALT SERPL-CCNC: 60 U/L (ref 12–78)
ANION GAP BLD CALC-SCNC: 11 MMOL/L (ref 5–15)
APPEARANCE UR: CLEAR
AST SERPL W P-5'-P-CCNC: 31 U/L (ref 15–37)
BACTERIA URNS QL MICRO: ABNORMAL /HPF
BASOPHILS # BLD AUTO: 0 K/UL (ref 0–0.1)
BASOPHILS # BLD: 0 % (ref 0–1)
BILIRUB SERPL-MCNC: 0.4 MG/DL (ref 0.2–1)
BILIRUB UR QL: NEGATIVE
BUN SERPL-MCNC: 6 MG/DL (ref 6–20)
BUN/CREAT SERPL: 14 (ref 12–20)
CALCIUM SERPL-MCNC: 8.8 MG/DL (ref 8.5–10.1)
CHLORIDE SERPL-SCNC: 108 MMOL/L (ref 97–108)
CO2 SERPL-SCNC: 23 MMOL/L (ref 21–32)
COLOR UR: ABNORMAL
CREAT SERPL-MCNC: 0.44 MG/DL (ref 0.55–1.02)
EOSINOPHIL # BLD: 0.2 K/UL (ref 0–0.4)
EOSINOPHIL NFR BLD: 1 % (ref 0–7)
EPITH CASTS URNS QL MICRO: ABNORMAL /LPF
ERYTHROCYTE [DISTWIDTH] IN BLOOD BY AUTOMATED COUNT: 14.6 % (ref 11.5–14.5)
GLOBULIN SER CALC-MCNC: 3.6 G/DL (ref 2–4)
GLUCOSE BLD STRIP.AUTO-MCNC: 125 MG/DL (ref 65–100)
GLUCOSE SERPL-MCNC: 121 MG/DL (ref 65–100)
GLUCOSE UR STRIP.AUTO-MCNC: NEGATIVE MG/DL
HCT VFR BLD AUTO: 28.6 % (ref 35–47)
HGB BLD-MCNC: 8.9 G/DL (ref 11.5–16)
HGB UR QL STRIP: NEGATIVE
HYALINE CASTS URNS QL MICRO: ABNORMAL /LPF (ref 0–5)
KETONES UR QL STRIP.AUTO: NEGATIVE MG/DL
LEUKOCYTE ESTERASE UR QL STRIP.AUTO: NEGATIVE
LYMPHOCYTES # BLD AUTO: 23 % (ref 12–49)
LYMPHOCYTES # BLD: 3 K/UL (ref 0.8–3.5)
MCH RBC QN AUTO: 24 PG (ref 26–34)
MCHC RBC AUTO-ENTMCNC: 31.1 G/DL (ref 30–36.5)
MCV RBC AUTO: 77.1 FL (ref 80–99)
MONOCYTES # BLD: 0.8 K/UL (ref 0–1)
MONOCYTES NFR BLD AUTO: 6 % (ref 5–13)
NEUTS SEG # BLD: 9.2 K/UL (ref 1.8–8)
NEUTS SEG NFR BLD AUTO: 70 % (ref 32–75)
NITRITE UR QL STRIP.AUTO: NEGATIVE
PH UR STRIP: 6.5 [PH] (ref 5–8)
PLATELET # BLD AUTO: 322 K/UL (ref 150–400)
POTASSIUM SERPL-SCNC: 4 MMOL/L (ref 3.5–5.1)
PROT SERPL-MCNC: 6.1 G/DL (ref 6.4–8.2)
PROT UR STRIP-MCNC: NEGATIVE MG/DL
RBC # BLD AUTO: 3.71 M/UL (ref 3.8–5.2)
RBC #/AREA URNS HPF: ABNORMAL /HPF (ref 0–5)
SERVICE CMNT-IMP: ABNORMAL
SODIUM SERPL-SCNC: 142 MMOL/L (ref 136–145)
SP GR UR REFRACTOMETRY: 1.01 (ref 1–1.03)
UA: UC IF INDICATED,UAUC: ABNORMAL
UROBILINOGEN UR QL STRIP.AUTO: 1 EU/DL (ref 0.2–1)
WBC # BLD AUTO: 13.1 K/UL (ref 3.6–11)
WBC URNS QL MICRO: ABNORMAL /HPF (ref 0–4)

## 2017-01-30 PROCEDURE — 85025 COMPLETE CBC W/AUTO DIFF WBC: CPT | Performed by: OBSTETRICS & GYNECOLOGY

## 2017-01-30 PROCEDURE — 36415 COLL VENOUS BLD VENIPUNCTURE: CPT | Performed by: OBSTETRICS & GYNECOLOGY

## 2017-01-30 PROCEDURE — 80053 COMPREHEN METABOLIC PANEL: CPT | Performed by: OBSTETRICS & GYNECOLOGY

## 2017-01-30 PROCEDURE — 59025 FETAL NON-STRESS TEST: CPT

## 2017-01-30 PROCEDURE — 99282 EMERGENCY DEPT VISIT SF MDM: CPT

## 2017-01-30 PROCEDURE — 87086 URINE CULTURE/COLONY COUNT: CPT | Performed by: OBSTETRICS & GYNECOLOGY

## 2017-01-30 PROCEDURE — 82962 GLUCOSE BLOOD TEST: CPT

## 2017-01-30 PROCEDURE — 81001 URINALYSIS AUTO W/SCOPE: CPT | Performed by: OBSTETRICS & GYNECOLOGY

## 2017-01-30 NOTE — H&P
History & Physical    Name: Yolanda Boston MRN: [id number]  SSN: [id number]    YOB: 1985  Age: 33 y.o. Sex: female      Subjective:     Reason for Admission:  Pregnancy with back pain and diarrhea. History of Present Illness: Yolanda Boston is a 33 y.o.  female with an estimated gestational age of 29w1d with Estimated Date of Delivery: 3/26/17. Patient complains of awoke yesterday with sharp mid back pain, /10, unrelated to position or activity. Denies any unusual activity; sleeps on soft mattress. No urinary symptoms but has had diarrhea for past 2 days. No vaginal bleeding or unusual discharge; baby has been active. Patient has diabetes, currently being seen by Dr. Duwayne Boast; on metformin and Januvia. Also, has chronic hypertension, on labetalol.       OB History    Para Term  AB SAB TAB Ectopic Multiple Living   1               # Outcome Date GA Lbr Byron/2nd Weight Sex Delivery Anes PTL Lv   1 Current                 Past Medical History   Diagnosis Date    Asthma     Diabetes ([address]     Hypertension      Past Surgical History   Procedure Laterality Date    Hx tympanostomy       Social History     Occupational History    disabled      Social History Main Topics    Smoking status: Former Smoker     Packs/day: 0.10     Types: Cigarettes     Start date: 3/23/2004     Quit date: 2017    Smokeless tobacco: Never Used    Alcohol use No    Drug use: No    Sexual activity: Yes     Partners: Male     Family History   Problem Relation Age of Onset    Diabetes Father     Heart Disease Father      CAD    Hypertension Father     Diabetes Mother     Hypertension Mother     Liver Disease Mother     Deep Vein Thrombosis Mother     Pulmonary Embolism Mother     Hypertension Sister     Deep Vein Thrombosis Sister     Pulmonary Embolism Sister     Diabetes Maternal Uncle     Cancer Maternal Grandmother      Lung Cancer       Allergies   Allergen Reactions  Mirapex [Pramipexole] Drowsiness    Morphine Rash    Tramadol Other (comments)     headache     Prior to Admission medications    Medication Sig Start Date End Date Taking? Authorizing Provider   metFORMIN (GLUCOPHAGE) 1,000 mg tablet Take 0.5 Tabs by mouth two (2) times a day. 1/18/17  Yes Erich Mcfadden MD   SITagliptin (JANUVIA) 100 mg tablet Take 1 Tab by mouth daily. 1/18/17  Yes Erich Mcfadden MD   albuterol (PROVENTIL VENTOLIN) 2.5 mg /3 mL (0.083 %) nebulizer solution 3 mL by Nebulization route every four (4) hours as needed for Wheezing. 1/11/17  Yes Erich Mcfadden MD   glyBURIDE (DIABETA) 1.25 mg tablet Take 1/2 pill if you have an evening snack 12/7/16  Yes Kurt Leon MD   labetalol (NORMODYNE) 100 mg tablet Take 100 mg by mouth two (2) times a day. 8/1/16  Yes Historical Provider   PRENATAL VIT#96/FERROUS FUM/FA (PRENATAL VITAMIN) 27 mg iron- 800 mcg tab tablet daily. 8/1/16  Yes Historical Provider   aspirin 81 mg chewable tablet Take 1 Tab by mouth daily. 8/8/16  Yes Reva Sieving, NP   Lancets misc Use up to three times per days; 7/6/16  Yes Erich Mcfadden MD   glucose blood VI test strips (ASCENSIA AUTODISC VI, ONE TOUCH ULTRA TEST VI) strip Use up to four times daily 7/6/16  Yes Erich Mcfadden MD   Blood-Glucose Meter monitoring kit Use up to three times per days 6/25/16  Yes Erich Mcfadden MD   albuterol [facility] HFA) 90 mcg/actuation inhaler Take 2 Puffs by inhalation every four (4) hours as needed for Wheezing. 3/23/16  Yes Erich Mcfadden MD   PEN NEEDLE 31 X 5/16 \" ndle  5/12/15  Yes Sherwin Cesar MD   diphenoxylate-atropine (LOMOTIL) 2.5-0.025 mg per tablet Take 1 Tab by mouth four (4) times daily as needed for Diarrhea. Max Daily Amount: 4 Tabs. 12/24/16   Jayshree Patiño, DO        Review of Systems   Constitutional: Negative. HENT: Negative. Eyes: Negative. Respiratory: Negative. Cardiovascular: Negative. Gastrointestinal: Positive for diarrhea. Endocrine: Negative.     Genitourinary: Negative. Musculoskeletal: Negative. Skin: Negative. Allergic/Immunologic: Negative. Neurological: Negative. Hematological: Negative. Objective:     Vitals:    Vitals:    17 0211 17 0217 17 0227 17 0230   BP:       Pulse:       Resp:    18   Temp:       SpO2: 96% 97% 96%    Weight:       Height:          Temp (24hrs), Av.1 °F (36.7 °C), Min:98.1 °F (36.7 °C), Max:98.1 °F (36.7 °C)    BP  Min: 103/52  Max: 137/69     Physical Exam   Nursing note and vitals reviewed. Constitutional: She is oriented to person, place, and time. She appears well-developed and well-nourished. Obese: BMI 42.6   HENT:   Head: Normocephalic. Missing teeth. Eyes: Conjunctivae are normal. Pupils are equal, round, and reactive to light. Neck: Normal range of motion. Cardiovascular: Normal rate and regular rhythm. Pulmonary/Chest: Effort normal.   Abdominal: Soft. There is tenderness. There is no rebound and no guarding. Gravid uterus. FH 32 cm. No contractions. , no decels. Upper abdomen is distended, slightly tender but no rebound. Genitourinary:   Genitourinary Comments: External genitalia:  Normal.  Vagina:  No unusual discharge, no bleeding. Cervix:  Very posterior, closed, uneffaced, with no presenting fetal part in pelvis. Musculoskeletal: Normal range of motion. Neurological: She is alert and oriented to person, place, and time. Skin: Skin is warm and dry. Many tattoos. Psychiatric: She has a normal mood and affect. Her behavior is normal.       Cervical Exam: Closed/Thick/High  Uterine Activity: None  Membranes: Intact  Fetal Heart Rate: 155, no decelerations.        Labs:   Recent Results (from the past 24 hour(s))   URINALYSIS W/ REFLEX CULTURE    Collection Time: 17 12:48 AM   Result Value Ref Range    Color YELLOW/STRAW      Appearance CLEAR CLEAR      Specific gravity 1.008 1.003 - 1.030      pH (UA) 6.5 5.0 - 8.0      Protein NEGATIVE NEG mg/dL    Glucose NEGATIVE  NEG mg/dL    Ketone NEGATIVE  NEG mg/dL    Bilirubin NEGATIVE  NEG      Blood NEGATIVE  NEG      Urobilinogen 1.0 0.2 - 1.0 EU/dL    Nitrites NEGATIVE  NEG      Leukocyte Esterase NEGATIVE  NEG      WBC 0-4 0 - 4 /hpf    RBC 0-5 0 - 5 /hpf    Epithelial cells FEW FEW /lpf    Bacteria 1+ (A) NEG /hpf    UA:UC IF INDICATED URINE CULTURE ORDERED (A) CNI      Hyaline Cast 0-2 0 - 5 /lpf       Assessment and Plan: Active Problems:    * No active hospital problems. *     32-1/7 week pregnancy with back pain (now 4/7) and diarrhea: U/A is normal.    PLAN:  CBC, CMP, and FSBS now.       Signed By:  Karen Haddad MD     January 30, 2017

## 2017-01-30 NOTE — DISCHARGE INSTRUCTIONS
Emily Ryan Contractions: Care Instructions  Your Care Instructions  Sergei Mcgarry contractions prepare your uterus for labor. Think of them as a \"warm-up\" exercise that your body does. You may begin to feel them between the 28th and 30th weeks of your pregnancy. But they start as early as the 20th week. Sergei Mcgarry contractions usually occur more often during the ninth month. They may go away when you are active and return when you rest. These contractions are like mild contractions of true labor, but they occur less often. (You feel fewer than 8 in an hour.) They don't cause your cervix to open. It may be hard for you to tell the difference between Emily Ryan contractions and true labor, especially in your first pregnancy. Follow-up care is a key part of your treatment and safety. Be sure to make and go to all appointments, and call your doctor if you are having problems. It's also a good idea to know your test results and keep a list of the medicines you take. How can you care for yourself at home? · Try a warm bath to help relieve muscle tension and reduce pain. · Change positions every 30 minutes. Take breaks if you must sit for a long time. Get up and walk around. · Drink plenty of water, enough so that your urine is light yellow or clear like water. · Taking short walks may help you feel better. Your doctor needs to check any contractions that are getting stronger or closer together. Where can you learn more? Go to http://sheree-frank.info/. Enter [phone] in the search box to learn more about \"[place] Mcgarry Contractions: Care Instructions. \"  Current as of: May 30, 2016  Content Version: 11.1  © 3743-6071 [employer]. Care instructions adapted under license by [employer] (which disclaims liability or warranty for this information).  If you have questions about a medical condition or this instruction, always ask your healthcare professional. [employer] disclaims any warranty or liability for your use of this information. Keep your doctor's appointment. Drink more water.

## 2017-01-30 NOTE — IP AVS SNAPSHOT
[address] [facility] 
[phone] Patient: Tano Dobbins MRN: [id number] :1985 You are allergic to the following Allergen Reactions Mirapex (Pramipexole) Drowsiness Morphine Rash Tramadol Other (comments)  
 headache Recent Documentation Height Weight BMI OB Status Smoking Status 1.651 m 116.1 kg 42.6 kg/m2 Pregnant Former Smoker Unresulted Labs Order Current Status CULTURE, URINE In process SAMPLE TO BLOOD BANK In process Emergency Contacts Name Discharge Info Relation Home Work Mobile Scooter Warner  Other Relative [6] [phone] About your hospitalization You were admitted on:  2017 You last received care in the:  [facility] 3 LD TRIAGE You were discharged on:  2017 Unit phone number:  [phone] Why you were hospitalized Your primary diagnosis was:  Not on File Providers Seen During Your Hospitalizations Provider Role Specialty Primary office phone Ines Negrete MD Attending Provider Obstetrics & Gynecology [phone] Your Primary Care Physician (PCP) Primary Care Physician Office Phone Office Fax Aleyda Clark [phone] Follow-up Information None Your Appointments 2017  8:50 AM EST  
ROUTINE CARE with Leeann Munson MD  
[place] [address] ([facility]) [address] [place] [address] [phone] [facility]  
[phone]   9:30 AM EST Follow Up with Yung Hutson MD  
[facility] [address] [address] [phone] Current Discharge Medication List  
  
ASK your doctor about these medications Dose & Instructions Dispensing Information Comments Morning Noon Evening Bedtime * albuterol 90 mcg/actuation inhaler Commonly known as:  PROAIR HFA Your next dose is: Today, Tomorrow Other:  _________ Dose:  2 Puff Take 2 Puffs by inhalation every four (4) hours as needed for Wheezing. Quantity:  1 Inhaler Refills:  5  
     
   
   
   
  
 * albuterol 2.5 mg /3 mL (0.083 %) nebulizer solution Commonly known as:  PROVENTIL VENTOLIN Your next dose is: Today, Tomorrow Other:  _________ Dose:  2.5 mg  
3 mL by Nebulization route every four (4) hours as needed for Wheezing. Quantity:  100 Each Refills:  5  
     
   
   
   
  
 aspirin 81 mg chewable tablet Your next dose is: Today, Tomorrow Other:  _________ Dose:  81 mg Take 1 Tab by mouth daily. Quantity:  30 Tab Refills:  5 Blood-Glucose Meter monitoring kit Your next dose is: Today, Tomorrow Other:  _________ Use up to three times per days Quantity:  1 Kit Refills:  0  
     
   
   
   
  
 diphenoxylate-atropine 2.5-0.025 mg per tablet Commonly known as:  LOMOTIL Your next dose is: Today, Tomorrow Other:  _________ Dose:  1 Tab Take 1 Tab by mouth four (4) times daily as needed for Diarrhea. Max Daily Amount: 4 Tabs. Quantity:  20 Tab Refills:  0  
     
   
   
   
  
 glucose blood VI test strips strip Commonly known as:  ASCENSIA AUTODISC VI, ONE TOUCH ULTRA TEST VI Your next dose is: Today, Tomorrow Other:  _________ Use up to four times daily Quantity:  100 Strip Refills:  5  
     
   
   
   
  
 glyBURIDE 1.25 mg tablet Commonly known as:  Redd Smith Your next dose is: Today, Tomorrow Other:  _________ Take 1/2 pill if you have an evening snack Quantity:  30 Tab Refills:  3 labetalol 100 mg tablet Commonly known as:  Littie Ates Your next dose is: Today, Tomorrow Other:  _________ Dose:  100 mg Take 100 mg by mouth two (2) times a day. Refills:  0 Lancets Misc Your next dose is: Today, Tomorrow Other:  _________ Use up to three times per days;  
 Quantity:  1 Each Refills:  11  
     
   
   
   
  
 metFORMIN 1,000 mg tablet Commonly known as:  GLUCOPHAGE Your next dose is: Today, Tomorrow Other:  _________ Dose:  500 mg Take 0.5 Tabs by mouth two (2) times a day. Quantity:  60 Tab Refills:  5 PEN NEEDLE 31 gauge x 5/16\" Ndle Generic drug:  Insulin Needles (Disposable) Your next dose is: Today, Tomorrow Other:  _________ Refills:  0  
     
   
   
   
  
 prenatal vitamin 27 mg iron- 800 mcg Tab tablet Your next dose is: Today, Tomorrow Other:  _________  
   
   
 daily. Refills:  0 SITagliptin 100 mg tablet Commonly known as:  Aissatou Homans Your next dose is: Today, Tomorrow Other:  _________ Dose:  100 mg Take 1 Tab by mouth daily. Quantity:  30 Tab Refills:  5  
     
   
   
   
  
 * Notice: This list has 2 medication(s) that are the same as other medications prescribed for you. Read the directions carefully, and ask your doctor or other care provider to review them with you. Discharge Instructions Kyra Powell Contractions: Care Instructions Your Care Instructions Sergei Mcgarry contractions prepare your uterus for labor. Think of them as a \"warm-up\" exercise that your body does. You may begin to feel them between the 28th and 30th weeks of your pregnancy. But they start as early as the 20th week.  
[place] Mcgarry contractions usually occur more often during the ninth month. They may go away when you are active and return when you rest. These contractions are like mild contractions of true labor, but they occur less often. (You feel fewer than 8 in an hour.) They don't cause your cervix to open. It may be hard for you to tell the difference between Ashanti Desean contractions and true labor, especially in your first pregnancy. Follow-up care is a key part of your treatment and safety. Be sure to make and go to all appointments, and call your doctor if you are having problems. It's also a good idea to know your test results and keep a list of the medicines you take. How can you care for yourself at home? · Try a warm bath to help relieve muscle tension and reduce pain. · Change positions every 30 minutes. Take breaks if you must sit for a long time. Get up and walk around. · Drink plenty of water, enough so that your urine is light yellow or clear like water. · Taking short walks may help you feel better. Your doctor needs to check any contractions that are getting stronger or closer together. Where can you learn more? Go to http://sheree-frank.info/. Enter [id number] in the search box to learn more about \"Sergei Mcgarry Contractions: Care Instructions. \" Current as of: May 30, 2016 Content Version: 11.1 © 0775-0182 [employer]. Care instructions adapted under license by [employer] (which disclaims liability or warranty for this information). If you have questions about a medical condition or this instruction, always ask your healthcare professional. [place] [address] any warranty or liability for your use of this information. Keep your doctor's appointment. Drink more water. Discharge Orders None Introducing [facility]! [facility] introduces SMART patient portal. Now you can access parts of your medical record, email your doctor's office, and request medication refills online. 1. In your internet browser, go to [email or web address] 2. Click on the First Time User? Click Here link in the Sign In box. You will see the New Member Sign Up page. 3. Enter your [employer] Access Code exactly as it appears below. You will not need to use this code after youve completed the sign-up process. If you do not sign up before the expiration date, you must request a new code. · [employer] Access Code: [email or web address] Expires: 2/9/2017  2:41 PM 
 
4. Enter the last four digits of your Social Security Number (xxxx) and Date of Birth (mm/dd/yyyy) as indicated and click Submit. You will be taken to the next sign-up page. 5. Create a [employer] ID. This will be your [employer] login ID and cannot be changed, so think of one that is secure and easy to remember. 6. Create a [employer] password. You can change your password at any time. 7. Enter your Password Reset Question and Answer. This can be used at a later time if you forget your password. 8. Enter your e-mail address. You will receive e-mail notification when new information is available in [address] 9. Click Sign Up. You can now view and download portions of your medical record. 10. Click the Download Summary menu link to download a portable copy of your medical information. If you have questions, please visit the Frequently Asked Questions section of the [employer] website. Remember, [employer] is NOT to be used for urgent needs. For medical emergencies, dial 911. Now available from your iPhone and Android! General Information Please provide this summary of care documentation to your next provider. Patient Signature:  ____________________________________________________________ Date:  ____________________________________________________________  
  
Ning Moulding Provider Signature:  ____________________________________________________________ Date:  ____________________________________________________________

## 2017-01-30 NOTE — IP AVS SNAPSHOT
Current Discharge Medication List  
  
ASK your doctor about these medications Dose & Instructions Dispensing Information Comments Morning Noon Evening Bedtime * albuterol 90 mcg/actuation inhaler Commonly known as:  PROAIR HFA Your next dose is: Today, Tomorrow Other:  ____________ Dose:  2 Puff Take 2 Puffs by inhalation every four (4) hours as needed for Wheezing. Quantity:  1 Inhaler Refills:  5  
     
   
   
   
  
 * albuterol 2.5 mg /3 mL (0.083 %) nebulizer solution Commonly known as:  PROVENTIL VENTOLIN Your next dose is: Today, Tomorrow Other:  ____________ Dose:  2.5 mg  
3 mL by Nebulization route every four (4) hours as needed for Wheezing. Quantity:  100 Each Refills:  5  
     
   
   
   
  
 aspirin 81 mg chewable tablet Your next dose is: Today, Tomorrow Other:  ____________ Dose:  81 mg Take 1 Tab by mouth daily. Quantity:  30 Tab Refills:  5 Blood-Glucose Meter monitoring kit Your next dose is: Today, Tomorrow Other:  ____________ Use up to three times per days Quantity:  1 Kit Refills:  0  
     
   
   
   
  
 diphenoxylate-atropine 2.5-0.025 mg per tablet Commonly known as:  LOMOTIL Your next dose is: Today, Tomorrow Other:  ____________ Dose:  1 Tab Take 1 Tab by mouth four (4) times daily as needed for Diarrhea. Max Daily Amount: 4 Tabs. Quantity:  20 Tab Refills:  0  
     
   
   
   
  
 glucose blood VI test strips strip Commonly known as:  ASCENSIA AUTODISC VI, ONE TOUCH ULTRA TEST VI Your next dose is: Today, Tomorrow Other:  ____________ Use up to four times daily Quantity:  100 Strip Refills:  5  
     
   
   
   
  
 glyBURIDE 1.25 mg tablet Commonly known as:  Isha Ohms Your next dose is: Today, Tomorrow Other:  ____________ Take 1/2 pill if you have an evening snack Quantity:  30 Tab Refills:  3  
     
   
   
   
  
 labetalol 100 mg tablet Commonly known as:  Vallarie Grove Your next dose is: Today, Tomorrow Other:  ____________ Dose:  100 mg Take 100 mg by mouth two (2) times a day. Refills:  0 Lancets Misc Your next dose is: Today, Tomorrow Other:  ____________ Use up to three times per days;  
 Quantity:  1 Each Refills:  11  
     
   
   
   
  
 metFORMIN 1,000 mg tablet Commonly known as:  GLUCOPHAGE Your next dose is: Today, Tomorrow Other:  ____________ Dose:  500 mg Take 0.5 Tabs by mouth two (2) times a day. Quantity:  60 Tab Refills:  5 PEN NEEDLE 31 gauge x 5/16\" Ndle Generic drug:  Insulin Needles (Disposable) Your next dose is: Today, Tomorrow Other:  ____________ Refills:  0  
     
   
   
   
  
 prenatal vitamin 27 mg iron- 800 mcg Tab tablet Your next dose is: Today, Tomorrow Other:  ____________  
   
   
 daily. Refills:  0 SITagliptin 100 mg tablet Commonly known as:  Margarette Nathanael Your next dose is: Today, Tomorrow Other:  ____________ Dose:  100 mg Take 1 Tab by mouth daily. Quantity:  30 Tab Refills:  5  
     
   
   
   
  
 * Notice: This list has 2 medication(s) that are the same as other medications prescribed for you. Read the directions carefully, and ask your doctor or other care provider to review them with you.

## 2017-01-30 NOTE — PROGRESS NOTES
Admitted to labor and delivery triage with c/o lower abd. Cramping and loer sides stabbing back pain all day yesterday. Abd cramping q 3 min since 2100. Pressure on chest noted around 2100 and none noted at this time. Intro. Done. poc explained orient to rm. To br to void and change clothes. 0021. Back in bed, efm explained and applied. Assess. Done. Consents explained and signed. Verbalized understanding of instr. siderails up x 2 and call light within reach. 0050. Ua sent to lab. 0234. Dr. Dipika Leon at bedside, view strip. poc explained. Assess. Done. Sve done closed/no presenting part noted. poc explained. L/o efm off.    0255. bld drawn and sent to lab. Bs  125 mg/dl    0405. Awake. Ready to go home. poc explained. Waiting on lab result. efm explained and applied. Assess. Done. No complaint at this time. 5076. Dr. Dipika Leon at bedside. View vls and lab results. poc explained. Assess. Done. Discuss result with pt. L/o. Discharge instr. Given. Verbalized understanding and questions answered. 0423. oob to br to void and change clothes. 1865. Home undelivered with instr. Accompanied by SO in stable cond.

## 2017-01-30 NOTE — PROGRESS NOTES
CBC, CMP both normal.  Patient states back pain is better. IMPRESSION:  Back pain, uncertain cause, but improved. Diarrhea, possibly related to milk intake. PLAN:  Home now. Avoid milk              Discuss with Dr. Hina Segundo.

## 2017-01-31 LAB
BACTERIA SPEC CULT: NORMAL
CC UR VC: NORMAL
SERVICE CMNT-IMP: NORMAL

## 2017-02-02 ENCOUNTER — OFFICE VISIT (OUTPATIENT)
Dept: ENDOCRINOLOGY | Age: 32
End: 2017-02-02

## 2017-02-02 VITALS
HEIGHT: 65 IN | BODY MASS INDEX: 41.85 KG/M2 | WEIGHT: 251.2 LBS | DIASTOLIC BLOOD PRESSURE: 72 MMHG | SYSTOLIC BLOOD PRESSURE: 121 MMHG | HEART RATE: 86 BPM

## 2017-02-02 DIAGNOSIS — O24.113 PRE-EXISTING TYPE 2 DIABETES MELLITUS DURING PREGNANCY IN THIRD TRIMESTER: Primary | ICD-10-CM

## 2017-02-02 LAB — HBA1C MFR BLD HPLC: 6.7 %

## 2017-02-02 NOTE — PROGRESS NOTES
Chief Complaint   Patient presents with    Diabetes     32 weeks pregnant. OB is Dr. Edson Herrera. Eye exam UTD   Records since last visit reviewed  History of Present Illness: Ally Thomas is a 33 y.o. female here for follow up of diabetes. She was diagnosed with diabetes at the age of 21 so her DM pre-dates her pregnancy. Since our last visit pt has had multiple ED visits for nose bleed and Diarrhea with back pain, she was also diagnosed with an acute Asthma attack on 17. She was cut back on the Metformin to 500mg BID her diarrhea improved. She was given steroids and Z-Osman for her asthma. This increased her BGs and when she was in the hospital she required some insulin to control her BGs. Pt is currently taking Metformin 500mg BID and and Januvia 100mg daily    Her FBGs have been 's with pot meal in the 100-130's. Pt is 32 weeks today, her MARIO is 3-14-17, with plan for , \"because my pelvis is too small\". She notes the on  she moved to [place], [place]. She is very happy with her new house. Pt wakes around 7-8AM  Pt is eating 3 meals per day and a snack  - breakfast: She has breakfast around 9AM, yesterday she had eggs, forrest and potatoes and pancake with strawberries and water. - She does not tend to snack in the mid-morning, yesterday she had SF jello. - lunch: She has lunch around Noon-1PM, yesterday she had two slices of thin crust pizza and water. - She will occasionally have a mid-afternoon snack, if she does she will have crackers  - dinner: She has dinner around 5-6PM, last night she had two slices of pizza and water. - She will have an evening snack of nabs or grapes. Last night she had a slice of Jordanian chocolate cake. She notes chocolate is her craving and she will have some chocolate \"every now and then\". If she has a snack in the evening she will take Glyburide 1.25mg; 1/2 tablet.     She has been not been walking at home because of her asthma and the cold weather. No history of vascular disease. No history of retinopathy, neuropathy, or nephropathy. Last eye exam was July 2016, by Dr. Shae Klein, no retinopathy. Current Outpatient Prescriptions   Medication Sig    metFORMIN (GLUCOPHAGE) 1,000 mg tablet Take 0.5 Tabs by mouth two (2) times a day.  SITagliptin (JANUVIA) 100 mg tablet Take 1 Tab by mouth daily.  albuterol (PROVENTIL VENTOLIN) 2.5 mg /3 mL (0.083 %) nebulizer solution 3 mL by Nebulization route every four (4) hours as needed for Wheezing.  diphenoxylate-atropine (LOMOTIL) 2.5-0.025 mg per tablet Take 1 Tab by mouth four (4) times daily as needed for Diarrhea. Max Daily Amount: 4 Tabs.  glyBURIDE (DIABETA) 1.25 mg tablet Take 1/2 pill if you have an evening snack    labetalol (NORMODYNE) 100 mg tablet Take 100 mg by mouth two (2) times a day.  PRENATAL VIT#96/FERROUS FUM/FA (PRENATAL VITAMIN) 27 mg iron- 800 mcg tab tablet daily.  aspirin 81 mg chewable tablet Take 1 Tab by mouth daily.  Lancets misc Use up to three times per days;    glucose blood VI test strips (ASCENSIA AUTODISC VI, ONE TOUCH ULTRA TEST VI) strip Use up to four times daily    Blood-Glucose Meter monitoring kit Use up to three times per days    albuterol (PROAIR HFA) 90 mcg/actuation inhaler Take 2 Puffs by inhalation every four (4) hours as needed for Wheezing.  PEN NEEDLE 31 X 5/16 \" ndle      No current facility-administered medications for this visit.       Allergies   Allergen Reactions    Mirapex [Pramipexole] Drowsiness    Morphine Rash    Tramadol Other (comments)     headache     Review of Systems:  - Eyes: no blurry vision or double vision  - Cardiovascular: no chest pain  - Respiratory: no shortness of breath  - Musculoskeletal: no myalgias  - Neurological: no numbness/tingling in extremities    Physical Examination:  Blood pressure 121/72, pulse 86, height 5' 5\" (1.651 m), weight 251 lb 3.2 oz (113.9 kg), last menstrual period 05/10/2016.  - General: pleasant, no distress, good eye contact   - Neck: no carotid bruits  - Cardiovascular: regular, normal rate, nl s1 and s2, no m/r/g, 2+ DP pulses   - Respiratory: clear bilaterally  - Integumentary: no edema, no foot ulcers, sensation to monofilament and vibration intact bilaterally  - Psychiatric: normal mood and affect    Data Reviewed:   A1C today was 6.7%    Assessment/Plan:   1) DM > Her A1C was higher, but she was on steroids for asthma and required some insulin during her hospitalization. Her FBGs have been a little high but her post-prandial BGs have been ok. Pt to mail BG logs to me in 2 weeks, if her FBGs are still too high, will restart HS Glyburide    RTC 3/2/17    Pt voices understanding and agreement with the plan. Pain noted and pt was recommended to call Dr. Claudene Commodore, as needed      Follow-up Disposition:  Return in 4 weeks (on 3/2/2017). Copy sent to:  Liset Arguello and Claudene Commodore

## 2017-02-02 NOTE — MR AVS SNAPSHOT
Visit Information Date & Time Provider Department Dept. Phone Encounter #  
 2/2/2017  9:30 AM Gabe Oliver MD [facility] [phone] [id number] Follow-up Instructions Return in 4 weeks (on 3/2/2017). Upcoming Health Maintenance Date Due Pneumococcal 19-64 Medium Risk (1 of 1 - PPSV23) 10/14/2002 LIPID PANEL Q1 4/10/2016 PAP AKA CERVICAL CYTOLOGY 5/6/2017 HEMOGLOBIN A1C Q6M 6/7/2017 MICROALBUMIN Q1 6/21/2017 EYE EXAM RETINAL OR DILATED Q1 7/18/2017 FOOT EXAM Q1 11/11/2017 DTaP/Tdap/Td series (2 - Td) 7/6/2026 Allergies as of 2/2/2017  Review Complete On: 2/2/2017 By: Gabe Oliver MD  
  
 Severity Noted Reaction Type Reactions Mirapex [Pramipexole]  05/16/2016    Drowsiness Morphine  03/23/2016    Rash Tramadol  05/10/2016    Other (comments)  
 headache Current Immunizations  Reviewed on 1/30/2017 Name Date Influenza Vaccine 10/1/2016 Tdap 7/6/2016 Not reviewed this visit Vitals BP Pulse Height(growth percentile) Weight(growth percentile) LMP BMI  
 121/72 86 5' 5\" (1.651 m) 251 lb 3.2 oz (113.9 kg) 05/10/2016 41.8 kg/m2 OB Status Smoking Status Pregnant Former Smoker BMI and BSA Data Body Mass Index Body Surface Area  
 41.8 kg/m 2 2.29 m 2 Preferred Pharmacy Pharmacy Name Phone 150 [facility], [address] [phone] Your Updated Medication List  
  
   
This list is accurate as of: 2/2/17  9:58 AM.  Always use your most recent med list.  
  
  
  
  
 * albuterol 90 mcg/actuation inhaler Commonly known as:  PROAIR HFA Take 2 Puffs by inhalation every four (4) hours as needed for Wheezing. * albuterol 2.5 mg /3 mL (0.083 %) nebulizer solution Commonly known as:  PROVENTIL VENTOLIN  
3 mL by Nebulization route every four (4) hours as needed for Wheezing. aspirin 81 mg chewable tablet Take 1 Tab by mouth daily. Blood-Glucose Meter monitoring kit Use up to three times per days diphenoxylate-atropine 2.5-0.025 mg per tablet Commonly known as:  LOMOTIL Take 1 Tab by mouth four (4) times daily as needed for Diarrhea. Max Daily Amount: 4 Tabs. glucose blood VI test strips strip Commonly known as:  ASCENSIA AUTODISC VI, ONE TOUCH ULTRA TEST VI  
Use up to four times daily  
  
 glyBURIDE 1.25 mg tablet Commonly known as:  Radha Tayla Take 1/2 pill if you have an evening snack  
  
 labetalol 100 mg tablet Commonly known as:  Suella Cosier Take 100 mg by mouth two (2) times a day. Lancets Misc Use up to three times per days;  
  
 metFORMIN 1,000 mg tablet Commonly known as:  GLUCOPHAGE Take 0.5 Tabs by mouth two (2) times a day. PEN NEEDLE 31 gauge x 5/16\" Ndle Generic drug:  Insulin Needles (Disposable)  
  
 prenatal vitamin 27 mg iron- 800 mcg Tab tablet  
daily. SITagliptin 100 mg tablet Commonly known as:  Vallerkerry Mulligan Take 1 Tab by mouth daily. * Notice: This list has 2 medication(s) that are the same as other medications prescribed for you. Read the directions carefully, and ask your doctor or other care provider to review them with you. Follow-up Instructions Return in 4 weeks (on 3/2/2017). Introducing [facility]! Edilberto Lowery introduces MySQUAR patient portal. Now you can access parts of your medical record, email your doctor's office, and request medication refills online. 1. In your internet browser, go to [email or web address] 2. Click on the First Time User? Click Here link in the Sign In box. You will see the New Member Sign Up page. 3. Enter your [employer] Access Code exactly as it appears below. You will not need to use this code after youve completed the sign-up process.  If you do not sign up before the expiration date, you must request a new code. 
 
· [employer] Access Code: [email or web address] Expires: 2/9/2017  2:41 PM 
 
4. Enter the last four digits of your Social Security Number (xxxx) and Date of Birth (mm/dd/yyyy) as indicated and click Submit. You will be taken to the next sign-up page. 5. Create a [employer] ID. This will be your [employer] login ID and cannot be changed, so think of one that is secure and easy to remember. 6. Create a V[employer] password. You can change your password at any time. 7. Enter your Password Reset Question and Answer. This can be used at a later time if you forget your password. 8. Enter your e-mail address. You will receive e-mail notification when new information is available in [address] 9. Click Sign Up. You can now view and download portions of your medical record. 10. Click the Download Summary menu link to download a portable copy of your medical information. If you have questions, please visit the Frequently Asked Questions section of the [employer] website. Remember, [employer] is NOT to be used for urgent needs. For medical emergencies, dial 911. Now available from your iPhone and Android! Please provide this summary of care documentation to your next provider. Your primary care clinician is listed as George Pena. If you have any questions after today's visit, please call [phone].

## 2017-02-05 ENCOUNTER — HOSPITAL ENCOUNTER (EMERGENCY)
Age: 32
Discharge: HOME | End: 2017-02-05
Attending: SPECIALIST | Admitting: SPECIALIST
Payer: MEDICAID

## 2017-02-05 VITALS
SYSTOLIC BLOOD PRESSURE: 136 MMHG | RESPIRATION RATE: 18 BRPM | TEMPERATURE: 98.2 F | HEART RATE: 94 BPM | DIASTOLIC BLOOD PRESSURE: 61 MMHG | OXYGEN SATURATION: 99 %

## 2017-02-05 PROBLEM — O36.8130 DECREASED FETAL MOVEMENT DURING PREGNANCY IN THIRD TRIMESTER, ANTEPARTUM: Status: ACTIVE | Noted: 2017-02-05

## 2017-02-05 LAB
ALBUMIN SERPL BCP-MCNC: 2.3 G/DL (ref 3.5–5)
ALBUMIN/GLOB SERPL: 0.6 {RATIO} (ref 1.1–2.2)
ALP SERPL-CCNC: 90 U/L (ref 45–117)
ALT SERPL-CCNC: 82 U/L (ref 12–78)
ANION GAP BLD CALC-SCNC: 10 MMOL/L (ref 5–15)
AST SERPL W P-5'-P-CCNC: 36 U/L (ref 15–37)
BASOPHILS # BLD AUTO: 0 K/UL (ref 0–0.1)
BASOPHILS # BLD: 0 % (ref 0–1)
BILIRUB SERPL-MCNC: 0.3 MG/DL (ref 0.2–1)
BUN SERPL-MCNC: 5 MG/DL (ref 6–20)
BUN/CREAT SERPL: 10 (ref 12–20)
CALCIUM SERPL-MCNC: 8.5 MG/DL (ref 8.5–10.1)
CHLORIDE SERPL-SCNC: 107 MMOL/L (ref 97–108)
CO2 SERPL-SCNC: 23 MMOL/L (ref 21–32)
CREAT SERPL-MCNC: 0.49 MG/DL (ref 0.55–1.02)
EOSINOPHIL # BLD: 0.1 K/UL (ref 0–0.4)
EOSINOPHIL NFR BLD: 1 % (ref 0–7)
ERYTHROCYTE [DISTWIDTH] IN BLOOD BY AUTOMATED COUNT: 14.8 % (ref 11.5–14.5)
GLOBULIN SER CALC-MCNC: 3.8 G/DL (ref 2–4)
GLUCOSE BLD STRIP.AUTO-MCNC: 121 MG/DL (ref 65–100)
GLUCOSE SERPL-MCNC: 136 MG/DL (ref 65–100)
HCT VFR BLD AUTO: 28.4 % (ref 35–47)
HGB BLD-MCNC: 8.8 G/DL (ref 11.5–16)
LYMPHOCYTES # BLD AUTO: 22 % (ref 12–49)
LYMPHOCYTES # BLD: 2.4 K/UL (ref 0.8–3.5)
MCH RBC QN AUTO: 23.7 PG (ref 26–34)
MCHC RBC AUTO-ENTMCNC: 31 G/DL (ref 30–36.5)
MCV RBC AUTO: 76.5 FL (ref 80–99)
MONOCYTES # BLD: 0.9 K/UL (ref 0–1)
MONOCYTES NFR BLD AUTO: 8 % (ref 5–13)
NEUTS SEG # BLD: 7.6 K/UL (ref 1.8–8)
NEUTS SEG NFR BLD AUTO: 69 % (ref 32–75)
PLATELET # BLD AUTO: 296 K/UL (ref 150–400)
POTASSIUM SERPL-SCNC: 3.9 MMOL/L (ref 3.5–5.1)
PROT SERPL-MCNC: 6.1 G/DL (ref 6.4–8.2)
RBC # BLD AUTO: 3.71 M/UL (ref 3.8–5.2)
SERVICE CMNT-IMP: ABNORMAL
SODIUM SERPL-SCNC: 140 MMOL/L (ref 136–145)
WBC # BLD AUTO: 11 K/UL (ref 3.6–11)

## 2017-02-05 PROCEDURE — 82962 GLUCOSE BLOOD TEST: CPT

## 2017-02-05 PROCEDURE — 36415 COLL VENOUS BLD VENIPUNCTURE: CPT | Performed by: SPECIALIST

## 2017-02-05 PROCEDURE — 80053 COMPREHEN METABOLIC PANEL: CPT | Performed by: SPECIALIST

## 2017-02-05 PROCEDURE — 59025 FETAL NON-STRESS TEST: CPT

## 2017-02-05 PROCEDURE — 85025 COMPLETE CBC W/AUTO DIFF WBC: CPT | Performed by: SPECIALIST

## 2017-02-05 PROCEDURE — 99284 EMERGENCY DEPT VISIT MOD MDM: CPT

## 2017-02-05 NOTE — DISCHARGE INSTRUCTIONS
You came into L&D due to decreased fetal movement and an elevated (190) blood sugar after lunch; your blood sugar here was 121 and you had a reactive NST (monitoring of the baby). Continue taking your blood sugar as normal and continue taking all medications as normal as well. Keep your scheduled appointment with Dr Melvina Shell on [address] 17 and call Dr Saritha Dempsey office tomorrow (17) to see when he wants you to be seen by him. Weeks 34 to 36 of Your Pregnancy: Care Instructions  Your Care Instructions    By now, your baby and your belly have grown quite large. It is almost time to give birth. A full-term pregnancy can deliver between 37 and 42 weeks. Your baby's lungs are almost ready to breathe air. The bones in your baby's head are now firm enough to protect it, but soft enough to move down through the birth canal.  You may feel excited, happy, anxious, or scared. You may wonder how you will know if you are in labor or what to expect during labor. Try to be flexible in your expectations of the birth. Because each birth is different, there is no way to know exactly what childbirth will be like for you. This care sheet will help you know what to expect and how to prepare. This may make your childbirth easier. If you haven't already had the Tdap shot during this pregnancy, talk to your doctor about getting it. It will help protect your  against pertussis infection. In the 36th week, most women have a test for group B streptococcus (GBS). GBS is a common bacteria that can live in the vagina and rectum. It can make your baby sick after birth. If you test positive, you will get antibiotics during labor. The medicine will keep your baby from getting the bacteria. Follow-up care is a key part of your treatment and safety. Be sure to make and go to all appointments, and call your doctor if you are having problems.  It's also a good idea to know your test results and keep a list of the medicines you take. How can you care for yourself at home? Learn about pain relief choices  · Pain is different for every woman. Talk with your doctor about your feelings about pain. · You can choose from several types of pain relief. These include medicine or breathing techniques, as well as comfort measures. You can use more than one option. · If you choose to have pain medicine during labor, talk to your doctor about your options. Some medicines lower anxiety and help with some of the pain. Others make your lower body numb so that you won't feel pain. · Be sure to tell your doctor about your pain medicine choice before you start labor or very early in your labor. You may be able to change your mind as labor progresses. · Rarely, a woman is put to sleep by medicine given through a mask or an IV. Labor and delivery  · The first stage of labor has three parts: early, active, and transition. ¨ Most women have early labor at home. You can stay busy or rest, eat light snacks, drink clear fluids, and start counting contractions. ¨ When talking during a contraction gets hard, you may be moving to active labor. During active labor, you should head for the hospital if you are not there already. ¨ You are in active labor when contractions come every 3 to 4 minutes and last about 60 seconds. Your cervix is opening more rapidly. ¨ If your water breaks, contractions will come faster and stronger. ¨ During transition, your cervix is stretching, and contractions are coming more rapidly. ¨ You may want to push, but your cervix might not be ready. Your doctor will tell you when to push. · The second stage starts when your cervix is completely opened and you are ready to push. ¨ Contractions are very strong to push the baby down the birth canal.  ¨ You will feel the urge to push. You may feel like you need to have a bowel movement. ¨ You may be coached to push with contractions.  These contractions will be very strong, but you will not have them as often. You can get a little rest between contractions. ¨ You may be emotional and irritable. You may not be aware of what is going on around you. ¨ One last push, and your baby is born. · The third stage is when a few more contractions push out the placenta. This may take 30 minutes or less. · The fourth stage is the welcome recovery. You may feel overwhelmed with emotions and exhausted but alert. This is a good time to start breastfeeding. Where can you learn more? Go to http://sheree-frank.info/. Enter F558 in the search box to learn more about \"Weeks 34 to 36 of Your Pregnancy: Care Instructions. \"  Current as of: May 30, 2016  Content Version: 11.1  © 7963-6503 [employer] ([employer] Incorporated. Care instructions adapted under license by [employer] (which disclaims liability or warranty for this information). If you have questions about a medical condition or this instruction, always ask your healthcare professional. [place] [address] any warranty or liability for your use of this information.

## 2017-02-05 NOTE — IP AVS SNAPSHOT
Current Discharge Medication List  
  
ASK your doctor about these medications Dose & Instructions Dispensing Information Comments Morning Noon Evening Bedtime * albuterol 90 mcg/actuation inhaler Commonly known as:  PROAIR HFA Your next dose is: Today, Tomorrow Other:  ____________ Dose:  2 Puff Take 2 Puffs by inhalation every four (4) hours as needed for Wheezing. Quantity:  1 Inhaler Refills:  5  
     
   
   
   
  
 * albuterol 2.5 mg /3 mL (0.083 %) nebulizer solution Commonly known as:  PROVENTIL VENTOLIN Your next dose is: Today, Tomorrow Other:  ____________ Dose:  2.5 mg  
3 mL by Nebulization route every four (4) hours as needed for Wheezing. Quantity:  100 Each Refills:  5  
     
   
   
   
  
 aspirin 81 mg chewable tablet Your next dose is: Today, Tomorrow Other:  ____________ Dose:  81 mg Take 1 Tab by mouth daily. Quantity:  30 Tab Refills:  5 Blood-Glucose Meter monitoring kit Your next dose is: Today, Tomorrow Other:  ____________ Use up to three times per days Quantity:  1 Kit Refills:  0  
     
   
   
   
  
 diphenoxylate-atropine 2.5-0.025 mg per tablet Commonly known as:  LOMOTIL Your next dose is: Today, Tomorrow Other:  ____________ Dose:  1 Tab Take 1 Tab by mouth four (4) times daily as needed for Diarrhea. Max Daily Amount: 4 Tabs. Quantity:  20 Tab Refills:  0  
     
   
   
   
  
 glucose blood VI test strips strip Commonly known as:  ASCENSIA AUTODISC VI, ONE TOUCH ULTRA TEST VI Your next dose is: Today, Tomorrow Other:  ____________ Use up to four times daily Quantity:  100 Strip Refills:  5  
     
   
   
   
  
 glyBURIDE 1.25 mg tablet Commonly known as:  Rabia Dk Your next dose is: Today, Tomorrow Other:  ____________ Take 1/2 pill if you have an evening snack Quantity:  30 Tab Refills:  3  
     
   
   
   
  
 labetalol 100 mg tablet Commonly known as:  Isaura Locket Your next dose is: Today, Tomorrow Other:  ____________ Dose:  100 mg Take 100 mg by mouth two (2) times a day. Refills:  0 Lancets Misc Your next dose is: Today, Tomorrow Other:  ____________ Use up to three times per days;  
 Quantity:  1 Each Refills:  11  
     
   
   
   
  
 metFORMIN 1,000 mg tablet Commonly known as:  GLUCOPHAGE Your next dose is: Today, Tomorrow Other:  ____________ Dose:  500 mg Take 0.5 Tabs by mouth two (2) times a day. Quantity:  60 Tab Refills:  5 PEN NEEDLE 31 gauge x 5/16\" Ndle Generic drug:  Insulin Needles (Disposable) Your next dose is: Today, Tomorrow Other:  ____________ Refills:  0  
     
   
   
   
  
 prenatal vitamin 27 mg iron- 800 mcg Tab tablet Your next dose is: Today, Tomorrow Other:  ____________  
   
   
 daily. Refills:  0 SITagliptin 100 mg tablet Commonly known as:  Jaskaran Prince Your next dose is: Today, Tomorrow Other:  ____________ Dose:  100 mg Take 1 Tab by mouth daily. Quantity:  30 Tab Refills:  5  
     
   
   
   
  
 * Notice: This list has 2 medication(s) that are the same as other medications prescribed for you. Read the directions carefully, and ask your doctor or other care provider to review them with you.

## 2017-02-05 NOTE — PROGRESS NOTES
Pt arrived to unit after talking to the NP at [facility]; was told to come to L&D for evaluation due to c/o decreased fetal movement and elevated blood sugars. Pt has CHTN and takes labetalol 100mg bid; also Diabetic and takes metformin 500mg in am and at dinner and jenuvia 100mg in the am; baby ASA in the am; also prenatal vitamin daily, Glyburide 2.5mg PO in the evening with a snack. @8792 Labs drawn without difficulty; finger stick blood sugar done 121;     @1700 pt acknowledges increased fetal movement;     @1815  Dr Melvin Velazquez to bedside to discuss POC; reviewed fhr strip, labs; pt for discharge    @3145 Reviewed dc instructions with pt; pt denies questions; signed electronically; paper copy provided to pt; pt ambulated off unit; states she has all belongings.

## 2017-02-05 NOTE — IP AVS SNAPSHOT
[address] [facility] 
[phone] Patient: Ferny Rai MRN: [id number] :1985 You are allergic to the following Allergen Reactions Mirapex (Pramipexole) Drowsiness Morphine Rash Tramadol Other (comments)  
 headache Recent Documentation OB Status Smoking Status Pregnant Former Smoker Unresulted Labs Order Current Status SAMPLE TO BLOOD BANK In process Emergency Contacts Name Discharge Info Relation Home Work Mobile Scooter Warner DISCHARGE CAREGIVER [3]    [phone] About your hospitalization You were admitted on:  N/A You last received care in the:  [facility] LABOR & DELIVERY You were discharged on:  2017 Unit phone number:  [phone] Why you were hospitalized Your primary diagnosis was:  Not on File Providers Seen During Your Hospitalizations Provider Role Specialty Primary office phone Archana Batres MD Attending Provider Obstetrics & Gynecology [phone] Your Primary Care Physician (PCP) Primary Care Physician Office Phone Office Fax Lluvia Morin [phone] Follow-up Information Follow up With Details Comments Contact Info Rosa Elena Yeager MD   [address] [place] [address] Primary Care [address] [place] [address] 
[phone] Your Appointments 2017  9:30 AM EST Follow Up with Yung Denny MD  
[facility] [address] [address] [phone] Current Discharge Medication List  
  
ASK your doctor about these medications Dose & Instructions Dispensing Information Comments Morning Noon Evening Bedtime * albuterol 90 mcg/actuation inhaler Commonly known as:  PROAIR HFA  
   
 Your next dose is: Today, Tomorrow Other:  _________ Dose:  2 Puff Take 2 Puffs by inhalation every four (4) hours as needed for Wheezing. Quantity:  1 Inhaler Refills:  5  
     
   
   
   
  
 * albuterol 2.5 mg /3 mL (0.083 %) nebulizer solution Commonly known as:  PROVENTIL VENTOLIN Your next dose is: Today, Tomorrow Other:  _________ Dose:  2.5 mg  
3 mL by Nebulization route every four (4) hours as needed for Wheezing. Quantity:  100 Each Refills:  5  
     
   
   
   
  
 aspirin 81 mg chewable tablet Your next dose is: Today, Tomorrow Other:  _________ Dose:  81 mg Take 1 Tab by mouth daily. Quantity:  30 Tab Refills:  5 Blood-Glucose Meter monitoring kit Your next dose is: Today, Tomorrow Other:  _________ Use up to three times per days Quantity:  1 Kit Refills:  0  
     
   
   
   
  
 diphenoxylate-atropine 2.5-0.025 mg per tablet Commonly known as:  LOMOTIL Your next dose is: Today, Tomorrow Other:  _________ Dose:  1 Tab Take 1 Tab by mouth four (4) times daily as needed for Diarrhea. Max Daily Amount: 4 Tabs. Quantity:  20 Tab Refills:  0  
     
   
   
   
  
 glucose blood VI test strips strip Commonly known as:  ASCENSIA AUTODISC VI, ONE TOUCH ULTRA TEST VI Your next dose is: Today, Tomorrow Other:  _________ Use up to four times daily Quantity:  100 Strip Refills:  5  
     
   
   
   
  
 glyBURIDE 1.25 mg tablet Commonly known as:  Rabia Dk Your next dose is: Today, Tomorrow Other:  _________ Take 1/2 pill if you have an evening snack Quantity:  30 Tab Refills:  3  
     
   
   
   
  
 labetalol 100 mg tablet Commonly known as:  Julio C Belcher Your next dose is: Today, Tomorrow Other:  _________  Dose:  100 mg  
 Take 100 mg by mouth two (2) times a day. Refills:  0 Lancets Misc Your next dose is: Today, Tomorrow Other:  _________ Use up to three times per days;  
 Quantity:  1 Each Refills:  11  
     
   
   
   
  
 metFORMIN 1,000 mg tablet Commonly known as:  GLUCOPHAGE Your next dose is: Today, Tomorrow Other:  _________ Dose:  500 mg Take 0.5 Tabs by mouth two (2) times a day. Quantity:  60 Tab Refills:  5 PEN NEEDLE 31 gauge x 5/16\" Ndle Generic drug:  Insulin Needles (Disposable) Your next dose is: Today, Tomorrow Other:  _________ Refills:  0  
     
   
   
   
  
 prenatal vitamin 27 mg iron- 800 mcg Tab tablet Your next dose is: Today, Tomorrow Other:  _________  
   
   
 daily. Refills:  0 SITagliptin 100 mg tablet Commonly known as:  Merline Pesa Your next dose is: Today, Tomorrow Other:  _________ Dose:  100 mg Take 1 Tab by mouth daily. Quantity:  30 Tab Refills:  5  
     
   
   
   
  
 * Notice: This list has 2 medication(s) that are the same as other medications prescribed for you. Read the directions carefully, and ask your doctor or other care provider to review them with you. Discharge Instructions You came into L&D due to decreased fetal movement and an elevated (190) blood sugar after lunch; your blood sugar here was 121 and you had a reactive NST (monitoring of the baby). Continue taking your blood sugar as normal and continue taking all medications as normal as well. Keep your scheduled appointment with Dr Manoj Izquierdo on [address] [place] [address] Drive 2/9/17 and call Dr Joseph Taylor office tomorrow (Monday 2/6/17) to see when he wants you to be seen by him. Weeks 34 to 36 of Your Pregnancy: Care Instructions Your Care Instructions By now, your baby and your belly have grown quite large. It is almost time to give birth. A full-term pregnancy can deliver between 37 and 42 weeks. Your baby's lungs are almost ready to breathe air. The bones in your baby's head are now firm enough to protect it, but soft enough to move down through the birth canal. 
You may feel excited, happy, anxious, or scared. You may wonder how you will know if you are in labor or what to expect during labor. Try to be flexible in your expectations of the birth. Because each birth is different, there is no way to know exactly what childbirth will be like for you. This care sheet will help you know what to expect and how to prepare. This may make your childbirth easier. If you haven't already had the Tdap shot during this pregnancy, talk to your doctor about getting it. It will help protect your  against pertussis infection. In the 36th week, most women have a test for group B streptococcus (GBS). GBS is a common bacteria that can live in the vagina and rectum. It can make your baby sick after birth. If you test positive, you will get antibiotics during labor. The medicine will keep your baby from getting the bacteria. Follow-up care is a key part of your treatment and safety. Be sure to make and go to all appointments, and call your doctor if you are having problems. It's also a good idea to know your test results and keep a list of the medicines you take. How can you care for yourself at home? Learn about pain relief choices · Pain is different for every woman. Talk with your doctor about your feelings about pain. · You can choose from several types of pain relief. These include medicine or breathing techniques, as well as comfort measures. You can use more than one option. · If you choose to have pain medicine during labor, talk to your doctor about your options.  Some medicines lower anxiety and help with some of the pain. Others make your lower body numb so that you won't feel pain. · Be sure to tell your doctor about your pain medicine choice before you start labor or very early in your labor. You may be able to change your mind as labor progresses. · Rarely, a woman is put to sleep by medicine given through a mask or an IV. Labor and delivery · The first stage of labor has three parts: early, active, and transition. ¨ Most women have early labor at home. You can stay busy or rest, eat light snacks, drink clear fluids, and start counting contractions. ¨ When talking during a contraction gets hard, you may be moving to active labor. During active labor, you should head for the hospital if you are not there already. ¨ You are in active labor when contractions come every 3 to 4 minutes and last about 60 seconds. Your cervix is opening more rapidly. ¨ If your water breaks, contractions will come faster and stronger. ¨ During transition, your cervix is stretching, and contractions are coming more rapidly. ¨ You may want to push, but your cervix might not be ready. Your doctor will tell you when to push. · The second stage starts when your cervix is completely opened and you are ready to push. ¨ Contractions are very strong to push the baby down the birth canal. 
¨ You will feel the urge to push. You may feel like you need to have a bowel movement. ¨ You may be coached to push with contractions. These contractions will be very strong, but you will not have them as often. You can get a little rest between contractions. ¨ You may be emotional and irritable. You may not be aware of what is going on around you. ¨ One last push, and your baby is born. · The third stage is when a few more contractions push out the placenta. This may take 30 minutes or less. · The fourth stage is the welcome recovery. You may feel overwhelmed with emotions and exhausted but alert. This is a good time to start breastfeeding. Where can you learn more? Go to http://sheree-frank.info/. Enter Z165 in the search box to learn more about \"Weeks 34 to 36 of Your Pregnancy: Care Instructions. \" Current as of: May 30, 2016 Content Version: 11.1 © 6783-9060 [employer]. Care instructions adapted under license by [employer] (which disclaims liability or warranty for this information). If you have questions about a medical condition or this instruction, always ask your healthcare professional. [place] [address] any warranty or liability for your use of this information. Discharge Orders None Introducing [facility]! Agatha Ozuna introduces JellyCloud patient portal. Now you can access parts of your medical record, email your doctor's office, and request medication refills online. 1. In your internet browser, go to [email or web address]. [employer]/"[employer] 2. Click on the First Time User? Click Here link in the Sign In box. You will see the New Member Sign Up page. 3. Enter your JellyCloud Access Code exactly as it appears below. You will not need to use this code after youve completed the sign-up process. If you do not sign up before the expiration date, you must request a new code. · JellyCloud Access Code: [email or web address] Expires: 2/9/2017  2:41 PM 
 
4. Enter the last four digits of your Social Security Number (xxxx) and Date of Birth (mm/dd/yyyy) as indicated and click Submit. You will be taken to the next sign-up page. 5. Create a [employer] ID. This will be your JellyCloud login ID and cannot be changed, so think of one that is secure and easy to remember. 6. Create a JellyCloud password. You can change your password at any time. 7. Enter your Password Reset Question and Answer. This can be used at a later time if you forget your password. 8. Enter your e-mail address. You will receive e-mail notification when new information is available in [address] 9. Click Sign Up. You can now view and download portions of your medical record. 10. Click the Download Summary menu link to download a portable copy of your medical information. If you have questions, please visit the Frequently Asked Questions section of the [employer] website. Remember, [employer] Gup[employer] is NOT to be used for urgent needs. For medical emergencies, dial 911. Now available from your iPhone and Android! General Information Please provide this summary of care documentation to your next provider. Patient Signature:  ____________________________________________________________ Date:  ____________________________________________________________  
  
Jasmin Kay Provider Signature:  ____________________________________________________________ Date:  ____________________________________________________________

## 2017-02-05 NOTE — H&P
History & Physical    Name: Bhargavi Zhong MRN: [id number]  SSN: [id number]    YOB: 1985  Age: 33 y.o. Sex: female      Subjective:     Reason for Admission:  Pregnancy and decreased fetal activity. History of Present Illness: Bhargavi Zhong is a 33 y.o.  female with an estimated gestational age of 28w0d with Estimated Date of Delivery: 3/26/17. Patient complains of less fetal movement since yesterday evening. Also, states her post lunch fingerstick blood sugar was 191. Admits she ate a banana at breakfast today. Denies abdominal pain, vaginal bleeding, or leakage of vaginal fluid.      OB History    Para Term  AB SAB TAB Ectopic Multiple Living   1               # Outcome Date GA Lbr Byron/2nd Weight Sex Delivery Anes PTL Lv   1 Current                 Past Medical History   Diagnosis Date    Asthma     Diabetes ([facility] [address]     Hypertension      Past Surgical History   Procedure Laterality Date    Hx tympanostomy       Social History     Occupational History    disabled      Social History Main Topics    Smoking status: Former Smoker     Packs/day: 0.10     Types: Cigarettes     Start date: 3/23/2004     Quit date: 2017    Smokeless tobacco: Never Used    Alcohol use No    Drug use: No    Sexual activity: Yes     Partners: Male     Family History   Problem Relation Age of Onset    Diabetes Father     Heart Disease Father      CAD    Hypertension Father     Diabetes Mother     Hypertension Mother     Liver Disease Mother     Deep Vein Thrombosis Mother     Pulmonary Embolism Mother     Hypertension Sister     Deep Vein Thrombosis Sister     Pulmonary Embolism Sister     Diabetes Maternal Uncle     Cancer Maternal Grandmother      Lung Cancer       Allergies   Allergen Reactions    Mirapex [Pramipexole] Drowsiness    Morphine Rash    Tramadol Other (comments)     headache     Prior to Admission medications    Medication Sig Start Date End Date Taking? Authorizing Provider   metFORMIN (GLUCOPHAGE) 1,000 mg tablet Take 0.5 Tabs by mouth two (2) times a day. 1/18/17   Mike Ingram MD   SITagliptin (JANUVIA) 100 mg tablet Take 1 Tab by mouth daily. 1/18/17   Mike Ingram MD   albuterol (PROVENTIL VENTOLIN) 2.5 mg /3 mL (0.083 %) nebulizer solution 3 mL by Nebulization route every four (4) hours as needed for Wheezing. 1/11/17   Mike Ingram MD   diphenoxylate-atropine (LOMOTIL) 2.5-0.025 mg per tablet Take 1 Tab by mouth four (4) times daily as needed for Diarrhea. Max Daily Amount: 4 Tabs. 12/24/16   Veola Oppenheim, DO   glyBURIDE (DIABETA) 1.25 mg tablet Take 1/2 pill if you have an evening snack 12/7/16   Lavon Valdez MD   labetalol (NORMODYNE) 100 mg tablet Take 100 mg by mouth two (2) times a day. 8/1/16   Historical Provider   PRENATAL VIT#96/FERROUS FUM/FA (PRENATAL VITAMIN) 27 mg iron- 800 mcg tab tablet daily. 8/1/16   Historical Provider   aspirin 81 mg chewable tablet Take 1 Tab by mouth daily. 8/8/16   Bedias Janet, NP   Lancets misc Use up to three times per days; 7/6/16   Mike Ingram MD   glucose blood VI test strips (ASCENSIA AUTODISC VI, ONE TOUCH ULTRA TEST VI) strip Use up to four times daily 7/6/16   Mike Ingram MD   Blood-Glucose Meter monitoring kit Use up to three times per days 6/25/16   Mike Ingram MD   albuterol [facility] HFA) 90 mcg/actuation inhaler Take 2 Puffs by inhalation every four (4) hours as needed for Wheezing. 3/23/16   Mike Ingram MD   PEN NEEDLE 31 X 5/16 \" ndle  5/12/15   Phys Other, MD        Review of Systems   Constitutional: Negative for activity change and appetite change. Morbid obesity. HENT: Negative. Eyes: Negative. Respiratory: Negative. Cardiovascular: Negative. Gastrointestinal: Negative. Endocrine: Negative. Genitourinary: Negative. Musculoskeletal: Negative. Skin: Negative. Allergic/Immunologic: Negative. Neurological: Negative. Hematological: Negative. Objective:     Vitals:    Vitals:    17 1600 17 1750 17 1755 17 1800   BP: 101/55 136/61     Pulse: 92 94     Resp: 18      Temp: 98.2 °F (36.8 °C)      SpO2: 100% 100% 99% 99%      Temp (24hrs), Av.2 °F (36.8 °C), Min:98.2 °F (36.8 °C), Max:98.2 °F (36.8 °C)    BP  Min: 101/55  Max: 136/61     Physical Exam   Nursing note and vitals reviewed. Constitutional: She is oriented to person, place, and time. She appears well-developed and well-nourished. HENT:   Head: Normocephalic. Poor dentition. Neck: Normal range of motion. Cardiovascular: Normal rate and regular rhythm. Pulmonary/Chest: Effort normal.   Abdominal:   Gravid uterus. Obese abdomen with panniculus. FH 34 cm. No contractions but excellent FHR accelerations with fetal activity. , category 1. Genitourinary:   Genitourinary Comments: Deferred. Musculoskeletal: Normal range of motion. Neurological: She is alert and oriented to person, place, and time. Skin: Skin is warm. Psychiatric: She has a normal mood and affect. Her behavior is normal.       Cervical Exam: deferred. Uterine Activity: None  Membranes: Intact  Fetal Heart Rate: 155, category 1.        Labs:   Recent Results (from the past 24 hour(s))   GLUCOSE, POC    Collection Time: 17  4:26 PM   Result Value Ref Range    Glucose (POC) 121 (H) 65 - 100 mg/dL    Performed by MAYELIN Sutton    Collection Time: 17  4:35 PM   Result Value Ref Range    Sodium 140 136 - 145 mmol/L    Potassium 3.9 3.5 - 5.1 mmol/L    Chloride 107 97 - 108 mmol/L    CO2 23 21 - 32 mmol/L    Anion gap 10 5 - 15 mmol/L    Glucose 136 (H) 65 - 100 mg/dL    BUN 5 (L) 6 - 20 MG/DL    Creatinine 0.49 (L) 0.55 - 1.02 MG/DL    BUN/Creatinine ratio 10 (L) 12 - 20      GFR est AA >60 >60 ml/min/1.73m2    GFR est non-AA >60 >60 ml/min/1.73m2    Calcium 8.5 8.5 - 10.1 MG/DL    Bilirubin, total 0.3 0.2 - 1.0 MG/DL    ALT (SGPT) 82 (H) 12 - 78 U/L    AST (SGOT) 36 15 - 37 U/L    Alk. phosphatase 90 45 - 117 U/L    Protein, total 6.1 (L) 6.4 - 8.2 g/dL    Albumin 2.3 (L) 3.5 - 5.0 g/dL    Globulin 3.8 2.0 - 4.0 g/dL    A-G Ratio 0.6 (L) 1.1 - 2.2     CBC WITH AUTOMATED DIFF    Collection Time: 02/05/17  4:35 PM   Result Value Ref Range    WBC 11.0 3.6 - 11.0 K/uL    RBC 3.71 (L) 3.80 - 5.20 M/uL    HGB 8.8 (L) 11.5 - 16.0 g/dL    HCT 28.4 (L) 35.0 - 47.0 %    MCV 76.5 (L) 80.0 - 99.0 FL    MCH 23.7 (L) 26.0 - 34.0 PG    MCHC 31.0 30.0 - 36.5 g/dL    RDW 14.8 (H) 11.5 - 14.5 %    PLATELET 593 043 - 052 K/uL    NEUTROPHILS 69 32 - 75 %    LYMPHOCYTES 22 12 - 49 %    MONOCYTES 8 5 - 13 %    EOSINOPHILS 1 0 - 7 %    BASOPHILS 0 0 - 1 %    ABS. NEUTROPHILS 7.6 1.8 - 8.0 K/UL    ABS. LYMPHOCYTES 2.4 0.8 - 3.5 K/UL    ABS. MONOCYTES 0.9 0.0 - 1.0 K/UL    ABS. EOSINOPHILS 0.1 0.0 - 0.4 K/UL    ABS. BASOPHILS 0.0 0.0 - 0.1 K/UL       Assessment and Plan: Active Problems:    * No active hospital problems. *   IMPRESSION:  Previous decrease in fetal activity, resolved. Post lunch hyperglycemia, FSBS 121 here. PLAN:  Patient reassured. Keep appt on 2/9/17.         Signed By:  Bartolome Golden MD     February 5, 2017

## 2017-02-06 RX ORDER — GUAIFENESIN 100 MG/5ML
81 LIQUID (ML) ORAL DAILY
Qty: 30 TAB | Refills: 5 | Status: SHIPPED | OUTPATIENT
Start: 2017-02-06 | End: 2017-07-27 | Stop reason: SDUPTHER

## 2017-02-06 NOTE — TELEPHONE ENCOUNTER
Requested Prescriptions     Pending Prescriptions Disp Refills    aspirin 81 mg chewable tablet 30 Tab 5     Sig: Take 1 Tab by mouth daily.

## 2017-02-06 NOTE — TELEPHONE ENCOUNTER
Requested Prescriptions     Pending Prescriptions Disp Refills    aspirin 81 mg chewable tablet 30 Tab 5     Sig: Take 1 Tab by mouth daily.      Last office visit 1/18/17  Last filled  8/8/16  Changes made to medication on last visit  none

## 2017-02-14 ENCOUNTER — HOSPITAL ENCOUNTER (EMERGENCY)
Age: 32
Discharge: HOME | End: 2017-02-14
Attending: SPECIALIST | Admitting: SPECIALIST
Payer: MEDICAID

## 2017-02-14 VITALS
BODY MASS INDEX: 42.99 KG/M2 | DIASTOLIC BLOOD PRESSURE: 73 MMHG | TEMPERATURE: 97.7 F | SYSTOLIC BLOOD PRESSURE: 123 MMHG | HEART RATE: 79 BPM | RESPIRATION RATE: 18 BRPM | WEIGHT: 258 LBS | HEIGHT: 65 IN | OXYGEN SATURATION: 99 %

## 2017-02-14 LAB
ALBUMIN SERPL BCP-MCNC: 2.4 G/DL (ref 3.5–5)
ALBUMIN/GLOB SERPL: 0.6 {RATIO} (ref 1.1–2.2)
ALP SERPL-CCNC: 101 U/L (ref 45–117)
ALT SERPL-CCNC: 62 U/L (ref 12–78)
ANION GAP BLD CALC-SCNC: 9 MMOL/L (ref 5–15)
APPEARANCE UR: CLEAR
AST SERPL W P-5'-P-CCNC: 36 U/L (ref 15–37)
BACTERIA URNS QL MICRO: NEGATIVE /HPF
BASOPHILS # BLD AUTO: 0 K/UL (ref 0–0.1)
BASOPHILS # BLD: 0 % (ref 0–1)
BILIRUB SERPL-MCNC: 0.4 MG/DL (ref 0.2–1)
BILIRUB UR QL: NEGATIVE
BUN SERPL-MCNC: 5 MG/DL (ref 6–20)
BUN/CREAT SERPL: 11 (ref 12–20)
CALCIUM SERPL-MCNC: 8.5 MG/DL (ref 8.5–10.1)
CHLORIDE SERPL-SCNC: 107 MMOL/L (ref 97–108)
CO2 SERPL-SCNC: 23 MMOL/L (ref 21–32)
COLOR UR: ABNORMAL
CREAT SERPL-MCNC: 0.44 MG/DL (ref 0.55–1.02)
EOSINOPHIL # BLD: 0.1 K/UL (ref 0–0.4)
EOSINOPHIL NFR BLD: 1 % (ref 0–7)
EPITH CASTS URNS QL MICRO: ABNORMAL /LPF
ERYTHROCYTE [DISTWIDTH] IN BLOOD BY AUTOMATED COUNT: 15 % (ref 11.5–14.5)
GLOBULIN SER CALC-MCNC: 4.2 G/DL (ref 2–4)
GLUCOSE SERPL-MCNC: 80 MG/DL (ref 65–100)
GLUCOSE UR STRIP.AUTO-MCNC: NEGATIVE MG/DL
HCT VFR BLD AUTO: 31.6 % (ref 35–47)
HGB BLD-MCNC: 9.6 G/DL (ref 11.5–16)
HGB UR QL STRIP: NEGATIVE
HYALINE CASTS URNS QL MICRO: ABNORMAL /LPF (ref 0–5)
KETONES UR QL STRIP.AUTO: NEGATIVE MG/DL
LEUKOCYTE ESTERASE UR QL STRIP.AUTO: ABNORMAL
LYMPHOCYTES # BLD AUTO: 24 % (ref 12–49)
LYMPHOCYTES # BLD: 2.7 K/UL (ref 0.8–3.5)
MCH RBC QN AUTO: 22.6 PG (ref 26–34)
MCHC RBC AUTO-ENTMCNC: 30.4 G/DL (ref 30–36.5)
MCV RBC AUTO: 74.5 FL (ref 80–99)
MONOCYTES # BLD: 0.8 K/UL (ref 0–1)
MONOCYTES NFR BLD AUTO: 7 % (ref 5–13)
NEUTS SEG # BLD: 7.6 K/UL (ref 1.8–8)
NEUTS SEG NFR BLD AUTO: 68 % (ref 32–75)
NITRITE UR QL STRIP.AUTO: NEGATIVE
PH UR STRIP: 7 [PH] (ref 5–8)
PLATELET # BLD AUTO: 300 K/UL (ref 150–400)
POTASSIUM SERPL-SCNC: 3.8 MMOL/L (ref 3.5–5.1)
PROT SERPL-MCNC: 6.6 G/DL (ref 6.4–8.2)
PROT UR STRIP-MCNC: NEGATIVE MG/DL
RBC # BLD AUTO: 4.24 M/UL (ref 3.8–5.2)
RBC #/AREA URNS HPF: ABNORMAL /HPF (ref 0–5)
RBC MORPH BLD: ABNORMAL
RBC MORPH BLD: ABNORMAL
SODIUM SERPL-SCNC: 139 MMOL/L (ref 136–145)
SP GR UR REFRACTOMETRY: 1.02 (ref 1–1.03)
UA: UC IF INDICATED,UAUC: ABNORMAL
UROBILINOGEN UR QL STRIP.AUTO: 1 EU/DL (ref 0.2–1)
WBC # BLD AUTO: 11.2 K/UL (ref 3.6–11)
WBC MORPH BLD: ABNORMAL
WBC URNS QL MICRO: ABNORMAL /HPF (ref 0–4)

## 2017-02-14 PROCEDURE — 36415 COLL VENOUS BLD VENIPUNCTURE: CPT | Performed by: SPECIALIST

## 2017-02-14 PROCEDURE — 74011250636 HC RX REV CODE- 250/636: Performed by: SPECIALIST

## 2017-02-14 PROCEDURE — 96360 HYDRATION IV INFUSION INIT: CPT

## 2017-02-14 PROCEDURE — 85025 COMPLETE CBC W/AUTO DIFF WBC: CPT | Performed by: SPECIALIST

## 2017-02-14 PROCEDURE — 59025 FETAL NON-STRESS TEST: CPT

## 2017-02-14 PROCEDURE — 81001 URINALYSIS AUTO W/SCOPE: CPT | Performed by: SPECIALIST

## 2017-02-14 PROCEDURE — 80053 COMPREHEN METABOLIC PANEL: CPT | Performed by: SPECIALIST

## 2017-02-14 RX ORDER — SODIUM CHLORIDE 9 MG/ML
250 INJECTION, SOLUTION INTRAVENOUS CONTINUOUS
Status: DISCONTINUED | OUTPATIENT
Start: 2017-02-14 | End: 2017-02-14

## 2017-02-14 RX ADMIN — SODIUM CHLORIDE 1000 ML: 900 INJECTION, SOLUTION INTRAVENOUS at 15:00

## 2017-02-14 NOTE — PROGRESS NOTES
reviewed discharge instructions with the patient. The patient verbalized understanding. All questions and concerns were addressed. The patient declined a wheelchair and is discharged ambulatory in the care of family members with instructions and prescriptions in hand. Pt is alert and oriented x 4. Respirations are clear and unlabored. No c/o abdominal pain at this pain.

## 2017-02-14 NOTE — PROGRESS NOTES
Pt arrived ambulatory, [facility]. Pt states diarrhea intermittently throughout pregnancy, was particularly bad through the night. Pt states brown in color watery, denies bloody or black stools, denies vomiting, states some nausea, denies leaking of fluids and vaginal bleeding, states irregular contractions felt last night and today. Dr. Kya Torres sent pt to L&D unit for labs and IV hydration.     1552: IV Bolus completed, pt states she is feeling much better, Dr. Kya Torres spoke with charge nurse, pt is ok to be dc home

## 2017-02-14 NOTE — DISCHARGE INSTRUCTIONS
Follow up in OB Office with scheduled appointment      Belly Pain in Pregnancy: Care Instructions  Your Care Instructions  When you're pregnant, any belly pain can be a worry. You may not want to call your doctor about every pain you have. But you don't want to miss something that is dangerous for you or your baby. Even if it feels familiar, belly pain can mean something new when you're pregnant. It's important to know when to call your doctor. It will also help to know how to care for yourself at home when your pain is not caused by anything harmful. · When belly pain is more severe or constant, see a doctor right away. · If you're sure your belly pain is a sign of labor, call your doctor. · When belly pain is brief, it's usually a normal part of pregnancy. It might be related to changes in the growing uterus. Or it could be the stretching of ligaments called round ligaments. These ligaments help support the uterus. Round ligament pain can be on either side of your belly. It can also be felt in your hips or groin. Follow-up care is a key part of your treatment and safety. Be sure to make and go to all appointments, and call your doctor if you are having problems. It's also a good idea to know your test results and keep a list of the medicines you take. How can you tell if belly pain is a sign of labor? When belly pain is caused by labor, it can feel like mild or menstrual-like cramps in your lower belly. These cramps are probably contractions. They can happen in your second or third trimester. You may also have:  · A steady, dull ache in your lower back, pelvis, or thighs. · A feeling of pressure in your pelvis or lower belly. · Changes in your vaginal discharge or a sudden release of fluid from the vagina. If you think you are in labor, call your doctor. How can you care for yourself at home? When belly pain is mild and is not a symptom of labor:  · Rest until you feel better.   · Take a warm bath.  · Think about what you drink and eat:  ¨ Drink plenty of fluids. Choose water and other caffeine-free clear liquids until you feel better. ¨ Try eating small, frequent meals. If your stomach is upset, try bland, low-fat foods like plain rice, broiled chicken, toast, and yogurt. · Think about how you move if you are having brief pains from stretching of the round ligaments. ¨ Try gentle stretching. ¨ Move a little more slowly when turning in bed or getting up from a chair, so those ligaments don't stretch quickly. ¨ Lean forward a bit if you think you are going to cough or sneeze. When should you call for help? Call 911 anytime you think you may need emergency care. For example, call if:  · You have sudden, severe pain in your belly. · You have severe vaginal bleeding. Call your doctor now or seek immediate medical care if:  · You have new or worse belly pain or cramping. · You have any vaginal bleeding. · You have a fever. · You have symptoms of preeclampsia, such as:  ¨ Sudden swelling of your face, hands, or feet. ¨ New vision problems (such as dimness or blurring). ¨ A severe headache. · You think that you may be in labor. This means that you've had at least 8 contractions within 1 hour or at least 4 contractions within 20 minutes, even after you change your position and drink fluids. · You have symptoms of a urinary tract infection. These may include:  ¨ Pain or burning when you urinate. ¨ A frequent need to urinate without being able to pass much urine. ¨ Pain in the flank, which is just below the rib cage and above the waist on either side of the back. ¨ Blood in your urine. Watch closely for changes in your health, and be sure to contact your doctor if you are worried about your or your baby's health. Where can you learn more? Go to http://sheree-frank.info/.   Enter [id number] in the search box to learn more about \"Belly Pain in Pregnancy: Care Instructions. \"  Current as of: 2016  Content Version: 11.1  © 5849-2712 [employer]. Care instructions adapted under license by [employer] (which disclaims liability or warranty for this information). If you have questions about a medical condition or this instruction, always ask your healthcare professional. [address] any warranty or liability for your use of this information. Weeks 34 to 36 of Your Pregnancy: Care Instructions  Your Care Instructions    By now, your baby and your belly have grown quite large. It is almost time to give birth. A full-term pregnancy can deliver between 37 and 42 weeks. Your baby's lungs are almost ready to breathe air. The bones in your baby's head are now firm enough to protect it, but soft enough to move down through the birth canal.  You may feel excited, happy, anxious, or scared. You may wonder how you will know if you are in labor or what to expect during labor. Try to be flexible in your expectations of the birth. Because each birth is different, there is no way to know exactly what childbirth will be like for you. This care sheet will help you know what to expect and how to prepare. This may make your childbirth easier. If you haven't already had the Tdap shot during this pregnancy, talk to your doctor about getting it. It will help protect your  against pertussis infection. In the 36th week, most women have a test for group B streptococcus (GBS). GBS is a common bacteria that can live in the vagina and rectum. It can make your baby sick after birth. If you test positive, you will get antibiotics during labor. The medicine will keep your baby from getting the bacteria. Follow-up care is a key part of your treatment and safety. Be sure to make and go to all appointments, and call your doctor if you are having problems.  It's also a good idea to know your test results and keep a list of the medicines you take.  How can you care for yourself at home? Learn about pain relief choices  · Pain is different for every woman. Talk with your doctor about your feelings about pain. · You can choose from several types of pain relief. These include medicine or breathing techniques, as well as comfort measures. You can use more than one option. · If you choose to have pain medicine during labor, talk to your doctor about your options. Some medicines lower anxiety and help with some of the pain. Others make your lower body numb so that you won't feel pain. · Be sure to tell your doctor about your pain medicine choice before you start labor or very early in your labor. You may be able to change your mind as labor progresses. · Rarely, a woman is put to sleep by medicine given through a mask or an IV. Labor and delivery  · The first stage of labor has three parts: early, active, and transition. ¨ Most women have early labor at home. You can stay busy or rest, eat light snacks, drink clear fluids, and start counting contractions. ¨ When talking during a contraction gets hard, you may be moving to active labor. During active labor, you should head for the hospital if you are not there already. ¨ You are in active labor when contractions come every 3 to 4 minutes and last about 60 seconds. Your cervix is opening more rapidly. ¨ If your water breaks, contractions will come faster and stronger. ¨ During transition, your cervix is stretching, and contractions are coming more rapidly. ¨ You may want to push, but your cervix might not be ready. Your doctor will tell you when to push. · The second stage starts when your cervix is completely opened and you are ready to push. ¨ Contractions are very strong to push the baby down the birth canal.  ¨ You will feel the urge to push. You may feel like you need to have a bowel movement. ¨ You may be coached to push with contractions.  These contractions will be very strong, but you will not have them as often. You can get a little rest between contractions. ¨ You may be emotional and irritable. You may not be aware of what is going on around you. ¨ One last push, and your baby is born. · The third stage is when a few more contractions push out the placenta. This may take 30 minutes or less. · The fourth stage is the welcome recovery. You may feel overwhelmed with emotions and exhausted but alert. This is a good time to start breastfeeding. Where can you learn more? Go to http://sheree-frank.info/. Enter W912 in the search box to learn more about \"Weeks 34 to 36 of Your Pregnancy: Care Instructions. \"  Current as of: May 30, 2016  Content Version: 11.1  © 2416-8725 [employer]. Care instructions adapted under license by [employer] (which disclaims liability or warranty for this information). If you have questions about a medical condition or this instruction, always ask your healthcare professional. [place] [address] any warranty or liability for your use of this information.

## 2017-02-14 NOTE — IP AVS SNAPSHOT
[address] [facility] 
[phone] Patient: Anika Broussard MRN: [id number] :[id number] You are allergic to the following Allergen Reactions Mirapex (Pramipexole) Drowsiness Morphine Rash Tramadol Other (comments)  
 headache Recent Documentation Height Weight BMI OB Status Smoking Status 1.651 m 117 kg 42.93 kg/m2 Pregnant Current Every Day Smoker Unresulted Labs Order Current Status SAMPLE TO BLOOD BANK In process CBC WITH AUTOMATED DIFF Preliminary result Emergency Contacts Name Discharge Info Relation Home Work Mobile Scooter Warner DISCHARGE CAREGIVER [3]    [phone] About your hospitalization You were admitted on:  N/A You last received care in the:  [facility] 3 LD TRIAGE You were discharged on:  2017 Unit phone number:  [phone] Why you were hospitalized Your primary diagnosis was:  Not on File Providers Seen During Your Hospitalizations Provider Role Specialty Primary office phone Taylor Sherwood MD Attending Provider Obstetrics & Gynecology [phone] Your Primary Care Physician (PCP) Primary Care Physician Office Phone Office Fax Christyne Alert [phone] Follow-up Information Follow up With Details Comments Contact Info Cary Villatoro MD   [address] [place] [address] Primary Care [address] [place] [address] 
[phone] Your Appointments 2017  9:30 AM EST Follow Up with Lucy Hernández MD  
[facility] [address] [facility]) [address] [address] [phone] Current Discharge Medication List  
  
ASK your doctor about these medications Dose & Instructions Dispensing Information Comments Morning Noon Evening Bedtime * albuterol 90 mcg/actuation inhaler Commonly known as:  PROAIR HFA Your next dose is: Today, Tomorrow Other:  _________ Dose:  2 Puff Take 2 Puffs by inhalation every four (4) hours as needed for Wheezing. Quantity:  1 Inhaler Refills:  5  
     
   
   
   
  
 * albuterol 2.5 mg /3 mL (0.083 %) nebulizer solution Commonly known as:  PROVENTIL VENTOLIN Your next dose is: Today, Tomorrow Other:  _________ Dose:  2.5 mg  
3 mL by Nebulization route every four (4) hours as needed for Wheezing. Quantity:  100 Each Refills:  5  
     
   
   
   
  
 aspirin 81 mg chewable tablet Your next dose is: Today, Tomorrow Other:  _________ Dose:  81 mg Take 1 Tab by mouth daily. Quantity:  30 Tab Refills:  5 Blood-Glucose Meter monitoring kit Your next dose is: Today, Tomorrow Other:  _________ Use up to three times per days Quantity:  1 Kit Refills:  0  
     
   
   
   
  
 diphenoxylate-atropine 2.5-0.025 mg per tablet Commonly known as:  LOMOTIL Your next dose is: Today, Tomorrow Other:  _________ Dose:  1 Tab Take 1 Tab by mouth four (4) times daily as needed for Diarrhea. Max Daily Amount: 4 Tabs. Quantity:  20 Tab Refills:  0  
     
   
   
   
  
 glucose blood VI test strips strip Commonly known as:  ASCENSIA AUTODISC VI, ONE TOUCH ULTRA TEST VI Your next dose is: Today, Tomorrow Other:  _________ Use up to four times daily Quantity:  100 Strip Refills:  5  
     
   
   
   
  
 glyBURIDE 1.25 mg tablet Commonly known as:  Edilberto Garciaudsen Your next dose is: Today, Tomorrow Other:  _________ Take 1/2 pill if you have an evening snack Quantity:  30 Tab Refills:  3  
     
   
   
   
  
 labetalol 100 mg tablet Commonly known as:  Jm Shanae Your next dose is: Today, Tomorrow Other:  _________ Dose:  100 mg Take 100 mg by mouth two (2) times a day. Refills:  0 Lancets Misc Your next dose is: Today, Tomorrow Other:  _________ Use up to three times per days;  
 Quantity:  1 Each Refills:  11  
     
   
   
   
  
 metFORMIN 1,000 mg tablet Commonly known as:  GLUCOPHAGE Your next dose is: Today, Tomorrow Other:  _________ Dose:  500 mg Take 0.5 Tabs by mouth two (2) times a day. Quantity:  60 Tab Refills:  5 PEN NEEDLE 31 gauge x 5/16\" Ndle Generic drug:  Insulin Needles (Disposable) Your next dose is: Today, Tomorrow Other:  _________ Refills:  0  
     
   
   
   
  
 prenatal vitamin 27 mg iron- 800 mcg Tab tablet Your next dose is: Today, Tomorrow Other:  _________  
   
   
 daily. Refills:  0 SITagliptin 100 mg tablet Commonly known as:  Daksha Pipe Your next dose is: Today, Tomorrow Other:  _________ Dose:  100 mg Take 1 Tab by mouth daily. Quantity:  30 Tab Refills:  5  
     
   
   
   
  
 * Notice: This list has 2 medication(s) that are the same as other medications prescribed for you. Read the directions carefully, and ask your doctor or other care provider to review them with you. Discharge Instructions Follow up in [facility] Office with scheduled appointment Belly Pain in Pregnancy: Care Instructions Your Care Instructions When you're pregnant, any belly pain can be a worry. You may not want to call your doctor about every pain you have. But you don't want to miss something that is dangerous for you or your baby. Even if it feels familiar, belly pain can mean something new when you're pregnant. It's important to know when to call your doctor.  It will also help to know how to care for yourself at home when your pain is not caused by anything harmful. · When belly pain is more severe or constant, see a doctor right away. · If you're sure your belly pain is a sign of labor, call your doctor. · When belly pain is brief, it's usually a normal part of pregnancy. It might be related to changes in the growing uterus. Or it could be the stretching of ligaments called round ligaments. These ligaments help support the uterus. Round ligament pain can be on either side of your belly. It can also be felt in your hips or groin. Follow-up care is a key part of your treatment and safety. Be sure to make and go to all appointments, and call your doctor if you are having problems. It's also a good idea to know your test results and keep a list of the medicines you take. How can you tell if belly pain is a sign of labor? When belly pain is caused by labor, it can feel like mild or menstrual-like cramps in your lower belly. These cramps are probably contractions. They can happen in your second or third trimester. You may also have: · A steady, dull ache in your lower back, pelvis, or thighs. · A feeling of pressure in your pelvis or lower belly. · Changes in your vaginal discharge or a sudden release of fluid from the vagina. If you think you are in labor, call your doctor. How can you care for yourself at home? When belly pain is mild and is not a symptom of labor: · Rest until you feel better. · Take a warm bath. · Think about what you drink and eat: ¨ Drink plenty of fluids. Choose water and other caffeine-free clear liquids until you feel better. ¨ Try eating small, frequent meals. If your stomach is upset, try bland, low-fat foods like plain rice, broiled chicken, toast, and yogurt. · Think about how you move if you are having brief pains from stretching of the round ligaments. ¨ Try gentle stretching.  
¨ Move a little more slowly when turning in bed or getting up from a chair, so those ligaments don't stretch quickly. ¨ Lean forward a bit if you think you are going to cough or sneeze. When should you call for help? Call 911 anytime you think you may need emergency care. For example, call if: 
· You have sudden, severe pain in your belly. · You have severe vaginal bleeding. Call your doctor now or seek immediate medical care if: 
· You have new or worse belly pain or cramping. · You have any vaginal bleeding. · You have a fever. · You have symptoms of preeclampsia, such as: 
¨ Sudden swelling of your face, hands, or feet. ¨ New vision problems (such as dimness or blurring). ¨ A severe headache. · You think that you may be in labor. This means that you've had at least 8 contractions within 1 hour or at least 4 contractions within 20 minutes, even after you change your position and drink fluids. · You have symptoms of a urinary tract infection. These may include: 
¨ Pain or burning when you urinate. ¨ A frequent need to urinate without being able to pass much urine. ¨ Pain in the flank, which is just below the rib cage and above the waist on either side of the back. ¨ Blood in your urine. Watch closely for changes in your health, and be sure to contact your doctor if you are worried about your or your baby's health. Where can you learn more? Go to http://sheree-frank.info/. Enter 593 249 212 in the search box to learn more about \"Belly Pain in Pregnancy: Care Instructions. \" Current as of: June 8, 2016 Content Version: 11.1 © 0602-2316 [employer]. Care instructions adapted under license by [employer] (which disclaims liability or warranty for this information). If you have questions about a medical condition or this instruction, always ask your healthcare professional. [place] [address] any warranty or liability for your use of this information. Weeks 34 to 36 of Your Pregnancy: Care Instructions Your Care Instructions By now, your baby and your belly have grown quite large. It is almost time to give birth. A full-term pregnancy can deliver between 37 and 42 weeks. Your baby's lungs are almost ready to breathe air. The bones in your baby's head are now firm enough to protect it, but soft enough to move down through the birth canal. 
You may feel excited, happy, anxious, or scared. You may wonder how you will know if you are in labor or what to expect during labor. Try to be flexible in your expectations of the birth. Because each birth is different, there is no way to know exactly what childbirth will be like for you. This care sheet will help you know what to expect and how to prepare. This may make your childbirth easier. If you haven't already had the Tdap shot during this pregnancy, talk to your doctor about getting it. It will help protect your  against pertussis infection. In the 36th week, most women have a test for group B streptococcus (GBS). GBS is a common bacteria that can live in the vagina and rectum. It can make your baby sick after birth. If you test positive, you will get antibiotics during labor. The medicine will keep your baby from getting the bacteria. Follow-up care is a key part of your treatment and safety. Be sure to make and go to all appointments, and call your doctor if you are having problems. It's also a good idea to know your test results and keep a list of the medicines you take. How can you care for yourself at home? Learn about pain relief choices · Pain is different for every woman. Talk with your doctor about your feelings about pain. · You can choose from several types of pain relief. These include medicine or breathing techniques, as well as comfort measures. You can use more than one option. · If you choose to have pain medicine during labor, talk to your doctor about your options.  Some medicines lower anxiety and help with some of the pain. Others make your lower body numb so that you won't feel pain. · Be sure to tell your doctor about your pain medicine choice before you start labor or very early in your labor. You may be able to change your mind as labor progresses. · Rarely, a woman is put to sleep by medicine given through a mask or an IV. Labor and delivery · The first stage of labor has three parts: early, active, and transition. ¨ Most women have early labor at home. You can stay busy or rest, eat light snacks, drink clear fluids, and start counting contractions. ¨ When talking during a contraction gets hard, you may be moving to active labor. During active labor, you should head for the hospital if you are not there already. ¨ You are in active labor when contractions come every 3 to 4 minutes and last about 60 seconds. Your cervix is opening more rapidly. ¨ If your water breaks, contractions will come faster and stronger. ¨ During transition, your cervix is stretching, and contractions are coming more rapidly. ¨ You may want to push, but your cervix might not be ready. Your doctor will tell you when to push. · The second stage starts when your cervix is completely opened and you are ready to push. ¨ Contractions are very strong to push the baby down the birth canal. 
¨ You will feel the urge to push. You may feel like you need to have a bowel movement. ¨ You may be coached to push with contractions. These contractions will be very strong, but you will not have them as often. You can get a little rest between contractions. ¨ You may be emotional and irritable. You may not be aware of what is going on around you. ¨ One last push, and your baby is born. · The third stage is when a few more contractions push out the placenta. This may take 30 minutes or less. · The fourth stage is the welcome recovery. You may feel overwhelmed with emotions and exhausted but alert. This is a good time to start breastfeeding. Where can you learn more? Go to http://sheree-frank.info/. Enter W501 in the search box to learn more about \"Weeks 34 to 36 of Your Pregnancy: Care Instructions. \" Current as of: May 30, 2016 Content Version: 11.1 © 8497-8605 [employer]. Care instructions adapted under license by [employer] (which disclaims liability or warranty for this information). If you have questions about a medical condition or this instruction, always ask your healthcare professional. [address] any warranty or liability for your use of this information. Discharge Orders None Introducing [facility]! [facility] introduces Quri patient portal. Now you can access parts of your medical record, email your doctor's office, and request medication refills online. 1. In your internet browser, go to [email or web address] 2. Click on the First Time User? Click Here link in the Sign In box. You will see the New Member Sign Up page. 3. Enter your Quri Access Code exactly as it appears below. You will not need to use this code after youve completed the sign-up process. If you do not sign up before the expiration date, you must request a new code. · Quri Access Code: [email or web address] Expires: 5/15/2017  4:02 PM 
 
4. Enter the last four digits of your Social Security Number (xxxx) and Date of Birth (mm/dd/yyyy) as indicated and click Submit. You will be taken to the next sign-up page. 5. Create a Octoshapet ID. This will be your Quri login ID and cannot be changed, so think of one that is secure and easy to remember. 6. Create a Quri password. You can change your password at any time. 7. Enter your Password Reset Question and Answer. This can be used at a later time if you forget your password. 8. Enter your e-mail address. You will receive e-mail notification when new information is available in [address] 9. Click Sign Up. You can now view and download portions of your medical record. 10. Click the Download Summary menu link to download a portable copy of your medical information. If you have questions, please visit the Frequently Asked Questions section of the prettysecrets website. Remember, prettysecrets is NOT to be used for urgent needs. For medical emergencies, dial 911. Now available from your iPhone and Android! General Information Please provide this summary of care documentation to your next provider. Patient Signature:  ____________________________________________________________ Date:  ____________________________________________________________  
  
Jasmin Kay Provider Signature:  ____________________________________________________________ Date:  ____________________________________________________________

## 2017-02-14 NOTE — IP AVS SNAPSHOT
Summary of Care Report The Summary of Care report has been created to help improve care coordination. Users with access to [employer] or [employer] [address] (Web-based application) may access additional patient information including the Discharge Summary. If you are not currently a [address] user and need more information, please call the number listed below in the [place] [address] section and ask to be connected with Medical Records. Facility Information Name Address Phone [address] [address] [phone] Patient Information Patient Name Sex BOSTON Amos ([id number]) Female 1985 Discharge Information Admitting Provider Service Area Unit Carlitos Aleman MD / [phone] Amber Driver [facility]  Triage / [phone] Discharge Provider Discharge Date/Time Discharge Disposition Destination (none) 2017 15:51 (Pending) AHR (none) Patient Language Language ENGLISH [13] Problem List as of 2017  Date Reviewed: 2017 Codes Priority Class Noted - Resolved Hyperglycemia due to type 2 diabetes mellitus ([facility] ICD-10-CM: E11.65 ICD-9-CM: 250.00   3/23/2016 - Present Hypertension ICD-10-CM: I10 
ICD-9-CM: 401.9   3/23/2016 - Present Asthma ICD-10-CM: N58.884 ICD-9-CM: 493.90   3/23/2016 - Present Bleeding nose ICD-10-CM: R04.0 ICD-9-CM: 784.7   2017 - Present Back pain in pregnancy ICD-10-CM: O26.899 ICD-9-CM: 646.80, 724.5  Present on Admission 2017 - Present Diarrhea, unspecified ICD-10-CM: R19.7 ICD-9-CM: 787.91  Present on Admission 2017 - Present Decreased fetal movement during pregnancy in third trimester, antepartum ICD-10-CM: A81.4820 ICD-9-CM: 655.73  Present on Admission 2017 - Present You are allergic to the following Allergen Reactions Mirapex (Pramipexole) Drowsiness Morphine Rash Tramadol Other (comments)  
 headache Current Discharge Medication List  
  
ASK your doctor about these medications Dose & Instructions Dispensing Information Comments * albuterol 90 mcg/actuation inhaler Commonly known as:  PROAIR HFA Dose:  2 Puff Take 2 Puffs by inhalation every four (4) hours as needed for Wheezing. Quantity:  1 Inhaler Refills:  5  
   
 * albuterol 2.5 mg /3 mL (0.083 %) nebulizer solution Commonly known as:  PROVENTIL VENTOLIN Dose:  2.5 mg  
3 mL by Nebulization route every four (4) hours as needed for Wheezing. Quantity:  100 Each Refills:  5  
   
 aspirin 81 mg chewable tablet Dose:  81 mg Take 1 Tab by mouth daily. Quantity:  30 Tab Refills:  5 Blood-Glucose Meter monitoring kit Use up to three times per days Quantity:  1 Kit Refills:  0  
   
 diphenoxylate-atropine 2.5-0.025 mg per tablet Commonly known as:  LOMOTIL Dose:  1 Tab Take 1 Tab by mouth four (4) times daily as needed for Diarrhea. Max Daily Amount: 4 Tabs. Quantity:  20 Tab Refills:  0  
   
 glucose blood VI test strips strip Commonly known as:  ASCENSIA AUTODISC VI, ONE TOUCH ULTRA TEST VI  
 Use up to four times daily Quantity:  100 Strip Refills:  5  
   
 glyBURIDE 1.25 mg tablet Commonly known as:  Radha Tayla Take 1/2 pill if you have an evening snack Quantity:  30 Tab Refills:  3  
   
 labetalol 100 mg tablet Commonly known as:  Suella Cosier Dose:  100 mg Take 100 mg by mouth two (2) times a day. Refills:  0 Lancets Misc Use up to three times per days;  
 Quantity:  1 Each Refills:  11  
   
 metFORMIN 1,000 mg tablet Commonly known as:  GLUCOPHAGE Dose:  500 mg Take 0.5 Tabs by mouth two (2) times a day. Quantity:  60 Tab Refills:  5 PEN NEEDLE 31 gauge x 5/16\" Ndle Generic drug:  Insulin Needles (Disposable) Refills:  0  
   
 prenatal vitamin 27 mg iron- 800 mcg Tab tablet  
 daily. Refills:  0 SITagliptin 100 mg tablet Commonly known as:  Judeth Blank Dose:  100 mg Take 1 Tab by mouth daily. Quantity:  30 Tab Refills:  5  
   
 * Notice: This list has 2 medication(s) that are the same as other medications prescribed for you. Read the directions carefully, and ask your doctor or other care provider to review them with you. Current Immunizations Name Date Influenza Vaccine 10/1/2016 Tdap 7/6/2016 Follow-up Information Follow up With Details Comments Contact Info Daksha Sultana MD   [facility] [address] [place] [address] 
[phone] Discharge Instructions Follow up in [facility] Office with scheduled appointment Belly Pain in Pregnancy: Care Instructions Your Care Instructions When you're pregnant, any belly pain can be a worry. You may not want to call your doctor about every pain you have. But you don't want to miss something that is dangerous for you or your baby. Even if it feels familiar, belly pain can mean something new when you're pregnant. It's important to know when to call your doctor. It will also help to know how to care for yourself at home when your pain is not caused by anything harmful. · When belly pain is more severe or constant, see a doctor right away. · If you're sure your belly pain is a sign of labor, call your doctor. · When belly pain is brief, it's usually a normal part of pregnancy. It might be related to changes in the growing uterus. Or it could be the stretching of ligaments called round ligaments. These ligaments help support the uterus. Round ligament pain can be on either side of your belly. It can also be felt in your hips or groin. Follow-up care is a key part of your treatment and safety.  Be sure to make and go to all appointments, and call your doctor if you are having problems. It's also a good idea to know your test results and keep a list of the medicines you take. How can you tell if belly pain is a sign of labor? When belly pain is caused by labor, it can feel like mild or menstrual-like cramps in your lower belly. These cramps are probably contractions. They can happen in your second or third trimester. You may also have: · A steady, dull ache in your lower back, pelvis, or thighs. · A feeling of pressure in your pelvis or lower belly. · Changes in your vaginal discharge or a sudden release of fluid from the vagina. If you think you are in labor, call your doctor. How can you care for yourself at home? When belly pain is mild and is not a symptom of labor: · Rest until you feel better. · Take a warm bath. · Think about what you drink and eat: ¨ Drink plenty of fluids. Choose water and other caffeine-free clear liquids until you feel better. ¨ Try eating small, frequent meals. If your stomach is upset, try bland, low-fat foods like plain rice, broiled chicken, toast, and yogurt. · Think about how you move if you are having brief pains from stretching of the round ligaments. ¨ Try gentle stretching. ¨ Move a little more slowly when turning in bed or getting up from a chair, so those ligaments don't stretch quickly. ¨ Lean forward a bit if you think you are going to cough or sneeze. When should you call for help? Call 911 anytime you think you may need emergency care. For example, call if: 
· You have sudden, severe pain in your belly. · You have severe vaginal bleeding. Call your doctor now or seek immediate medical care if: 
· You have new or worse belly pain or cramping. · You have any vaginal bleeding. · You have a fever. · You have symptoms of preeclampsia, such as: 
¨ Sudden swelling of your face, hands, or feet. ¨ New vision problems (such as dimness or blurring). ¨ A severe headache. · You think that you may be in labor. This means that you've had at least 8 contractions within 1 hour or at least 4 contractions within 20 minutes, even after you change your position and drink fluids. · You have symptoms of a urinary tract infection. These may include: 
¨ Pain or burning when you urinate. ¨ A frequent need to urinate without being able to pass much urine. ¨ Pain in the flank, which is just below the rib cage and above the waist on either side of the back. ¨ Blood in your urine. Watch closely for changes in your health, and be sure to contact your doctor if you are worried about your or your baby's health. Where can you learn more? Go to http://sheree-frank.info/. Enter [phone] in the search box to learn more about \"Belly Pain in Pregnancy: Care Instructions. \" Current as of: June 8, 2016 Content Version: 11.1 © 1962-3320 [employer]. Care instructions adapted under license by [employer] (which disclaims liability or warranty for this information). If you have questions about a medical condition or this instruction, always ask your healthcare professional. [place] [address] any warranty or liability for your use of this information. Weeks 34 to 36 of Your Pregnancy: Care Instructions Your Care Instructions By now, your baby and your belly have grown quite large. It is almost time to give birth. A full-term pregnancy can deliver between 37 and 42 weeks. Your baby's lungs are almost ready to breathe air. The bones in your baby's head are now firm enough to protect it, but soft enough to move down through the birth canal. 
You may feel excited, happy, anxious, or scared. You may wonder how you will know if you are in labor or what to expect during labor. Try to be flexible in your expectations of the birth.  Because each birth is different, there is no way to know exactly what childbirth will be like for you. This care sheet will help you know what to expect and how to prepare. This may make your childbirth easier. If you haven't already had the Tdap shot during this pregnancy, talk to your doctor about getting it. It will help protect your  against pertussis infection. In the 36th week, most women have a test for group B streptococcus (GBS). GBS is a common bacteria that can live in the vagina and rectum. It can make your baby sick after birth. If you test positive, you will get antibiotics during labor. The medicine will keep your baby from getting the bacteria. Follow-up care is a key part of your treatment and safety. Be sure to make and go to all appointments, and call your doctor if you are having problems. It's also a good idea to know your test results and keep a list of the medicines you take. How can you care for yourself at home? Learn about pain relief choices · Pain is different for every woman. Talk with your doctor about your feelings about pain. · You can choose from several types of pain relief. These include medicine or breathing techniques, as well as comfort measures. You can use more than one option. · If you choose to have pain medicine during labor, talk to your doctor about your options. Some medicines lower anxiety and help with some of the pain. Others make your lower body numb so that you won't feel pain. · Be sure to tell your doctor about your pain medicine choice before you start labor or very early in your labor. You may be able to change your mind as labor progresses. · Rarely, a woman is put to sleep by medicine given through a mask or an IV. Labor and delivery · The first stage of labor has three parts: early, active, and transition. ¨ Most women have early labor at home. You can stay busy or rest, eat light snacks, drink clear fluids, and start counting contractions.  
¨ When talking during a contraction gets hard, you may be moving to active labor. During active labor, you should head for the hospital if you are not there already. ¨ You are in active labor when contractions come every 3 to 4 minutes and last about 60 seconds. Your cervix is opening more rapidly. ¨ If your water breaks, contractions will come faster and stronger. ¨ During transition, your cervix is stretching, and contractions are coming more rapidly. ¨ You may want to push, but your cervix might not be ready. Your doctor will tell you when to push. · The second stage starts when your cervix is completely opened and you are ready to push. ¨ Contractions are very strong to push the baby down the birth canal. 
¨ You will feel the urge to push. You may feel like you need to have a bowel movement. ¨ You may be coached to push with contractions. These contractions will be very strong, but you will not have them as often. You can get a little rest between contractions. ¨ You may be emotional and irritable. You may not be aware of what is going on around you. ¨ One last push, and your baby is born. · The third stage is when a few more contractions push out the placenta. This may take 30 minutes or less. · The fourth stage is the welcome recovery. You may feel overwhelmed with emotions and exhausted but alert. This is a good time to start breastfeeding. Where can you learn more? Go to http://sheree-frank.info/. Enter A382 in the search box to learn more about \"Weeks 34 to 36 of Your Pregnancy: Care Instructions. \" Current as of: May 30, 2016 Content Version: 11.1 © 3119-5011 [employer]. Care instructions adapted under license by [employer] (which disclaims liability or warranty for this information). If you have questions about a medical condition or this instruction, always ask your healthcare professional. [address] any warranty or liability for your use of this information. Chart Review Routing History Recipient Method Report Sent By Jose L Stanford MD  
Phone: [phone] In [address] [place] [address] MD Anthony [[id number]] 8/23/2016  8:51 AM   
 Provider Unknown, MD  
Patient not available to ask [address] IP Auto Routed Notes Duglas Cabrera MD [[id number]] 1/6/2017  8:33 AM 01/06/2017 Provider Unknown, MD  
Patient not available to ask [address] IP Auto Routed Notes Benedicto Duron MD [[id number]] 1/13/2017  8:18 AM 01/13/2017 Shannan Felipe MD  
Phone: [phone] In Basket IP Auto Routed Notes Tatyana Holley MD [[id number]] 1/30/2017  3:07 AM 01/30/2017 Shannan Felipe MD  
Phone: [phone] In Basket IP Auto Routed Notes Tatyana Holley MD [[id number]] 2/5/2017  6:29 PM 02/05/2017

## 2017-02-14 NOTE — IP AVS SNAPSHOT
Current Discharge Medication List  
  
ASK your doctor about these medications Dose & Instructions Dispensing Information Comments Morning Noon Evening Bedtime * albuterol 90 mcg/actuation inhaler Commonly known as:  PROAIR HFA Your next dose is: Today, Tomorrow Other:  ____________ Dose:  2 Puff Take 2 Puffs by inhalation every four (4) hours as needed for Wheezing. Quantity:  1 Inhaler Refills:  5  
     
   
   
   
  
 * albuterol 2.5 mg /3 mL (0.083 %) nebulizer solution Commonly known as:  PROVENTIL VENTOLIN Your next dose is: Today, Tomorrow Other:  ____________ Dose:  2.5 mg  
3 mL by Nebulization route every four (4) hours as needed for Wheezing. Quantity:  100 Each Refills:  5  
     
   
   
   
  
 aspirin 81 mg chewable tablet Your next dose is: Today, Tomorrow Other:  ____________ Dose:  81 mg Take 1 Tab by mouth daily. Quantity:  30 Tab Refills:  5 Blood-Glucose Meter monitoring kit Your next dose is: Today, Tomorrow Other:  ____________ Use up to three times per days Quantity:  1 Kit Refills:  0  
     
   
   
   
  
 diphenoxylate-atropine 2.5-0.025 mg per tablet Commonly known as:  LOMOTIL Your next dose is: Today, Tomorrow Other:  ____________ Dose:  1 Tab Take 1 Tab by mouth four (4) times daily as needed for Diarrhea. Max Daily Amount: 4 Tabs. Quantity:  20 Tab Refills:  0  
     
   
   
   
  
 glucose blood VI test strips strip Commonly known as:  ASCENSIA AUTODISC VI, ONE TOUCH ULTRA TEST VI Your next dose is: Today, Tomorrow Other:  ____________ Use up to four times daily Quantity:  100 Strip Refills:  5  
     
   
   
   
  
 glyBURIDE 1.25 mg tablet Commonly known as:  Dotty Cork Your next dose is: Today, Tomorrow Other:  ____________ Take 1/2 pill if you have an evening snack Quantity:  30 Tab Refills:  3  
     
   
   
   
  
 labetalol 100 mg tablet Commonly known as:  Chyrel Goins Your next dose is: Today, Tomorrow Other:  ____________ Dose:  100 mg Take 100 mg by mouth two (2) times a day. Refills:  0 Lancets Misc Your next dose is: Today, Tomorrow Other:  ____________ Use up to three times per days;  
 Quantity:  1 Each Refills:  11  
     
   
   
   
  
 metFORMIN 1,000 mg tablet Commonly known as:  GLUCOPHAGE Your next dose is: Today, Tomorrow Other:  ____________ Dose:  500 mg Take 0.5 Tabs by mouth two (2) times a day. Quantity:  60 Tab Refills:  5 PEN NEEDLE 31 gauge x 5/16\" Ndle Generic drug:  Insulin Needles (Disposable) Your next dose is: Today, Tomorrow Other:  ____________ Refills:  0  
     
   
   
   
  
 prenatal vitamin 27 mg iron- 800 mcg Tab tablet Your next dose is: Today, Tomorrow Other:  ____________  
   
   
 daily. Refills:  0 SITagliptin 100 mg tablet Commonly known as:  Arthea Legato Your next dose is: Today, Tomorrow Other:  ____________ Dose:  100 mg Take 1 Tab by mouth daily. Quantity:  30 Tab Refills:  5  
     
   
   
   
  
 * Notice: This list has 2 medication(s) that are the same as other medications prescribed for you. Read the directions carefully, and ask your doctor or other care provider to review them with you.

## 2017-02-19 ENCOUNTER — HOSPITAL ENCOUNTER (EMERGENCY)
Age: 32
Discharge: HOME | End: 2017-02-20
Attending: SPECIALIST | Admitting: OBSTETRICS & GYNECOLOGY
Payer: MEDICAID

## 2017-02-19 PROCEDURE — 87086 URINE CULTURE/COLONY COUNT: CPT | Performed by: OBSTETRICS & GYNECOLOGY

## 2017-02-19 PROCEDURE — 81001 URINALYSIS AUTO W/SCOPE: CPT | Performed by: OBSTETRICS & GYNECOLOGY

## 2017-02-19 NOTE — IP AVS SNAPSHOT
[address] [facility] 
[phone] Patient: Arden Roldan MRN: [id number] :1985 You are allergic to the following Allergen Reactions Mirapex (Pramipexole) Drowsiness Morphine Rash Tramadol Other (comments)  
 headache Recent Documentation Height Weight BMI OB Status Smoking Status 1.651 m 117 kg 42.93 kg/m2 Pregnant Current Every Day Smoker Unresulted Labs Order Current Status CULTURE, URINE In process Emergency Contacts Name Discharge Info Relation Home Work Mobile Scooter Warner DISCHARGE CAREGIVER [3] Boyfriend [17] [phone] About your hospitalization You were admitted on:  N/A You last received care in the:  [facility] 3 LD TRIAGE You were discharged on:  2017 Unit phone number:  [phone] Why you were hospitalized Your primary diagnosis was:  Not on File Providers Seen During Your Hospitalizations Provider Role Specialty Primary office phone Nba Rojo MD Attending Provider Obstetrics & Gynecology [phone] Your Primary Care Physician (PCP) Primary Care Physician Office Phone Office Fax [facility] [phone] Follow-up Information Follow up With Details Comments Contact Info Cecil Jang MD   [address] [place] [address] Primary Care [address] [place] [address] 
[phone] Your Appointments 2017  9:30 AM EST Follow Up with Meghan Dawn MD  
[facility] Diabetes and Endocrinology [address]) [address] [address] [phone] Current Discharge Medication List  
  
ASK your doctor about these medications Dose & Instructions Dispensing Information Comments Morning Noon Evening Bedtime * albuterol 90 mcg/actuation inhaler Commonly known as:  PROAIR HFA Your next dose is: Today, Tomorrow Other:  _________ Dose:  2 Puff Take 2 Puffs by inhalation every four (4) hours as needed for Wheezing. Quantity:  1 Inhaler Refills:  5  
     
   
   
   
  
 * albuterol 2.5 mg /3 mL (0.083 %) nebulizer solution Commonly known as:  PROVENTIL VENTOLIN Your next dose is: Today, Tomorrow Other:  _________ Dose:  2.5 mg  
3 mL by Nebulization route every four (4) hours as needed for Wheezing. Quantity:  100 Each Refills:  5  
     
   
   
   
  
 aspirin 81 mg chewable tablet Your next dose is: Today, Tomorrow Other:  _________ Dose:  81 mg Take 1 Tab by mouth daily. Quantity:  30 Tab Refills:  5 Blood-Glucose Meter monitoring kit Your next dose is: Today, Tomorrow Other:  _________ Use up to three times per days Quantity:  1 Kit Refills:  0  
     
   
   
   
  
 diphenoxylate-atropine 2.5-0.025 mg per tablet Commonly known as:  LOMOTIL Your next dose is: Today, Tomorrow Other:  _________ Dose:  1 Tab Take 1 Tab by mouth four (4) times daily as needed for Diarrhea. Max Daily Amount: 4 Tabs. Quantity:  20 Tab Refills:  0  
     
   
   
   
  
 glucose blood VI test strips strip Commonly known as:  ASCENSIA AUTODISC VI, ONE TOUCH ULTRA TEST VI Your next dose is: Today, Tomorrow Other:  _________ Use up to four times daily Quantity:  100 Strip Refills:  5  
     
   
   
   
  
 glyBURIDE 1.25 mg tablet Commonly known as:  [place] Your next dose is: Today, Tomorrow Other:  _________ Take 1/2 pill if you have an evening snack Quantity:  30 Tab Refills:  3  
     
   
   
   
  
 labetalol 100 mg tablet Commonly known as:  Gino Ishan Your next dose is: Today, Tomorrow Other:  _________ Dose:  100 mg Take 100 mg by mouth two (2) times a day. Refills:  0 Lancets Misc Your next dose is: Today, Tomorrow Other:  _________ Use up to three times per days;  
 Quantity:  1 Each Refills:  11  
     
   
   
   
  
 metFORMIN 1,000 mg tablet Commonly known as:  GLUCOPHAGE Your next dose is: Today, Tomorrow Other:  _________ Dose:  500 mg Take 0.5 Tabs by mouth two (2) times a day. Quantity:  60 Tab Refills:  5 PEN NEEDLE 31 gauge x 5/16\" Ndle Generic drug:  Insulin Needles (Disposable) Your next dose is: Today, Tomorrow Other:  _________ Refills:  0  
     
   
   
   
  
 prenatal vitamin 27 mg iron- 800 mcg Tab tablet Your next dose is: Today, Tomorrow Other:  _________  
   
   
 daily. Refills:  0 SITagliptin 100 mg tablet Commonly known as:  Max Knights Your next dose is: Today, Tomorrow Other:  _________ Dose:  100 mg Take 1 Tab by mouth daily. Quantity:  30 Tab Refills:  5  
     
   
   
   
  
 * Notice: This list has 2 medication(s) that are the same as other medications prescribed for you. Read the directions carefully, and ask your doctor or other care provider to review them with you. Discharge Instructions [place] Contractions: Care Instructions Your Care Instructions Brooke Mcgarry contractions prepare your uterus for labor. Think of them as a \"warm-up\" exercise that your body does. You may begin to feel them between the 28th and 30th weeks of your pregnancy. But they start as early as the 20th week. Brooke Mcgarry contractions usually occur more often during the ninth month.  They may go away when you are active and return when you rest. These contractions are like mild contractions of true labor, but they occur less often. (You feel fewer than 8 in an hour.) They don't cause your cervix to open. It may be hard for you to tell the difference between [facility] contractions and true labor, especially in your first pregnancy. Follow-up care is a key part of your treatment and safety. Be sure to make and go to all appointments, and call your doctor if you are having problems. It's also a good idea to know your test results and keep a list of the medicines you take. How can you care for yourself at home? · Try a warm bath to help relieve muscle tension and reduce pain. · Change positions every 30 minutes. Take breaks if you must sit for a long time. Get up and walk around. · Drink plenty of water, enough so that your urine is light yellow or clear like water. · Taking short walks may help you feel better. Your doctor needs to check any contractions that are getting stronger or closer together. Where can you learn more? Go to http://sheree-frank.info/. Enter [phone] in the search box to learn more about \"Sergei Mcgarry Contractions: Care Instructions. \" Current as of: May 30, 2016 Content Version: 11.1 © 9377-7006 [employer]. Care instructions adapted under license by [employer] (which disclaims liability or warranty for this information). If you have questions about a medical condition or this instruction, always ask your healthcare professional. [place] [address] any warranty or liability for your use of this information. Discharge Orders None Introducing [facility]! Alda Rios introduces [employer] patient portal. Now you can access parts of your medical record, email your doctor's office, and request medication refills online. 1. In your internet browser, go to [email or web address] 2. Click on the First Time User? Click Here link in the Sign In box. You will see the New Member Sign Up page. 3. Enter your [facility] Access Code exactly as it appears below. You will not need to use this code after youve completed the sign-up process. If you do not sign up before the expiration date, you must request a new code. · [facility] Access Code: [email or web address] Expires: 5/15/2017  4:02 PM 
 
4. Enter the last four digits of your Social Security Number (xxxx) and Date of Birth (mm/dd/yyyy) as indicated and click Submit. You will be taken to the next sign-up page. 5. Create a [facility] ID. This will be your [facility] login ID and cannot be changed, so think of one that is secure and easy to remember. 6. Create a [facility] password. You can change your password at any time. 7. Enter your Password Reset Question and Answer. This can be used at a later time if you forget your password. 8. Enter your e-mail address. You will receive e-mail notification when new information is available in [address]. 9. Click Sign Up. You can now view and download portions of your medical record. 10. Click the Download Summary menu link to download a portable copy of your medical information. If you have questions, please visit the Frequently Asked Questions section of the [facility] website. Remember, [facility] is NOT to be used for urgent needs. For medical emergencies, dial 911. Now available from your iPhone and Android! General Information Please provide this summary of care documentation to your next provider. Patient Signature:  ____________________________________________________________ Date:  ____________________________________________________________  
  
Donn Dao Provider Signature:  ____________________________________________________________ Date:  ____________________________________________________________

## 2017-02-19 NOTE — IP AVS SNAPSHOT
Current Discharge Medication List  
  
ASK your doctor about these medications Dose & Instructions Dispensing Information Comments Morning Noon Evening Bedtime * albuterol 90 mcg/actuation inhaler Commonly known as:  PROAIR HFA Your next dose is: Today, Tomorrow Other:  ____________ Dose:  2 Puff Take 2 Puffs by inhalation every four (4) hours as needed for Wheezing. Quantity:  1 Inhaler Refills:  5  
     
   
   
   
  
 * albuterol 2.5 mg /3 mL (0.083 %) nebulizer solution Commonly known as:  PROVENTIL VENTOLIN Your next dose is: Today, Tomorrow Other:  ____________ Dose:  2.5 mg  
3 mL by Nebulization route every four (4) hours as needed for Wheezing. Quantity:  100 Each Refills:  5  
     
   
   
   
  
 aspirin 81 mg chewable tablet Your next dose is: Today, Tomorrow Other:  ____________ Dose:  81 mg Take 1 Tab by mouth daily. Quantity:  30 Tab Refills:  5 Blood-Glucose Meter monitoring kit Your next dose is: Today, Tomorrow Other:  ____________ Use up to three times per days Quantity:  1 Kit Refills:  0  
     
   
   
   
  
 diphenoxylate-atropine 2.5-0.025 mg per tablet Commonly known as:  LOMOTIL Your next dose is: Today, Tomorrow Other:  ____________ Dose:  1 Tab Take 1 Tab by mouth four (4) times daily as needed for Diarrhea. Max Daily Amount: 4 Tabs. Quantity:  20 Tab Refills:  0  
     
   
   
   
  
 glucose blood VI test strips strip Commonly known as:  ASCENSIA AUTODISC VI, ONE TOUCH ULTRA TEST VI Your next dose is: Today, Tomorrow Other:  ____________ Use up to four times daily Quantity:  100 Strip Refills:  5  
     
   
   
   
  
 glyBURIDE 1.25 mg tablet Commonly known as:  Iram Belcher Your next dose is: Today, Tomorrow Other:  ____________ Take 1/2 pill if you have an evening snack Quantity:  30 Tab Refills:  3  
     
   
   
   
  
 labetalol 100 mg tablet Commonly known as:  Jm Shanae Your next dose is: Today, Tomorrow Other:  ____________ Dose:  100 mg Take 100 mg by mouth two (2) times a day. Refills:  0 Lancets Misc Your next dose is: Today, Tomorrow Other:  ____________ Use up to three times per days;  
 Quantity:  1 Each Refills:  11  
     
   
   
   
  
 metFORMIN 1,000 mg tablet Commonly known as:  GLUCOPHAGE Your next dose is: Today, Tomorrow Other:  ____________ Dose:  500 mg Take 0.5 Tabs by mouth two (2) times a day. Quantity:  60 Tab Refills:  5 PEN NEEDLE 31 gauge x 5/16\" Ndle Generic drug:  Insulin Needles (Disposable) Your next dose is: Today, Tomorrow Other:  ____________ Refills:  0  
     
   
   
   
  
 prenatal vitamin 27 mg iron- 800 mcg Tab tablet Your next dose is: Today, Tomorrow Other:  ____________  
   
   
 daily. Refills:  0 SITagliptin 100 mg tablet Commonly known as:  Vinh Romero Your next dose is: Today, Tomorrow Other:  ____________ Dose:  100 mg Take 1 Tab by mouth daily. Quantity:  30 Tab Refills:  5  
     
   
   
   
  
 * Notice: This list has 2 medication(s) that are the same as other medications prescribed for you. Read the directions carefully, and ask your doctor or other care provider to review them with you.

## 2017-02-20 VITALS
TEMPERATURE: 98.2 F | DIASTOLIC BLOOD PRESSURE: 64 MMHG | SYSTOLIC BLOOD PRESSURE: 114 MMHG | RESPIRATION RATE: 18 BRPM | BODY MASS INDEX: 42.99 KG/M2 | WEIGHT: 258 LBS | HEART RATE: 84 BPM | HEIGHT: 65 IN | OXYGEN SATURATION: 99 %

## 2017-02-20 LAB
APPEARANCE UR: ABNORMAL
BACTERIA URNS QL MICRO: ABNORMAL /HPF
BILIRUB UR QL CFM: NEGATIVE
CAOX CRY URNS QL MICRO: ABNORMAL
COLOR UR: ABNORMAL
EPITH CASTS URNS QL MICRO: ABNORMAL /LPF
GLUCOSE UR STRIP.AUTO-MCNC: NEGATIVE MG/DL
HGB UR QL STRIP: NEGATIVE
HYALINE CASTS URNS QL MICRO: ABNORMAL /LPF (ref 0–5)
KETONES UR QL STRIP.AUTO: ABNORMAL MG/DL
LEUKOCYTE ESTERASE UR QL STRIP.AUTO: NEGATIVE
NITRITE UR QL STRIP.AUTO: NEGATIVE
PH UR STRIP: 6.5 [PH] (ref 5–8)
PROT UR STRIP-MCNC: NEGATIVE MG/DL
RBC #/AREA URNS HPF: ABNORMAL /HPF (ref 0–5)
SP GR UR REFRACTOMETRY: 1.02 (ref 1–1.03)
UA: UC IF INDICATED,UAUC: ABNORMAL
UROBILINOGEN UR QL STRIP.AUTO: 2 EU/DL (ref 0.2–1)
WBC URNS QL MICRO: ABNORMAL /HPF (ref 0–4)

## 2017-02-20 PROCEDURE — 99282 EMERGENCY DEPT VISIT SF MDM: CPT

## 2017-02-20 PROCEDURE — 74011250637 HC RX REV CODE- 250/637: Performed by: OBSTETRICS & GYNECOLOGY

## 2017-02-20 PROCEDURE — 59025 FETAL NON-STRESS TEST: CPT

## 2017-02-20 RX ORDER — ACETAMINOPHEN 325 MG/1
TABLET ORAL
Status: DISCONTINUED
Start: 2017-02-20 | End: 2017-02-20 | Stop reason: HOSPADM

## 2017-02-20 RX ORDER — ACETAMINOPHEN 325 MG/1
650 TABLET ORAL ONCE
Status: COMPLETED | OUTPATIENT
Start: 2017-02-20 | End: 2017-02-20

## 2017-02-20 RX ADMIN — ACETAMINOPHEN 650 MG: 325 TABLET, FILM COATED ORAL at 01:36

## 2017-02-20 NOTE — PROGRESS NOTES
Admitted to labor and delivery triage with c/o Swelling everywhere since 0900,  Hard to breath when laying down flat, Frontal H/A since 2000, Left lower and upper abd. Pain that comes and goes, Decreased Fetal movement and nausea since 0930 and not voiding a lot. Intro. Done. poc explained. [place] to rm. To br to void and change clothes.  here in rm.    2312. To bed. efm explained and applied. Assess. Done. poc explained. Consents explained and signed. Verbalized understanding of instr. 2355. Urine sent to lab    0120. Dr. Lulu Rodriguez at bedside. View strip. poc explained. Assess. Done. View ua result. 2481. Discharge instr. Given. Verbalized understanding. L/o    7395. Tylenol given. . oob to change clothes. 0229. Home undelivered with instr. Accompanied by FOB in stable cond. States she is ok to go home and will follow up with her physician.

## 2017-02-20 NOTE — DISCHARGE INSTRUCTIONS
Eileen Cera Contractions: Care Instructions  Your Care Instructions  Sergei Mcgarry contractions prepare your uterus for labor. Think of them as a \"warm-up\" exercise that your body does. You may begin to feel them between the 28th and 30th weeks of your pregnancy. But they start as early as the 20th week. Nunica Mcgarry contractions usually occur more often during the ninth month. They may go away when you are active and return when you rest. These contractions are like mild contractions of true labor, but they occur less often. (You feel fewer than 8 in an hour.) They don't cause your cervix to open. It may be hard for you to tell the difference between Eileen Cera contractions and true labor, especially in your first pregnancy. Follow-up care is a key part of your treatment and safety. Be sure to make and go to all appointments, and call your doctor if you are having problems. It's also a good idea to know your test results and keep a list of the medicines you take. How can you care for yourself at home? · Try a warm bath to help relieve muscle tension and reduce pain. · Change positions every 30 minutes. Take breaks if you must sit for a long time. Get up and walk around. · Drink plenty of water, enough so that your urine is light yellow or clear like water. · Taking short walks may help you feel better. Your doctor needs to check any contractions that are getting stronger or closer together. Where can you learn more? Go to http://sheree-frank.info/. Enter [phone] in the search box to learn more about \"Nunica Mcgarry Contractions: Care Instructions. \"  Current as of: May 30, 2016  Content Version: 11.1  © 1442-2938 anywayanyday. Care instructions adapted under license by [employer] (which disclaims liability or warranty for this information).  If you have questions about a medical condition or this instruction, always ask your healthcare professional. [employer] disclaims any warranty or liability for your use of this information.

## 2017-02-20 NOTE — H&P
Labor and Delivery Admission Note  2/20/2017    33 y.o. G1 at 35w1d here reporting several concerns. She reports a headache this evening since 8 pm which is frontal and has not limited activity; she has not yet taken any medication for it. She does not have visual changes. She also reports intermittent sharp pain in the left side of her abdomen (upper and lower): she states this mainly occurs if she lies on or stretches her left side and is alleviated if she lies down on her right side or shifts position. She was concerned about fetal movement earlier in the evening but now reports feeling active movement since she has been in triage. She also reports swelling that has been present for weeks for her report, unchanged in amount. She denies vaginal bleeding, contractions, or gushes of fluid. Her pregnancy is complicated by preexisting diabetes and chronic hypertension managed with labetalol. She reports that her blood sugar this evening was 136. She reports that she has been undergoing weekly ultrasound with MFM with reassuring findings. Past Medical History   Diagnosis Date    Asthma     Diabetes - on metformin 500 mg BID, Januvia 100 mg every day, glyburide 1.25 mg QHS     Hypertension - on labetalol 100 mg BID      Past Surgical History   Procedure Laterality Date    Hx tympanostomy       OB/GYN: [facility]    Meds:     Current Outpatient Prescriptions on File Prior to Encounter   Medication Sig Dispense Refill    aspirin 81 mg chewable tablet Take 1 Tab by mouth daily. 30 Tab 5    metFORMIN (GLUCOPHAGE) 1,000 mg tablet Take 0.5 Tabs by mouth two (2) times a day. 60 Tab 5    SITagliptin (JANUVIA) 100 mg tablet Take 1 Tab by mouth daily. 30 Tab 5    glyBURIDE (DIABETA) 1.25 mg tablet Take 1/2 pill if you have an evening snack 30 Tab 3    labetalol (NORMODYNE) 100 mg tablet Take 100 mg by mouth two (2) times a day.       PRENATAL VIT#96/FERROUS FUM/FA (PRENATAL VITAMIN) 27 mg iron- 800 mcg tab tablet daily.  Blood-Glucose Meter monitoring kit Use up to three times per days 1 Kit 0    albuterol (PROVENTIL VENTOLIN) 2.5 mg /3 mL (0.083 %) nebulizer solution 3 mL by Nebulization route every four (4) hours as needed for Wheezing. 100 Each 5    diphenoxylate-atropine (LOMOTIL) 2.5-0.025 mg per tablet Take 1 Tab by mouth four (4) times daily as needed for Diarrhea. Max Daily Amount: 4 Tabs. 20 Tab 0    Lancets misc Use up to three times per days; 1 Each 11    glucose blood VI test strips (ASCENSIA AUTODISC VI, ONE TOUCH ULTRA TEST VI) strip Use up to four times daily 100 Strip 5    albuterol (PROAIR HFA) 90 mcg/actuation inhaler Take 2 Puffs by inhalation every four (4) hours as needed for Wheezing. 1 Inhaler 5    PEN NEEDLE 31 X 5/16 \" ndle   0         Allergies: Allergies   Allergen Reactions    Mirapex [Pramipexole] Drowsiness    Morphine Rash    Tramadol Other (comments)     headache     Pertinent ROS: No fever, chills, vomiting. Occasional diarrhea for several weeks but had a normal bowel movement yesterday per her report. Reports normal po intake/appetite. No CP/SOB/LP.     Family History   Problem Relation Age of Onset    Diabetes Father     Heart Disease Father      CAD    Hypertension Father     Diabetes Mother     Hypertension Mother     Liver Disease Mother     Deep Vein Thrombosis Mother     Pulmonary Embolism Mother     Hypertension Sister     Deep Vein Thrombosis Sister     Pulmonary Embolism Sister     Diabetes Maternal Uncle     Cancer Maternal Grandmother      Lung Cancer       Social History    Marital status: SINGLE     Spouse name: N/A    Number of children: 0    Years of education: N/A     Occupational History    disabled      Social History Main Topics    Smoking status: Current Every Day Smoker     Packs/day: 0.10     Types: Cigarettes     Start date: 3/23/2004     Last attempt to quit: 1/1/2017    Smokeless tobacco: Never Used    Alcohol use No    Drug use: No    Sexual activity: Yes     Partners: Male     Social History Narrative    Lives with fiance        OBJECTIVE:    Temp (24hrs), Av.2 °F (36.8 °C), Min:98.2 °F (36.8 °C), Max:98.2 °F (36.8 °C)    Visit Vitals    /64    Pulse 84    Temp 98.2 °F (36.8 °C)    Resp 18    Ht 5' 5\" (1.651 m)    Wt 117 kg (258 lb)    LMP 05/10/2016    SpO2 99%    BMI 42.93 kg/m2       FHR baseline 145, moderate variability, + accels, no decels over approximately 2 hours of monitoring  [place] no contractions seen    Exam:  General: alert, no distress  HEENT:  normal   Abdomen:  Soft, non-distended. Non-tender to palpation. Fundus non-tender. No peritoneal signs. Cervix:  Closed, thick  Fluid:  None seen  Extremities: trace lower extremity edema, non-tender calves    Impression:  G1 at 35w1d with issues as follows:    1) Mild headache - Tylenol 650 mg po given. Oral hydration advised. Patient has history of chronic hypertension on labetalol and is normotensive this evening with no proteinuria identified on UA. Swelling is stable per her report. Superimposed preeclampsia is not suspected at this time. 2)  Left abdominal pain - intermittent and positional nature of her pain suggests muscular etiology. Fundus/abdomen non-tender to palpation; no current symptoms of infection or  labor. Discussed avoidance of abdominal muscle strain and precautions for severe pain, contractions, or pain accompanied by bleeding. 3) Fetal movement - currently reporting normal movement; NST reactive. Reviewed fetal movement precautions. Following discussion of these issues, patient reports that she is comfortable with discharge home. She has an appointment with [facility] tomorrow 17 per her report. Encouraged to also schedule appt with Dr. Joe Salinas to coordinate her delivery planning. She expressed good understanding of all counseling and recommendations.     Pedro Stevenson MD

## 2017-02-21 LAB
BACTERIA SPEC CULT: NORMAL
CC UR VC: NORMAL
SERVICE CMNT-IMP: NORMAL

## 2017-02-22 LAB — GRBS, EXTERNAL: POSITIVE

## 2017-03-02 ENCOUNTER — TELEPHONE (OUTPATIENT)
Dept: ENDOCRINOLOGY | Age: 32
End: 2017-03-02

## 2017-03-02 NOTE — TELEPHONE ENCOUNTER
Patient stated that she is unable to come tomorrow for her appointment due to having car trouble. She stated that she would like for you to give her a call because she would like to see you before her baby is due but stated that she does not know if she can come in because she does not have a ride. She can be reached at [phone]. Thank you.        Sean Mackey

## 2017-03-03 NOTE — TELEPHONE ENCOUNTER
Multiple attempts to contact Ms Jessie Abdullahi were made, but there was no answer and \"mailbox is full\". At our last visit she was taking Metformin 500mg with breakfast and dinner and Januvia 100mg daily. 1) Is she still taking this regimen? 2) How have her blood sugars been running over the past couple of weeks? 3) Is she still on steroids? 4) Is she still scheduled for  on 3/14/17?     Thanks,    Rose Plascencia

## 2017-03-06 NOTE — TELEPHONE ENCOUNTER
Addendum: 3/6/2017, 2:11 PM  Spoke with Alexandre Jones by phone,    Spoke with patient. She states that she is still taking Metformin and Januvia, as directed. Her blood sugars are between 's. She is not on steroids and she is scheduled to be induced on 3/13/17. Advised to continue same regimen, as long as BSs run under 100 fasting. Schedule appointment for 6 weeks after the baby is born. Patient states that she understands these instructions. She will call back for an appointment.     Dk Castillo LPN

## 2017-03-08 RX ORDER — GLYBURIDE 1.25 MG/1
TABLET ORAL
Qty: 30 TAB | Refills: 3 | Status: SHIPPED | OUTPATIENT
Start: 2017-03-08 | End: 2017-07-27 | Stop reason: SDUPTHER

## 2017-03-12 ENCOUNTER — HOSPITAL ENCOUNTER (INPATIENT)
Age: 32
LOS: 4 days | Discharge: HOME | DRG: 540 | End: 2017-03-16
Attending: SPECIALIST | Admitting: OBSTETRICS & GYNECOLOGY
Payer: MEDICAID

## 2017-03-12 PROBLEM — Z34.90 PREGNANCY: Status: ACTIVE | Noted: 2017-03-12

## 2017-03-12 LAB
ALBUMIN SERPL BCP-MCNC: 2.5 G/DL (ref 3.5–5)
ALBUMIN/GLOB SERPL: 0.8 {RATIO} (ref 1.1–2.2)
ALP SERPL-CCNC: 132 U/L (ref 45–117)
ALT SERPL-CCNC: 58 U/L (ref 12–78)
ANION GAP BLD CALC-SCNC: 12 MMOL/L (ref 5–15)
AST SERPL W P-5'-P-CCNC: 26 U/L (ref 15–37)
BASOPHILS # BLD AUTO: 0 K/UL (ref 0–0.1)
BASOPHILS # BLD: 0 % (ref 0–1)
BILIRUB SERPL-MCNC: 0.3 MG/DL (ref 0.2–1)
BUN SERPL-MCNC: 8 MG/DL (ref 6–20)
BUN/CREAT SERPL: 14 (ref 12–20)
CALCIUM SERPL-MCNC: 9 MG/DL (ref 8.5–10.1)
CHLORIDE SERPL-SCNC: 106 MMOL/L (ref 97–108)
CO2 SERPL-SCNC: 22 MMOL/L (ref 21–32)
CREAT SERPL-MCNC: 0.57 MG/DL (ref 0.55–1.02)
DIFFERENTIAL METHOD BLD: ABNORMAL
EOSINOPHIL # BLD: 0.1 K/UL (ref 0–0.4)
EOSINOPHIL NFR BLD: 1 % (ref 0–7)
ERYTHROCYTE [DISTWIDTH] IN BLOOD BY AUTOMATED COUNT: 15.8 % (ref 11.5–14.5)
GLOBULIN SER CALC-MCNC: 3.2 G/DL (ref 2–4)
GLUCOSE SERPL-MCNC: 91 MG/DL (ref 65–100)
HCT VFR BLD AUTO: 32.7 % (ref 35–47)
HGB BLD-MCNC: 9.9 G/DL (ref 11.5–16)
LDH SERPL L TO P-CCNC: 159 U/L (ref 81–246)
LYMPHOCYTES # BLD AUTO: 26 % (ref 12–49)
LYMPHOCYTES # BLD: 2.8 K/UL (ref 0.8–3.5)
MCH RBC QN AUTO: 22 PG (ref 26–34)
MCHC RBC AUTO-ENTMCNC: 30.3 G/DL (ref 30–36.5)
MCV RBC AUTO: 72.5 FL (ref 80–99)
MONOCYTES # BLD: 0.6 K/UL (ref 0–1)
MONOCYTES NFR BLD AUTO: 6 % (ref 5–13)
NEUTS SEG # BLD: 7.2 K/UL (ref 1.8–8)
NEUTS SEG NFR BLD AUTO: 67 % (ref 32–75)
PLATELET # BLD AUTO: 302 K/UL (ref 150–400)
POTASSIUM SERPL-SCNC: 4.1 MMOL/L (ref 3.5–5.1)
PROT SERPL-MCNC: 5.7 G/DL (ref 6.4–8.2)
RBC # BLD AUTO: 4.51 M/UL (ref 3.8–5.2)
RBC MORPH BLD: ABNORMAL
SODIUM SERPL-SCNC: 140 MMOL/L (ref 136–145)
URATE SERPL-MCNC: 4.3 MG/DL (ref 2.6–6)
WBC # BLD AUTO: 10.7 K/UL (ref 3.6–11)

## 2017-03-12 PROCEDURE — 36415 COLL VENOUS BLD VENIPUNCTURE: CPT | Performed by: OBSTETRICS & GYNECOLOGY

## 2017-03-12 PROCEDURE — 75410000002 HC LABOR FEE PER 1 HR

## 2017-03-12 PROCEDURE — 83615 LACTATE (LD) (LDH) ENZYME: CPT | Performed by: OBSTETRICS & GYNECOLOGY

## 2017-03-12 PROCEDURE — 4A0HXCZ MEASUREMENT OF PRODUCTS OF CONCEPTION, CARDIAC RATE, EXTERNAL APPROACH: ICD-10-PCS | Performed by: SPECIALIST

## 2017-03-12 PROCEDURE — 77030003666 HC NDL SPINAL BD -A

## 2017-03-12 PROCEDURE — 74011250636 HC RX REV CODE- 250/636: Performed by: OBSTETRICS & GYNECOLOGY

## 2017-03-12 PROCEDURE — 74011000258 HC RX REV CODE- 258: Performed by: OBSTETRICS & GYNECOLOGY

## 2017-03-12 PROCEDURE — 77030007880 HC KT SPN EPDRL BBMI -B

## 2017-03-12 PROCEDURE — 80053 COMPREHEN METABOLIC PANEL: CPT | Performed by: OBSTETRICS & GYNECOLOGY

## 2017-03-12 PROCEDURE — 74011250637 HC RX REV CODE- 250/637: Performed by: OBSTETRICS & GYNECOLOGY

## 2017-03-12 PROCEDURE — 84550 ASSAY OF BLOOD/URIC ACID: CPT | Performed by: OBSTETRICS & GYNECOLOGY

## 2017-03-12 PROCEDURE — 65410000002 HC RM PRIVATE OB

## 2017-03-12 PROCEDURE — 77030034849

## 2017-03-12 PROCEDURE — 59200 INSERT CERVICAL DILATOR: CPT

## 2017-03-12 PROCEDURE — 85025 COMPLETE CBC W/AUTO DIFF WBC: CPT | Performed by: OBSTETRICS & GYNECOLOGY

## 2017-03-12 PROCEDURE — 77030018749 HC HK AMNIO DISP DERY -A

## 2017-03-12 PROCEDURE — 3E0P7GC INTRODUCTION OF OTHER THERAPEUTIC SUBSTANCE INTO FEMALE REPRODUCTIVE, VIA NATURAL OR ARTIFICIAL OPENING: ICD-10-PCS | Performed by: SPECIALIST

## 2017-03-12 RX ORDER — SODIUM CHLORIDE 0.9 % (FLUSH) 0.9 %
5-10 SYRINGE (ML) INJECTION AS NEEDED
Status: DISCONTINUED | OUTPATIENT
Start: 2017-03-12 | End: 2017-03-16 | Stop reason: HOSPADM

## 2017-03-12 RX ORDER — SODIUM CHLORIDE, SODIUM LACTATE, POTASSIUM CHLORIDE, CALCIUM CHLORIDE 600; 310; 30; 20 MG/100ML; MG/100ML; MG/100ML; MG/100ML
75 INJECTION, SOLUTION INTRAVENOUS CONTINUOUS
Status: DISCONTINUED | OUTPATIENT
Start: 2017-03-12 | End: 2017-03-16 | Stop reason: HOSPADM

## 2017-03-12 RX ORDER — NALOXONE HYDROCHLORIDE 0.4 MG/ML
0.4 INJECTION, SOLUTION INTRAMUSCULAR; INTRAVENOUS; SUBCUTANEOUS AS NEEDED
Status: DISCONTINUED | OUTPATIENT
Start: 2017-03-12 | End: 2017-03-13 | Stop reason: HOSPADM

## 2017-03-12 RX ORDER — ONDANSETRON 2 MG/ML
4 INJECTION INTRAMUSCULAR; INTRAVENOUS
Status: DISCONTINUED | OUTPATIENT
Start: 2017-03-12 | End: 2017-03-13 | Stop reason: HOSPADM

## 2017-03-12 RX ORDER — LABETALOL 100 MG/1
100 TABLET, FILM COATED ORAL EVERY 12 HOURS
Status: DISCONTINUED | OUTPATIENT
Start: 2017-03-12 | End: 2017-03-16 | Stop reason: HOSPADM

## 2017-03-12 RX ORDER — ZOLPIDEM TARTRATE 5 MG/1
5 TABLET ORAL
Status: DISCONTINUED | OUTPATIENT
Start: 2017-03-12 | End: 2017-03-16 | Stop reason: HOSPADM

## 2017-03-12 RX ORDER — METFORMIN HYDROCHLORIDE 500 MG/1
500 TABLET ORAL 2 TIMES DAILY WITH MEALS
Status: DISCONTINUED | OUTPATIENT
Start: 2017-03-12 | End: 2017-03-16 | Stop reason: HOSPADM

## 2017-03-12 RX ORDER — OXYTOCIN IN 5 % DEXTROSE 30/500 ML
1-25 PLASTIC BAG, INJECTION (ML) INTRAVENOUS
Status: DISCONTINUED | OUTPATIENT
Start: 2017-03-12 | End: 2017-03-16 | Stop reason: HOSPADM

## 2017-03-12 RX ORDER — SODIUM CHLORIDE 0.9 % (FLUSH) 0.9 %
5-10 SYRINGE (ML) INJECTION AS NEEDED
Status: DISCONTINUED | OUTPATIENT
Start: 2017-03-12 | End: 2017-03-12

## 2017-03-12 RX ORDER — SODIUM CHLORIDE 0.9 % (FLUSH) 0.9 %
5-10 SYRINGE (ML) INJECTION EVERY 8 HOURS
Status: DISCONTINUED | OUTPATIENT
Start: 2017-03-12 | End: 2017-03-16 | Stop reason: HOSPADM

## 2017-03-12 RX ORDER — SODIUM CHLORIDE 0.9 % (FLUSH) 0.9 %
5-10 SYRINGE (ML) INJECTION EVERY 8 HOURS
Status: DISCONTINUED | OUTPATIENT
Start: 2017-03-12 | End: 2017-03-12

## 2017-03-12 RX ADMIN — Medication 10 ML: at 22:08

## 2017-03-12 RX ADMIN — DINOPROSTONE 10 MG: 10 INSERT VAGINAL at 18:15

## 2017-03-12 RX ADMIN — METFORMIN HYDROCHLORIDE 500 MG: 500 TABLET, FILM COATED ORAL at 19:44

## 2017-03-12 RX ADMIN — LABETALOL HYDROCHLORIDE 100 MG: 200 TABLET, FILM COATED ORAL at 21:13

## 2017-03-12 RX ADMIN — ZOLPIDEM TARTRATE 5 MG: 5 TABLET ORAL at 22:01

## 2017-03-12 RX ADMIN — SODIUM CHLORIDE 5 MILLION UNITS: 900 INJECTION, SOLUTION INTRAVENOUS at 23:55

## 2017-03-12 NOTE — IP AVS SNAPSHOT
[facility] 
[phone] Patient: Edu Santana MRN: [id number] :1985 You are allergic to the following Allergen Reactions Mirapex (Pramipexole) Drowsiness Morphine Rash Tramadol Other (comments)  
 headache Immunizations Administered for This Admission Name Date MMR 3/16/2017 Recent Documentation Height Weight Breastfeeding? 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 [3] Boyfriend [17] [phone] About your hospitalization You were admitted on:  2017 You last received care in the:  [facility] You were discharged on:  2017 Unit phone number:  [phone] Why you were hospitalized Your primary diagnosis was:  Not on File Your diagnoses also included:  Pregnancy Providers Seen During Your Hospitalizations Provider Role Specialty Primary office phone Dulce Orantes MD Attending Provider Obstetrics & Gynecology [phone] Your Primary Care Physician (PCP) Primary Care Physician Office Phone Office Fax Tanesha Hightower [phone] Follow-up Information Follow up With Details Comments Contact Info Valarie Azar MD   [address] [place] [address] Primary Care [address] [place] [address] 
[phone] Current Discharge Medication List  
  
START taking these medications Dose & Instructions Dispensing Information Comments Morning Noon Evening Bedtime  
 ibuprofen 200 mg tablet Commonly known as:  MOTRIN IB Your last dose was: Your next dose is: Dose:  800 mg Take 4 Tabs by mouth every eight (8) hours as needed for Pain. Quantity:  20 Tab Refills:  0  
     
   
   
   
  
 meperidine 50 mg tablet Commonly known as:  DEMEROL Your last dose was: Your next dose is:    
   
   
 Dose:  50 mg Take 1 Tab by mouth every four (4) hours as needed for Pain. Max Daily Amount: 300 mg. Quantity:  20 Tab Refills:  0 ASK your doctor about these medications Dose & Instructions Dispensing Information Comments Morning Noon Evening Bedtime * albuterol 90 mcg/actuation inhaler Commonly known as:  PROAIR HFA Your last dose was: Your next dose is:    
   
   
 Dose:  2 Puff Take 2 Puffs by inhalation every four (4) hours as needed for Wheezing. Quantity:  1 Inhaler Refills:  5  
     
   
   
   
  
 * albuterol 2.5 mg /3 mL (0.083 %) nebulizer solution Commonly known as:  PROVENTIL VENTOLIN Your last dose was: Your next dose is:    
   
   
 Dose:  2.5 mg  
3 mL by Nebulization route every four (4) hours as needed for Wheezing. Quantity:  100 Each Refills:  5  
     
   
   
   
  
 aspirin 81 mg chewable tablet Your last dose was: Your next dose is:    
   
   
 Dose:  81 mg Take 1 Tab by mouth daily. Quantity:  30 Tab Refills:  5 Blood-Glucose Meter monitoring kit Your last dose was: Your next dose is:    
   
   
 Use up to three times per days Quantity:  1 Kit Refills:  0  
     
   
   
   
  
 diphenoxylate-atropine 2.5-0.025 mg per tablet Commonly known as:  LOMOTIL Your last dose was: Your next dose is:    
   
   
 Dose:  1 Tab Take 1 Tab by mouth four (4) times daily as needed for Diarrhea. Max Daily Amount: 4 Tabs. Quantity:  20 Tab Refills:  0  
     
   
   
   
  
 glucose blood VI test strips strip Commonly known as:  ASCENSIA AUTODISC VI, ONE TOUCH ULTRA TEST VI Your last dose was: Your next dose is:    
   
   
 Use up to four times daily Quantity:  100 Strip Refills:  5  
     
   
   
   
  
 glyBURIDE 1.25 mg tablet Commonly known as:  Anat Cazares Your last dose was: Your next dose is: Take 1 pill if you have an evening snack Quantity:  30 Tab Refills:  3  
     
   
   
   
  
 labetalol 100 mg tablet Commonly known as:  Charo Connors Your last dose was: Your next dose is:    
   
   
 Dose:  100 mg Take 100 mg by mouth two (2) times a day. Refills:  0 Lancets Misc Your last dose was: Your next dose is:    
   
   
 Use up to three times per days;  
 Quantity:  1 Each Refills:  11  
     
   
   
   
  
 metFORMIN 1,000 mg tablet Commonly known as:  GLUCOPHAGE Your last dose was: Your next dose is:    
   
   
 Dose:  500 mg Take 0.5 Tabs by mouth two (2) times a day. Quantity:  60 Tab Refills:  5 PEN NEEDLE 31 gauge x 5/16\" Ndle Generic drug:  Insulin Needles (Disposable) Your last dose was: Your next dose is:    
   
   
  Refills:  0  
     
   
   
   
  
 prenatal vitamin 27 mg iron- 800 mcg Tab tablet Your last dose was: Your next dose is:    
   
   
 daily. Refills:  0 SITagliptin 100 mg tablet Commonly known as:  Argenis Bump Your last dose was: Your next dose is:    
   
   
 Dose:  100 mg Take 1 Tab by mouth daily. Quantity:  30 Tab Refills:  5  
     
   
   
   
  
 * Notice: This list has 2 medication(s) that are the same as other medications prescribed for you. Read the directions carefully, and ask your doctor or other care provider to review them with you. Where to Get Your Medications Information on where to get these meds will be given to you by the nurse or doctor. ! Ask your nurse or doctor about these medications  
  ibuprofen 200 mg tablet  
 meperidine 50 mg tablet Discharge Instructions  Section: What to Expect at [facility] Your Recovery A  section, or , is surgery to deliver your baby through a cut, called an incision, that the doctor makes in your lower belly and uterus. You may have some pain in your lower belly and need pain medicine for 1 to 2 weeks. You can expect some vaginal bleeding for several weeks. You will probably need about 6 weeks to fully recover. It is important to take it easy while the incision is healing. Avoid heavy lifting, strenuous activities, or exercises that strain the belly muscles while you are recovering. Ask a family member or friend for help with housework, cooking, and shopping. This care sheet gives you a general idea about how long it will take for you to recover. But each person recovers at a different pace. Follow the steps below to get better as quickly as possible. How can you care for yourself at home? Activity · Rest when you feel tired. Getting enough sleep will help you recover. · Try to walk each day. Start by walking a little more than you did the day before. Bit by bit, increase the amount you walk. Walking boosts blood flow and helps prevent pneumonia, constipation, and blood clots. · Avoid strenuous activities, such as bicycle riding, jogging, weightlifting, and aerobic exercise, for 6 weeks or until your doctor says it is okay. · Until your doctor says it is okay, do not lift anything heavier than your baby. · Do not do sit-ups or other exercises that strain the belly muscles for 6 weeks or until your doctor says it is okay. · Hold a pillow over your incision when you cough or take deep breaths. This will support your belly and decrease your pain. · You may shower as usual. Pat the incision dry when you are done. · You will have some vaginal bleeding. Wear sanitary pads. Do not douche or use tampons until your doctor says it is okay. · Ask your doctor when you can drive again. · You will probably need to take at least 6 weeks off work. It depends on the type of work you do and how you feel. · Ask your doctor when it is okay for you to have sex. Diet · You can eat your normal diet. If your stomach is upset, try bland, low-fat foods like plain rice, broiled chicken, toast, and yogurt. · Drink plenty of fluids (unless your doctor tells you not to). · You may notice that your bowel movements are not regular right after your surgery. This is common. Try to avoid constipation and straining with bowel movements. You may want to take a fiber supplement every day. If you have not had a bowel movement after a couple of days, ask your doctor about taking a mild laxative. · If you are breastfeeding, do not drink any alcohol. Medicines · Your doctor will tell you if and when you can restart your medicines. He or she will also give you instructions about taking any new medicines. · If you take blood thinners, such as warfarin (Coumadin), clopidogrel (Plavix), or aspirin, be sure to talk to your doctor. He or she will tell you if and when to start taking those medicines again. Make sure that you understand exactly what your doctor wants you to do. · Take pain medicines exactly as directed. ¨ If the doctor gave you a prescription medicine for pain, take it as prescribed. ¨ If you are not taking a prescription pain medicine, ask your doctor if you can take an over-the-counter medicine. · If you think your pain medicine is making you sick to your stomach: 
¨ Take your medicine after meals (unless your doctor has told you not to). ¨ Ask your doctor for a different pain medicine. · If your doctor prescribed antibiotics, take them as directed.  Do not stop taking them just because you feel better. You need to take the full course of antibiotics. Incision care · If you have strips of tape on the incision, leave the tape on for a week or until it falls off. · Wash the area daily with warm, soapy water, and pat it dry. Don't use hydrogen peroxide or alcohol, which can slow healing. You may cover the area with a gauze bandage if it weeps or rubs against clothing. Change the bandage every day. · Keep the area clean and dry. Other instructions · If you breastfeed your baby, you may be more comfortable while you are healing if you place the baby so that he or she is not resting on your belly. Try tucking your baby under your arm, with his or her body along the side you will be feeding on. Support your baby's upper body with your arm. With that hand you can control your baby's head to bring his or her mouth to your breast. This is sometimes called the Superplayer hold. Follow-up care is a key part of your treatment and safety. Be sure to make and go to all appointments, and call your doctor if you are having problems. It's also a good idea to know your test results and keep a list of the medicines you take. When should you call for help? Call 911 anytime you think you may need emergency care. For example, call if: 
· You passed out (lost consciousness). · You have symptoms of a blood clot in your lung (called a pulmonary embolism). These may include: 
¨ Sudden chest pain. ¨ Trouble breathing. ¨ Coughing up blood. · You have thoughts of harming yourself, your baby, or another person. Call your doctor now or seek immediate medical care if: 
· You have severe vaginal bleeding. This means that you are soaking through a pad every hour for 2 or more hours. · You are dizzy or lightheaded, or you feel like you may faint. · You have new or more belly pain. · You have loose stitches, or your incision comes open. · You have symptoms of infection, such as: ¨ Increased pain, swelling, warmth, or redness. ¨ Red streaks leading from the incision. ¨ Pus draining from the incision. ¨ A fever. · You have symptoms of a blood clot in your leg (called a deep vein thrombosis), such as: 
¨ Pain in your calf, back of the knee, thigh, or groin. ¨ Redness and swelling in your leg or groin. Watch closely for changes in your health, and be sure to contact your doctor if: 
· You feel sad, anxious, or hopeless for more than a few days. · You do not get better as expected. Where can you learn more? Go to http://sheree-frank.info/. Enter M806 in the search box to learn more about \" Section: What to Expect at Home. \" Current as of: May 30, 2016 Content Version: 11.1 © 3837-8226 [employer] Care instructions adapted under license by [employer] (which disclaims liability or warranty for this information). If you have questions about a medical condition or this instruction, always ask your healthcare professional. [place] [address] any warranty or liability for your use of this information. Discharge Orders None Introducing [facility]! Sudha Peterson introduces [employer] patient portal. Now you can access parts of your medical record, email your doctor's office, and request medication refills online. 1. In your internet browser, go to [email or web address]. [email or web address] Kid 2. Click on the First Time User? Click Here link in the Sign In box. You will see the New Member Sign Up page. 3. Enter your [employer] Access Code exactly as it appears below. You will not need to use this code after youve completed the sign-up process. If you do not sign up before the expiration date, you must request a new code. · [employer] Access Code: [email or web address] Expires: 5/15/2017  5:02 PM 
 
4.  Enter the last four digits of your Social Security Number (xxxx) and Date of Birth (mm/dd/yyyy) as indicated and click Submit. You will be taken to the next sign-up page. 5. Create a [employer] ID. This will be your [employer] login ID and cannot be changed, so think of one that is secure and easy to remember. 6. Create a [employer] password. You can change your password at any time. 7. Enter your Password Reset Question and Answer. This can be used at a later time if you forget your password. 8. Enter your e-mail address. You will receive e-mail notification when new information is available in [address] 9. Click Sign Up. You can now view and download portions of your medical record. 10. Click the Download Summary menu link to download a portable copy of your medical information. If you have questions, please visit the Frequently Asked Questions section of the [employer] website. Remember, [employer] is NOT to be used for urgent needs. For medical emergencies, dial 911. Now available from your iPhone and Android! General Information Please provide this summary of care documentation to your next provider. Patient Signature:  ____________________________________________________________ Date:  ____________________________________________________________  
  
[facility] Perfect Provider Signature:  ____________________________________________________________ Date:  ____________________________________________________________

## 2017-03-12 NOTE — IP AVS SNAPSHOT
Current Discharge Medication List  
  
START taking these medications Dose & Instructions Dispensing Information Comments Morning Noon Evening Bedtime  
 ibuprofen 200 mg tablet Commonly known as:  MOTRIN IB Your last dose was: Your next dose is:    
   
   
 Dose:  800 mg Take 4 Tabs by mouth every eight (8) hours as needed for Pain. Quantity:  20 Tab Refills:  0  
     
   
   
   
  
 meperidine 50 mg tablet Commonly known as:  DEMEROL Your last dose was: Your next dose is:    
   
   
 Dose:  50 mg Take 1 Tab by mouth every four (4) hours as needed for Pain. Max Daily Amount: 300 mg. Quantity:  20 Tab Refills:  0 ASK your doctor about these medications Dose & Instructions Dispensing Information Comments Morning Noon Evening Bedtime * albuterol 90 mcg/actuation inhaler Commonly known as:  PROAIR HFA Your last dose was: Your next dose is:    
   
   
 Dose:  2 Puff Take 2 Puffs by inhalation every four (4) hours as needed for Wheezing. Quantity:  1 Inhaler Refills:  5  
     
   
   
   
  
 * albuterol 2.5 mg /3 mL (0.083 %) nebulizer solution Commonly known as:  PROVENTIL VENTOLIN Your last dose was: Your next dose is:    
   
   
 Dose:  2.5 mg  
3 mL by Nebulization route every four (4) hours as needed for Wheezing. Quantity:  100 Each Refills:  5  
     
   
   
   
  
 aspirin 81 mg chewable tablet Your last dose was: Your next dose is:    
   
   
 Dose:  81 mg Take 1 Tab by mouth daily. Quantity:  30 Tab Refills:  5 Blood-Glucose Meter monitoring kit Your last dose was: Your next dose is:    
   
   
 Use up to three times per days Quantity:  1 Kit Refills:  0  
     
   
   
   
  
 diphenoxylate-atropine 2.5-0.025 mg per tablet Commonly known as:  LOMOTIL Your last dose was: Your next dose is:    
   
   
 Dose:  1 Tab Take 1 Tab by mouth four (4) times daily as needed for Diarrhea. Max Daily Amount: 4 Tabs. Quantity:  20 Tab Refills:  0  
     
   
   
   
  
 glucose blood VI test strips strip Commonly known as:  ASCENSIA AUTODISC VI, ONE TOUCH ULTRA TEST VI Your last dose was: Your next dose is:    
   
   
 Use up to four times daily Quantity:  100 Strip Refills:  5  
     
   
   
   
  
 glyBURIDE 1.25 mg tablet Commonly known as:  Levester Owens Your last dose was: Your next dose is: Take 1 pill if you have an evening snack Quantity:  30 Tab Refills:  3  
     
   
   
   
  
 labetalol 100 mg tablet Commonly known as:  Hugo Jay Your last dose was: Your next dose is:    
   
   
 Dose:  100 mg Take 100 mg by mouth two (2) times a day. Refills:  0 Lancets Misc Your last dose was: Your next dose is:    
   
   
 Use up to three times per days;  
 Quantity:  1 Each Refills:  11  
     
   
   
   
  
 metFORMIN 1,000 mg tablet Commonly known as:  GLUCOPHAGE Your last dose was: Your next dose is:    
   
   
 Dose:  500 mg Take 0.5 Tabs by mouth two (2) times a day. Quantity:  60 Tab Refills:  5 PEN NEEDLE 31 gauge x 5/16\" Ndle Generic drug:  Insulin Needles (Disposable) Your last dose was: Your next dose is:    
   
   
  Refills:  0  
     
   
   
   
  
 prenatal vitamin 27 mg iron- 800 mcg Tab tablet Your last dose was: Your next dose is:    
   
   
 daily. Refills:  0 SITagliptin 100 mg tablet Commonly known as:  Carlitos Davis Your last dose was: Your next dose is:    
   
   
 Dose:  100 mg Take 1 Tab by mouth daily. Quantity:  30 Tab Refills:  5  
     
   
   
   
  
 * Notice:   This list has 2 medication(s) that are the same as other medications prescribed for you. Read the directions carefully, and ask your doctor or other care provider to review them with you. Where to Get Your Medications Information on where to get these meds will be given to you by the nurse or doctor. ! Ask your nurse or doctor about these medications  
  ibuprofen 200 mg tablet  
 meperidine 50 mg tablet

## 2017-03-12 NOTE — H&P
History & Physical    Name: Nory Polanco MRN: [id number]  SSN: [id number]    YOB: 1985  Age: 33 y.o. Sex: female      Subjective:     Estimated Date of Delivery: 3/26/17  OB History    Para Term  AB SAB TAB Ectopic Multiple Living   1               # Outcome Date GA Lbr Byron/2nd Weight Sex Delivery Anes PTL Lv   1 Current                   Ms. Mirna Caputo is admitted with pregnancy at 38w0d for induction of labor due to diabetes and hypertension. Prenatal course was complicated by chronic hypertension and diabetes - Type 2. Please see prenatal records for details. Past Medical History:   Diagnosis Date    Asthma     Diabetes ([address]     Hypertension      Past Surgical History:   Procedure Laterality Date    HX TYMPANOSTOMY       Social History     Occupational History    disabled      Social History Main Topics    Smoking status: Current Every Day Smoker     Packs/day: 0.10     Types: Cigarettes     Start date: 3/23/2004     Last attempt to quit: 2017    Smokeless tobacco: Never Used    Alcohol use No    Drug use: No    Sexual activity: Yes     Partners: Male     Family History   Problem Relation Age of Onset    Diabetes Father     Heart Disease Father      CAD    Hypertension Father     Diabetes Mother     Hypertension Mother     Liver Disease Mother     Deep Vein Thrombosis Mother     Pulmonary Embolism Mother     Hypertension Sister     Deep Vein Thrombosis Sister     Pulmonary Embolism Sister     Diabetes Maternal Uncle     Cancer Maternal Grandmother      Lung Cancer       Allergies   Allergen Reactions    Mirapex [Pramipexole] Drowsiness    Morphine Rash    Tramadol Other (comments)     headache     Prior to Admission medications    Medication Sig Start Date End Date Taking?  Authorizing Provider   glyBURIDE (DIABETA) 1.25 mg tablet Take 1 pill if you have an evening snack 3/8/17  Yes Lavon Valdez MD   aspirin 81 mg chewable tablet Take 1 Tab by mouth daily. 2/6/17  Yes Paula Arana MD   metFORMIN (GLUCOPHAGE) 1,000 mg tablet Take 0.5 Tabs by mouth two (2) times a day. 1/18/17  Yes Paula Arana MD   SITagliptin (JANUVIA) 100 mg tablet Take 1 Tab by mouth daily. 1/18/17  Yes Paula Arana MD   labetalol (NORMODYNE) 100 mg tablet Take 100 mg by mouth two (2) times a day. 8/1/16  Yes Historical Provider   PRENATAL VIT#96/FERROUS FUM/FA (PRENATAL VITAMIN) 27 mg iron- 800 mcg tab tablet daily. 8/1/16  Yes Historical Provider   Lancets misc Use up to three times per days; 7/6/16  Yes Paula Arana MD   glucose blood VI test strips (ASCENSIA AUTODISC VI, ONE TOUCH ULTRA TEST VI) strip Use up to four times daily 7/6/16  Yes Paula Arana MD   Blood-Glucose Meter monitoring kit Use up to three times per days 6/25/16  Yes Paula Arana MD   PEN NEEDLE 31 X 5/16 \" ndle  5/12/15  Yes Sherwin Cesar MD   albuterol (PROVENTIL VENTOLIN) 2.5 mg /3 mL (0.083 %) nebulizer solution 3 mL by Nebulization route every four (4) hours as needed for Wheezing. 1/11/17   Paula Arana MD   diphenoxylate-atropine (LOMOTIL) 2.5-0.025 mg per tablet Take 1 Tab by mouth four (4) times daily as needed for Diarrhea. Max Daily Amount: 4 Tabs. 12/24/16   Darryl Luna DO   albuterol [facility] HFA) 90 mcg/actuation inhaler Take 2 Puffs by inhalation every four (4) hours as needed for Wheezing. 3/23/16   Paula Arana MD        Review of Systems: A comprehensive review of systems was negative except for that written in the History of Present Illness. Objective:     Vitals:  Vitals:    03/12/17 1720   Weight: 120.2 kg (265 lb)   Height: 5' 6\" (1.676 m)        Physical Exam:  Patient without distress.   Abdomen: soft, nontender  Fundus: soft and non tender  Perineum: blood absent, amniotic fluid absent  Cervical Exam: 1 cm dilated    50% effaced    -3 station    Presenting Part: cephalic  Cervical Position: posterior  Consistency: Firm    Lower Extremities:  - Edema 2+  Membranes:  Intact  Fetal Heart Rate: Reactive Prenatal Labs:   No results found for: ABORH, RUBELLAEXT, HBSAGEXT, HIVEXT, RPREXT, GONNOEXT, CHLAMEXT, ABORHEXT, RUBELLAEXT, GRBSEXT, HBSAGEXT, HIVEXT, RPREXT, GONNOEXT, CHLAMEXT    Impression/Plan:     Active Problems:    Pregnancy (3/12/2017)         Plan: Admit for induction of labor. Group B Strep positive, will treat prophylactically with penicillin. 1.  Induction for DM and HTN  2. Continue labetalol  3.   GBS +    Signed By:  Duglas Cabrera MD     March 12, 2017

## 2017-03-12 NOTE — PROGRESS NOTES
1915 - verbal bedside shift report from [facility]. Assumed care of pt at this time. 1935 - in to see pt - introduced self, reviewed POC, VS taken and WNL. Pt getting ready to eat dinner. [id number] - attempting to trace FHR, adjusted. 2310 - pt c/o pain - explained the cervadil can make you have cramps. Will check at 0000 when hanging PCN to see if pt needs pain medication. 0005 - pt called thinks she has SROM. 0020 Stan Rosales RN to room - grossly ruptured. IV bolus started for pt to get epidural.    0105  - Cervadil removed. 0120 - soft mattress removed. Pt sitting    0125 - Dr. Garham Pool notified of IV bolus complete - states must go to ICU before coming to place epidural    0145 - pt remains sitting on bedside    0147 - Dr Graham Pool in time out completed      0730 - verbal bedside shift report to Radha Soto RN. Turned over care of pt at this time.

## 2017-03-12 NOTE — LACTATION NOTE
Discussed with mother her plan for feeding. Reviewed the benefits of exclusive breast milk feeding during the hospital stay. Informed mother of the risks of using formula to supplement in the first few days of life as well as the benefits of successful breast milk feeding; referred mother to the handout in her admission packet related to these topics. Mother acknowledges understanding of information reviewed and states that it is her plan to formula feed exclusively her infant. Will support her choice and offer additional information as needed.

## 2017-03-12 NOTE — IP AVS SNAPSHOT
Summary of Care Report The Summary of Care report has been created to help improve care coordination. Users with access to [employer] or [employer] (Web-based application) may access additional patient information including the Discharge Summary. If you are not currently a [facility] user and need more information, please call the number listed below in the [place] [address] section and ask to be connected with Medical Records. Facility Information Name Address Phone [address] [address] [phone] Patient Information Patient Name Sex BOSTON Hernández ([id number]) Female 1985 Discharge Information Admitting Provider Service Area Unit Dougie De La Fuente MD / [phone] [address] [facility] 3 Mother Infant / [phone] Discharge Provider Discharge Date/Time Discharge Disposition Destination (none) 3/16/2017 (Pending) AHR (none) Patient Language Language ENGLISH [13] Problem List as of 3/16/2017  Date Reviewed: 2017 Codes Priority Class Noted - Resolved Hyperglycemia due to type 2 diabetes mellitus ([facility] ICD-10-CM: E11.65 ICD-9-CM: 250.00   3/23/2016 - Present Hypertension ICD-10-CM: I10 
ICD-9-CM: 401.9   3/23/2016 - Present Asthma ICD-10-CM: O40.559 ICD-9-CM: 493.90   3/23/2016 - Present Bleeding nose ICD-10-CM: R04.0 ICD-9-CM: 784.7   2017 - Present Back pain in pregnancy ICD-10-CM: O26.899 ICD-9-CM: 646.80, 724.5  Present on Admission 2017 - Present Diarrhea, unspecified ICD-10-CM: R19.7 ICD-9-CM: 787.91  Present on Admission 2017 - Present Decreased fetal movement during pregnancy in third trimester, antepartum ICD-10-CM: T22.8289 ICD-9-CM: 655.73  Present on Admission 2017 - Present Pregnancy ICD-10-CM: Z33.1 ICD-9-CM: V22.2   3/12/2017 - Present You are allergic to the following Allergen Reactions Mirapex (Pramipexole) Drowsiness Morphine Rash Tramadol Other (comments)  
 headache Current Discharge Medication List  
  
START taking these medications Dose & Instructions Dispensing Information Comments  
 ibuprofen 200 mg tablet Commonly known as:  MOTRIN IB Dose:  800 mg Take 4 Tabs by mouth every eight (8) hours as needed for Pain. Quantity:  20 Tab Refills:  0  
   
 meperidine 50 mg tablet Commonly known as:  DEMEROL Dose:  50 mg Take 1 Tab by mouth every four (4) hours as needed for Pain. Max Daily Amount: 300 mg. Quantity:  20 Tab Refills:  0 ASK your doctor about these medications Dose & Instructions Dispensing Information Comments * albuterol 90 mcg/actuation inhaler Commonly known as:  PROAIR HFA Dose:  2 Puff Take 2 Puffs by inhalation every four (4) hours as needed for Wheezing. Quantity:  1 Inhaler Refills:  5  
   
 * albuterol 2.5 mg /3 mL (0.083 %) nebulizer solution Commonly known as:  PROVENTIL VENTOLIN Dose:  2.5 mg  
3 mL by Nebulization route every four (4) hours as needed for Wheezing. Quantity:  100 Each Refills:  5  
   
 aspirin 81 mg chewable tablet Dose:  81 mg Take 1 Tab by mouth daily. Quantity:  30 Tab Refills:  5 Blood-Glucose Meter monitoring kit Use up to three times per days Quantity:  1 Kit Refills:  0  
   
 diphenoxylate-atropine 2.5-0.025 mg per tablet Commonly known as:  LOMOTIL Dose:  1 Tab Take 1 Tab by mouth four (4) times daily as needed for Diarrhea. Max Daily Amount: 4 Tabs. Quantity:  20 Tab Refills:  0  
   
 glucose blood VI test strips strip Commonly known as:  ASCENSIA AUTODISC VI, ONE TOUCH ULTRA TEST VI  
 Use up to four times daily Quantity:  100 Strip Refills:  5  
   
 glyBURIDE 1.25 mg tablet Commonly known as:  Isha Ohms Take 1 pill if you have an evening snack Quantity:  30 Tab Refills:  3  
   
 labetalol 100 mg tablet Commonly known as:  [place] Bump Dose:  100 mg Take 100 mg by mouth two (2) times a day. Refills:  0 Lancets Misc Use up to three times per days;  
 Quantity:  1 Each Refills:  11  
   
 metFORMIN 1,000 mg tablet Commonly known as:  GLUCOPHAGE Dose:  500 mg Take 0.5 Tabs by mouth two (2) times a day. Quantity:  60 Tab Refills:  5 PEN NEEDLE 31 gauge x \" Ndle Generic drug:  Insulin Needles (Disposable) Refills:  0  
   
 prenatal vitamin 27 mg iron- 800 mcg Tab tablet  
 daily. Refills:  0 SITagliptin 100 mg tablet Commonly known as:  Merline Pesa Dose:  100 mg Take 1 Tab by mouth daily. Quantity:  30 Tab Refills:  5  
   
 * Notice: This list has 2 medication(s) that are the same as other medications prescribed for you. Read the directions carefully, and ask your doctor or other care provider to review them with you. Current Immunizations Name Date Influenza Vaccine 10/1/2016 MMR 3/16/2017 Tdap 2016 Surgery Information ID Date/Time Status Primary Surgeon All Procedures Location [id number] 3/13/2017 Unptu REZA [facility] - DO NOT SCHEDULE    
 [id number] 3/13/2017 Unptu Fraser MD  SECTION [facility] L&D OR Follow-up Information Follow up With Details Comments Contact Info Hiram Bustamante MD   [address] [place] [address] Primary Care [address] [place] [address] 
[phone] Discharge Instructions  Section: What to Expect at [facility] Your Recovery A  section, or , is surgery to deliver your baby through a cut, called an incision, that the doctor makes in your lower belly and uterus.  
You may have some pain in your lower belly and need pain medicine for 1 to 2 weeks. You can expect some vaginal bleeding for several weeks. You will probably need about 6 weeks to fully recover. It is important to take it easy while the incision is healing. Avoid heavy lifting, strenuous activities, or exercises that strain the belly muscles while you are recovering. Ask a family member or friend for help with housework, cooking, and shopping. This care sheet gives you a general idea about how long it will take for you to recover. But each person recovers at a different pace. Follow the steps below to get better as quickly as possible. How can you care for yourself at home? Activity · Rest when you feel tired. Getting enough sleep will help you recover. · Try to walk each day. Start by walking a little more than you did the day before. Bit by bit, increase the amount you walk. Walking boosts blood flow and helps prevent pneumonia, constipation, and blood clots. · Avoid strenuous activities, such as bicycle riding, jogging, weightlifting, and aerobic exercise, for 6 weeks or until your doctor says it is okay. · Until your doctor says it is okay, do not lift anything heavier than your baby. · Do not do sit-ups or other exercises that strain the belly muscles for 6 weeks or until your doctor says it is okay. · Hold a pillow over your incision when you cough or take deep breaths. This will support your belly and decrease your pain. · You may shower as usual. Pat the incision dry when you are done. · You will have some vaginal bleeding. Wear sanitary pads. Do not douche or use tampons until your doctor says it is okay. · Ask your doctor when you can drive again. · You will probably need to take at least 6 weeks off work. It depends on the type of work you do and how you feel. · Ask your doctor when it is okay for you to have sex. Diet · You can eat your normal diet. If your stomach is upset, try bland, low-fat foods like plain rice, broiled chicken, toast, and yogurt. · Drink plenty of fluids (unless your doctor tells you not to). · You may notice that your bowel movements are not regular right after your surgery. This is common. Try to avoid constipation and straining with bowel movements. You may want to take a fiber supplement every day. If you have not had a bowel movement after a couple of days, ask your doctor about taking a mild laxative. · If you are breastfeeding, do not drink any alcohol. Medicines · Your doctor will tell you if and when you can restart your medicines. He or she will also give you instructions about taking any new medicines. · If you take blood thinners, such as warfarin (Coumadin), clopidogrel (Plavix), or aspirin, be sure to talk to your doctor. He or she will tell you if and when to start taking those medicines again. Make sure that you understand exactly what your doctor wants you to do. · Take pain medicines exactly as directed. ¨ If the doctor gave you a prescription medicine for pain, take it as prescribed. ¨ If you are not taking a prescription pain medicine, ask your doctor if you can take an over-the-counter medicine. · If you think your pain medicine is making you sick to your stomach: 
¨ Take your medicine after meals (unless your doctor has told you not to). ¨ Ask your doctor for a different pain medicine. · If your doctor prescribed antibiotics, take them as directed. Do not stop taking them just because you feel better. You need to take the full course of antibiotics. Incision care · If you have strips of tape on the incision, leave the tape on for a week or until it falls off. · Wash the area daily with warm, soapy water, and pat it dry. Don't use hydrogen peroxide or alcohol, which can slow healing. You may cover the area with a gauze bandage if it weeps or rubs against clothing. Change the bandage every day. · Keep the area clean and dry. Other instructions · If you breastfeed your baby, you may be more comfortable while you are healing if you place the baby so that he or she is not resting on your belly. Try tucking your baby under your arm, with his or her body along the side you will be feeding on. Support your baby's upper body with your arm. With that hand you can control your baby's head to bring his or her mouth to your breast. This is sometimes called the football hold. Follow-up care is a key part of your treatment and safety. Be sure to make and go to all appointments, and call your doctor if you are having problems. It's also a good idea to know your test results and keep a list of the medicines you take. When should you call for help? Call 911 anytime you think you may need emergency care. For example, call if: 
· You passed out (lost consciousness). · You have symptoms of a blood clot in your lung (called a pulmonary embolism). These may include: 
¨ Sudden chest pain. ¨ Trouble breathing. ¨ Coughing up blood. · You have thoughts of harming yourself, your baby, or another person. Call your doctor now or seek immediate medical care if: 
· You have severe vaginal bleeding. This means that you are soaking through a pad every hour for 2 or more hours. · You are dizzy or lightheaded, or you feel like you may faint. · You have new or more belly pain. · You have loose stitches, or your incision comes open. · You have symptoms of infection, such as: 
¨ Increased pain, swelling, warmth, or redness. ¨ Red streaks leading from the incision. ¨ Pus draining from the incision. ¨ A fever. · You have symptoms of a blood clot in your leg (called a deep vein thrombosis), such as: 
¨ Pain in your calf, back of the knee, thigh, or groin. ¨ Redness and swelling in your leg or groin. Watch closely for changes in your health, and be sure to contact your doctor if: 
· You feel sad, anxious, or hopeless for more than a few days. · You do not get better as expected. Where can you learn more? Go to http://sheree-frank.info/. Enter M806 in the search box to learn more about \" Section: What to Expect at Home. \" Current as of: May 30, 2016 Content Version: 11.1 © 7870-9907 [employer]. Care instructions adapted under license by [employer] (which disclaims liability or warranty for this information). If you have questions about a medical condition or this instruction, always ask your healthcare professional. [place] [address] any warranty or liability for your use of this information. Chart Review Routing History Recipient Method Report Sent By Suman Skaggs MD  
Phone: [phone] In [address] [place] [address] MD Anthony [[id number]] 2016  8:51 AM   
 Provider Unknown, MD  
Patient not available to ask [address] IP Auto Routed Notes Fuad Rosales MD [[id number]] 2017  8:33 AM 2017 Provider Unknown, MD  
Patient not available to ask [address] IP Auto Routed Notes Nabil Vasquez MD [[id number]] 2017  8:18 AM 2017 Tiffany Vargas MD  
Phone: [phone] In Basket IP Auto Routed Notes Sami Sevilla MD [[id number]] 2017  3:07 AM 2017 Tiffany Vargas MD  
Phone: [phone] In Basket IP Auto Routed Notes Sami Sevilla MD [[id number]] 2017  6:29 PM 2017 Tiffany Vargas MD  
Phone: [phone] In Basket IP Auto Routed Notes Dario Serrato MD [[id number]] 2017  1:52 AM 2017 Tiffany Vargas MD  
Phone: [phone] In Basket IP Auto Routed Notes Fuad Rosales MD [[id number]] 3/12/2017  6:21 PM 2017

## 2017-03-12 NOTE — PROGRESS NOTES
18 Dr. Robson Laughlin in to see pt & to do SVE & place cervidil. 1/50/-3 very posterior. Fetus very difficult to trace. START penicillin at 2400. Blood sugars tomorrow.

## 2017-03-13 ENCOUNTER — ANESTHESIA EVENT (OUTPATIENT)
Dept: LABOR AND DELIVERY | Age: 32
DRG: 540 | End: 2017-03-13
Payer: MEDICAID

## 2017-03-13 ENCOUNTER — ANESTHESIA (OUTPATIENT)
Dept: LABOR AND DELIVERY | Age: 32
DRG: 540 | End: 2017-03-13
Payer: MEDICAID

## 2017-03-13 LAB
GLUCOSE BLD STRIP.AUTO-MCNC: 79 MG/DL (ref 65–100)
GLUCOSE BLD STRIP.AUTO-MCNC: 81 MG/DL (ref 65–100)
GLUCOSE BLD STRIP.AUTO-MCNC: 82 MG/DL (ref 65–100)
GLUCOSE BLD STRIP.AUTO-MCNC: 88 MG/DL (ref 65–100)
GLUCOSE BLD STRIP.AUTO-MCNC: 88 MG/DL (ref 65–100)
GLUCOSE BLD STRIP.AUTO-MCNC: 96 MG/DL (ref 65–100)
GLUCOSE BLD STRIP.AUTO-MCNC: 98 MG/DL (ref 65–100)
SERVICE CMNT-IMP: NORMAL

## 2017-03-13 PROCEDURE — 77030014125 HC TY EPDRL BBMI -B: Performed by: ANESTHESIOLOGY

## 2017-03-13 PROCEDURE — 75410000002 HC LABOR FEE PER 1 HR

## 2017-03-13 PROCEDURE — 76010000392 HC C SECN EA ADDL 0.5 HR: Performed by: SPECIALIST

## 2017-03-13 PROCEDURE — 74011250636 HC RX REV CODE- 250/636

## 2017-03-13 PROCEDURE — 65410000002 HC RM PRIVATE OB

## 2017-03-13 PROCEDURE — 76060000078 HC EPIDURAL ANESTHESIA: Performed by: SPECIALIST

## 2017-03-13 PROCEDURE — 77030018836 HC SOL IRR NACL ICUM -A

## 2017-03-13 PROCEDURE — 77030011640 HC PAD GRND REM COVD -A

## 2017-03-13 PROCEDURE — 77030018809 HC RETRCTR ALXSO DISP AMR -B

## 2017-03-13 PROCEDURE — 75410000003 HC RECOV DEL/VAG/CSECN EA 0.5 HR

## 2017-03-13 PROCEDURE — 82962 GLUCOSE BLOOD TEST: CPT

## 2017-03-13 PROCEDURE — 77030008459 HC STPLR SKN COOP -B

## 2017-03-13 PROCEDURE — 77010026065 HC OXYGEN MINIMUM MEDICAL AIR

## 2017-03-13 PROCEDURE — 74011250637 HC RX REV CODE- 250/637: Performed by: OBSTETRICS & GYNECOLOGY

## 2017-03-13 PROCEDURE — 77030029192 HC DRSG WND NGP PICO S&N -C

## 2017-03-13 PROCEDURE — 76060000033 HC ANESTHESIA 1 TO 1.5 HR: Performed by: SPECIALIST

## 2017-03-13 PROCEDURE — 74011250636 HC RX REV CODE- 250/636: Performed by: ANESTHESIOLOGY

## 2017-03-13 PROCEDURE — 76060000078 HC EPIDURAL ANESTHESIA

## 2017-03-13 PROCEDURE — 77030032060 HC PWDR HEMSTAT ARISTA ASRB 3GM BARD -C

## 2017-03-13 PROCEDURE — 77030033542 HC RETRCTR RETNTS PANICLS GQSM -B

## 2017-03-13 PROCEDURE — 74011000250 HC RX REV CODE- 250

## 2017-03-13 PROCEDURE — 88307 TISSUE EXAM BY PATHOLOGIST: CPT | Performed by: SPECIALIST

## 2017-03-13 PROCEDURE — 74011250636 HC RX REV CODE- 250/636: Performed by: SPECIALIST

## 2017-03-13 PROCEDURE — 77030031139 HC SUT VCRL2 J&J -A

## 2017-03-13 PROCEDURE — 77030032490 HC SLV COMPR SCD KNE COVD -B

## 2017-03-13 PROCEDURE — 76010000391 HC C SECN FIRST 1 HR: Performed by: SPECIALIST

## 2017-03-13 PROCEDURE — 74011000258 HC RX REV CODE- 258

## 2017-03-13 PROCEDURE — 74011250636 HC RX REV CODE- 250/636: Performed by: OBSTETRICS & GYNECOLOGY

## 2017-03-13 RX ORDER — BUPIVACAINE HYDROCHLORIDE AND EPINEPHRINE 2.5; 5 MG/ML; UG/ML
INJECTION, SOLUTION EPIDURAL; INFILTRATION; INTRACAUDAL; PERINEURAL
Status: DISCONTINUED
Start: 2017-03-13 | End: 2017-03-13

## 2017-03-13 RX ORDER — OXYTOCIN IN 5 % DEXTROSE 30/500 ML
50 PLASTIC BAG, INJECTION (ML) INTRAVENOUS ONCE
Status: COMPLETED | OUTPATIENT
Start: 2017-03-13 | End: 2017-03-13

## 2017-03-13 RX ORDER — FENTANYL CITRATE 50 UG/ML
INJECTION, SOLUTION INTRAMUSCULAR; INTRAVENOUS AS NEEDED
Status: DISCONTINUED | OUTPATIENT
Start: 2017-03-13 | End: 2017-03-13

## 2017-03-13 RX ORDER — FENTANYL/BUPIVACAINE/NS/PF 2-1250MCG
PREFILLED PUMP RESERVOIR EPIDURAL
Status: COMPLETED
Start: 2017-03-13 | End: 2017-03-13

## 2017-03-13 RX ORDER — FENTANYL CITRATE 50 UG/ML
INJECTION, SOLUTION INTRAMUSCULAR; INTRAVENOUS
Status: DISPENSED
Start: 2017-03-13 | End: 2017-03-13

## 2017-03-13 RX ORDER — SODIUM CHLORIDE 0.9 % (FLUSH) 0.9 %
5-10 SYRINGE (ML) INJECTION AS NEEDED
Status: DISCONTINUED | OUTPATIENT
Start: 2017-03-13 | End: 2017-03-13 | Stop reason: HOSPADM

## 2017-03-13 RX ORDER — BUPIVACAINE HYDROCHLORIDE AND EPINEPHRINE 2.5; 5 MG/ML; UG/ML
INJECTION, SOLUTION EPIDURAL; INFILTRATION; INTRACAUDAL; PERINEURAL AS NEEDED
Status: DISCONTINUED | OUTPATIENT
Start: 2017-03-13 | End: 2017-03-13 | Stop reason: HOSPADM

## 2017-03-13 RX ORDER — SODIUM CHLORIDE 0.9 % (FLUSH) 0.9 %
5-10 SYRINGE (ML) INJECTION EVERY 8 HOURS
Status: DISCONTINUED | OUTPATIENT
Start: 2017-03-13 | End: 2017-03-13 | Stop reason: HOSPADM

## 2017-03-13 RX ORDER — LIDOCAINE HYDROCHLORIDE AND EPINEPHRINE 20; 5 MG/ML; UG/ML
INJECTION, SOLUTION EPIDURAL; INFILTRATION; INTRACAUDAL; PERINEURAL
Status: DISPENSED
Start: 2017-03-13 | End: 2017-03-14

## 2017-03-13 RX ORDER — SODIUM CHLORIDE, SODIUM LACTATE, POTASSIUM CHLORIDE, CALCIUM CHLORIDE 600; 310; 30; 20 MG/100ML; MG/100ML; MG/100ML; MG/100ML
INJECTION, SOLUTION INTRAVENOUS
Status: DISCONTINUED | OUTPATIENT
Start: 2017-03-13 | End: 2017-03-13 | Stop reason: HOSPADM

## 2017-03-13 RX ORDER — FENTANYL CITRATE 50 UG/ML
INJECTION, SOLUTION INTRAMUSCULAR; INTRAVENOUS AS NEEDED
Status: DISCONTINUED | OUTPATIENT
Start: 2017-03-13 | End: 2017-03-13 | Stop reason: HOSPADM

## 2017-03-13 RX ORDER — FENTANYL/BUPIVACAINE/NS/PF 2-1250MCG
PREFILLED PUMP RESERVOIR EPIDURAL
Status: DISPENSED
Start: 2017-03-13 | End: 2017-03-13

## 2017-03-13 RX ORDER — OXYTOCIN 10 [USP'U]/ML
INJECTION, SOLUTION INTRAMUSCULAR; INTRAVENOUS AS NEEDED
Status: DISCONTINUED | OUTPATIENT
Start: 2017-03-13 | End: 2017-03-13 | Stop reason: HOSPADM

## 2017-03-13 RX ORDER — HYDROMORPHONE HYDROCHLORIDE 2 MG/ML
INJECTION, SOLUTION INTRAMUSCULAR; INTRAVENOUS; SUBCUTANEOUS AS NEEDED
Status: DISCONTINUED | OUTPATIENT
Start: 2017-03-13 | End: 2017-03-13 | Stop reason: HOSPADM

## 2017-03-13 RX ORDER — BUPIVACAINE HYDROCHLORIDE AND EPINEPHRINE 2.5; 5 MG/ML; UG/ML
INJECTION, SOLUTION EPIDURAL; INFILTRATION; INTRACAUDAL; PERINEURAL
Status: DISPENSED
Start: 2017-03-13 | End: 2017-03-13

## 2017-03-13 RX ORDER — CEFAZOLIN SODIUM IN 0.9 % NACL 2 G/100 ML
PLASTIC BAG, INJECTION (ML) INTRAVENOUS
Status: COMPLETED
Start: 2017-03-13 | End: 2017-03-13

## 2017-03-13 RX ORDER — CEFAZOLIN SODIUM IN 0.9 % NACL 2 G/100 ML
PLASTIC BAG, INJECTION (ML) INTRAVENOUS AS NEEDED
Status: DISCONTINUED | OUTPATIENT
Start: 2017-03-13 | End: 2017-03-13 | Stop reason: HOSPADM

## 2017-03-13 RX ORDER — ONDANSETRON 2 MG/ML
INJECTION INTRAMUSCULAR; INTRAVENOUS AS NEEDED
Status: DISCONTINUED | OUTPATIENT
Start: 2017-03-13 | End: 2017-03-13 | Stop reason: HOSPADM

## 2017-03-13 RX ORDER — LIDOCAINE HYDROCHLORIDE 10 MG/ML
INJECTION INFILTRATION; PERINEURAL AS NEEDED
Status: DISCONTINUED | OUTPATIENT
Start: 2017-03-13 | End: 2017-03-13 | Stop reason: HOSPADM

## 2017-03-13 RX ORDER — CEFAZOLIN SODIUM IN 0.9 % NACL 2 G/100 ML
2 PLASTIC BAG, INJECTION (ML) INTRAVENOUS EVERY 8 HOURS
Status: COMPLETED | OUTPATIENT
Start: 2017-03-14 | End: 2017-03-14

## 2017-03-13 RX ORDER — OXYTOCIN/RINGER'S LACTATE 20/1000 ML
PLASTIC BAG, INJECTION (ML) INTRAVENOUS
Status: DISCONTINUED | OUTPATIENT
Start: 2017-03-13 | End: 2017-03-13 | Stop reason: HOSPADM

## 2017-03-13 RX ORDER — FENTANYL/BUPIVACAINE/NS/PF 2-1250MCG
1-16 PREFILLED PUMP RESERVOIR EPIDURAL CONTINUOUS
Status: DISCONTINUED | OUTPATIENT
Start: 2017-03-13 | End: 2017-03-13 | Stop reason: HOSPADM

## 2017-03-13 RX ORDER — FENTANYL CITRATE 50 UG/ML
INJECTION, SOLUTION INTRAMUSCULAR; INTRAVENOUS
Status: DISCONTINUED
Start: 2017-03-13 | End: 2017-03-13

## 2017-03-13 RX ORDER — METOPROLOL TARTRATE 5 MG/5ML
INJECTION INTRAVENOUS AS NEEDED
Status: DISCONTINUED | OUTPATIENT
Start: 2017-03-13 | End: 2017-03-13 | Stop reason: HOSPADM

## 2017-03-13 RX ORDER — LIDOCAINE HYDROCHLORIDE AND EPINEPHRINE 20; 5 MG/ML; UG/ML
INJECTION, SOLUTION EPIDURAL; INFILTRATION; INTRACAUDAL; PERINEURAL AS NEEDED
Status: DISCONTINUED | OUTPATIENT
Start: 2017-03-13 | End: 2017-03-13 | Stop reason: HOSPADM

## 2017-03-13 RX ADMIN — Medication 2 G: at 21:09

## 2017-03-13 RX ADMIN — LIDOCAINE HYDROCHLORIDE 5 ML: 10 INJECTION INFILTRATION; PERINEURAL at 21:57

## 2017-03-13 RX ADMIN — PENICILLIN G POTASSIUM 2.5 MILLION UNITS: 20000000 POWDER, FOR SOLUTION INTRAVENOUS at 12:04

## 2017-03-13 RX ADMIN — Medication 10 ML: at 09:10

## 2017-03-13 RX ADMIN — FENTANYL CITRATE 50 MCG: 50 INJECTION, SOLUTION INTRAMUSCULAR; INTRAVENOUS at 21:39

## 2017-03-13 RX ADMIN — OXYTOCIN 5 UNITS: 10 INJECTION, SOLUTION INTRAMUSCULAR; INTRAVENOUS at 21:33

## 2017-03-13 RX ADMIN — LIDOCAINE HYDROCHLORIDE AND EPINEPHRINE 10 ML: 20; 5 INJECTION, SOLUTION EPIDURAL; INFILTRATION; INTRACAUDAL; PERINEURAL at 21:00

## 2017-03-13 RX ADMIN — LIDOCAINE HYDROCHLORIDE 3 ML: 10 INJECTION INFILTRATION; PERINEURAL at 01:48

## 2017-03-13 RX ADMIN — HYDROMORPHONE HYDROCHLORIDE 1 MG: 2 INJECTION, SOLUTION INTRAMUSCULAR; INTRAVENOUS; SUBCUTANEOUS at 22:16

## 2017-03-13 RX ADMIN — FENTANYL 0.2 MG/100ML-BUPIV 0.125%-NACL 0.9% EPIDURAL INJ 12 ML/HR: 2/0.125 SOLUTION at 02:22

## 2017-03-13 RX ADMIN — Medication 1 G: at 22:43

## 2017-03-13 RX ADMIN — SODIUM CHLORIDE, SODIUM LACTATE, POTASSIUM CHLORIDE, AND CALCIUM CHLORIDE 75 ML/HR: 600; 310; 30; 20 INJECTION, SOLUTION INTRAVENOUS at 23:01

## 2017-03-13 RX ADMIN — PENICILLIN G POTASSIUM 2.5 MILLION UNITS: 20000000 POWDER, FOR SOLUTION INTRAVENOUS at 16:03

## 2017-03-13 RX ADMIN — SODIUM CHLORIDE, SODIUM LACTATE, POTASSIUM CHLORIDE, AND CALCIUM CHLORIDE 125 ML/HR: 600; 310; 30; 20 INJECTION, SOLUTION INTRAVENOUS at 20:12

## 2017-03-13 RX ADMIN — SODIUM CHLORIDE, SODIUM LACTATE, POTASSIUM CHLORIDE, AND CALCIUM CHLORIDE 125 ML/HR: 600; 310; 30; 20 INJECTION, SOLUTION INTRAVENOUS at 15:15

## 2017-03-13 RX ADMIN — FENTANYL CITRATE 50 MCG: 50 INJECTION, SOLUTION INTRAMUSCULAR; INTRAVENOUS at 17:33

## 2017-03-13 RX ADMIN — FENTANYL 0.2 MG/100ML-BUPIV 0.125%-NACL 0.9% EPIDURAL INJ 16 ML/HR: 2/0.125 SOLUTION at 14:54

## 2017-03-13 RX ADMIN — FENTANYL 0.2 MG/100ML-BUPIV 0.125%-NACL 0.9% EPIDURAL INJ 12 ML/HR: 2/0.125 SOLUTION at 09:30

## 2017-03-13 RX ADMIN — SODIUM CHLORIDE, SODIUM LACTATE, POTASSIUM CHLORIDE, AND CALCIUM CHLORIDE 125 ML/HR: 600; 310; 30; 20 INJECTION, SOLUTION INTRAVENOUS at 18:09

## 2017-03-13 RX ADMIN — PENICILLIN G POTASSIUM 2.5 MILLION UNITS: 20000000 POWDER, FOR SOLUTION INTRAVENOUS at 04:00

## 2017-03-13 RX ADMIN — Medication 4 MILLI-UNITS/MIN: at 10:40

## 2017-03-13 RX ADMIN — BUPIVACAINE HYDROCHLORIDE AND EPINEPHRINE 3 ML: 2.5; 5 INJECTION, SOLUTION EPIDURAL; INFILTRATION; INTRACAUDAL; PERINEURAL at 02:07

## 2017-03-13 RX ADMIN — PENICILLIN G POTASSIUM 2.5 MILLION UNITS: 20000000 POWDER, FOR SOLUTION INTRAVENOUS at 08:08

## 2017-03-13 RX ADMIN — LABETALOL HYDROCHLORIDE 100 MG: 200 TABLET, FILM COATED ORAL at 09:06

## 2017-03-13 RX ADMIN — SODIUM CHLORIDE, SODIUM LACTATE, POTASSIUM CHLORIDE, AND CALCIUM CHLORIDE 300 ML/HR: 600; 310; 30; 20 INJECTION, SOLUTION INTRAVENOUS at 12:15

## 2017-03-13 RX ADMIN — Medication 1 MILLI-UNITS/MIN: at 09:11

## 2017-03-13 RX ADMIN — SODIUM CHLORIDE, SODIUM LACTATE, POTASSIUM CHLORIDE, AND CALCIUM CHLORIDE 999 ML/HR: 600; 310; 30; 20 INJECTION, SOLUTION INTRAVENOUS at 17:00

## 2017-03-13 RX ADMIN — Medication: at 21:32

## 2017-03-13 RX ADMIN — SODIUM CHLORIDE, SODIUM LACTATE, POTASSIUM CHLORIDE, AND CALCIUM CHLORIDE 125 ML/HR: 600; 310; 30; 20 INJECTION, SOLUTION INTRAVENOUS at 01:29

## 2017-03-13 RX ADMIN — FENTANYL CITRATE 50 MCG: 50 INJECTION, SOLUTION INTRAMUSCULAR; INTRAVENOUS at 17:29

## 2017-03-13 RX ADMIN — ONDANSETRON 4 MG: 2 INJECTION INTRAMUSCULAR; INTRAVENOUS at 21:24

## 2017-03-13 RX ADMIN — Medication 50 ML/HR: at 23:00

## 2017-03-13 RX ADMIN — BUPIVACAINE HYDROCHLORIDE AND EPINEPHRINE 5 ML: 2.5; 5 INJECTION, SOLUTION EPIDURAL; INFILTRATION; INTRACAUDAL; PERINEURAL at 11:05

## 2017-03-13 RX ADMIN — BUPIVACAINE HYDROCHLORIDE AND EPINEPHRINE 4 ML: 2.5; 5 INJECTION, SOLUTION EPIDURAL; INFILTRATION; INTRACAUDAL; PERINEURAL at 17:33

## 2017-03-13 RX ADMIN — FENTANYL CITRATE 100 MCG: 50 INJECTION, SOLUTION INTRAMUSCULAR; INTRAVENOUS at 02:09

## 2017-03-13 RX ADMIN — LIDOCAINE HYDROCHLORIDE 3 ML: 10 INJECTION INFILTRATION; PERINEURAL at 01:51

## 2017-03-13 RX ADMIN — SODIUM CHLORIDE, SODIUM LACTATE, POTASSIUM CHLORIDE, AND CALCIUM CHLORIDE 999 ML/HR: 600; 310; 30; 20 INJECTION, SOLUTION INTRAVENOUS at 12:32

## 2017-03-13 RX ADMIN — FENTANYL CITRATE 50 MCG: 50 INJECTION, SOLUTION INTRAMUSCULAR; INTRAVENOUS at 21:32

## 2017-03-13 RX ADMIN — BUPIVACAINE HYDROCHLORIDE AND EPINEPHRINE 4 ML: 2.5; 5 INJECTION, SOLUTION EPIDURAL; INFILTRATION; INTRACAUDAL; PERINEURAL at 17:29

## 2017-03-13 RX ADMIN — FENTANYL CITRATE 100 MCG: 50 INJECTION, SOLUTION INTRAMUSCULAR; INTRAVENOUS at 22:07

## 2017-03-13 RX ADMIN — PENICILLIN G POTASSIUM 2.5 MILLION UNITS: 20000000 POWDER, FOR SOLUTION INTRAVENOUS at 20:12

## 2017-03-13 RX ADMIN — HYDROMORPHONE HYDROCHLORIDE 1 MG: 2 INJECTION, SOLUTION INTRAMUSCULAR; INTRAVENOUS; SUBCUTANEOUS at 22:10

## 2017-03-13 RX ADMIN — BUPIVACAINE HYDROCHLORIDE AND EPINEPHRINE 7 ML: 2.5; 5 INJECTION, SOLUTION EPIDURAL; INFILTRATION; INTRACAUDAL; PERINEURAL at 02:08

## 2017-03-13 RX ADMIN — SODIUM CHLORIDE, SODIUM LACTATE, POTASSIUM CHLORIDE, CALCIUM CHLORIDE: 600; 310; 30; 20 INJECTION, SOLUTION INTRAVENOUS at 21:12

## 2017-03-13 RX ADMIN — BUPIVACAINE HYDROCHLORIDE AND EPINEPHRINE 3 ML: 2.5; 5 INJECTION, SOLUTION EPIDURAL; INFILTRATION; INTRACAUDAL; PERINEURAL at 02:06

## 2017-03-13 RX ADMIN — LIDOCAINE HYDROCHLORIDE AND EPINEPHRINE 5 ML: 20; 5 INJECTION, SOLUTION EPIDURAL; INFILTRATION; INTRACAUDAL; PERINEURAL at 21:18

## 2017-03-13 RX ADMIN — METOPROLOL TARTRATE 2 MG: 5 INJECTION INTRAVENOUS at 22:31

## 2017-03-13 RX ADMIN — CEFAZOLIN: 10 INJECTION, POWDER, FOR SOLUTION INTRAVENOUS; PARENTERAL at 21:30

## 2017-03-13 RX ADMIN — SODIUM CHLORIDE, SODIUM LACTATE, POTASSIUM CHLORIDE, AND CALCIUM CHLORIDE 125 ML/HR: 600; 310; 30; 20 INJECTION, SOLUTION INTRAVENOUS at 10:38

## 2017-03-13 NOTE — ANESTHESIA PREPROCEDURE EVALUATION
Anesthetic History   No history of anesthetic complications            Review of Systems / Medical History  Patient summary reviewed, nursing notes reviewed and pertinent labs reviewed    Pulmonary          Smoker  Asthma : well controlled       Neuro/Psych   Within defined limits           Cardiovascular    Hypertension: well controlled              Exercise tolerance: >4 METS     GI/Hepatic/Renal  Within defined limits              Endo/Other    Diabetes: well controlled, type 2    Morbid obesity and anemia     Other Findings   Comments:   Bleeding nose          Physical Exam    Airway  Mallampati: III  TM Distance: 4 - 6 cm  Neck ROM: normal range of motion, short neck   Mouth opening: Normal     Cardiovascular    Rhythm: regular  Rate: normal         Dental    Dentition: Poor dentition     Pulmonary  Breath sounds clear to auscultation               Abdominal  GI exam deferred       Other Findings            Anesthetic Plan    ASA: 3  Anesthesia type: epidural      Post-op pain plan if not by surgeon: indwelling epidural catheter      Anesthetic plan and risks discussed with: Patient

## 2017-03-13 NOTE — PROGRESS NOTES
Labor Progress Note  Patient seen, fetal heart rate and contraction pattern evaluated, patient examined. Patient Vitals for the past 1 hrs:   BP Pulse SpO2   03/13/17 0827 161/73 75 -   03/13/17 0826 - - 94 %       Physical Exam:  Cervical Exam:  2 cm dilated    50% effaced    -2 station    Presenting Part: cephalic  Cervical Position: mid position  Consistency: Soft  Membranes:  Artificial Rupture of Membranes;  Amniotic Fluid: medium amount of clear fluid  Uterine Activity:irregular  Fetal Heart Rate: Reactive  Baseline: 130 per minute    Assessment/Plan:  Reassuring fetal status, Continue plan for vaginal delivery

## 2017-03-13 NOTE — PROGRESS NOTES
Report given to MEI Diop RN. Called Dr. Jenny Hernandez to let him know that Dr Alan Hamm called c/s, he will arrive a little after 0800.

## 2017-03-13 NOTE — ANESTHESIA PROCEDURE NOTES
Epidural Block    Start time: 3/13/2017 1:43 AM  End time: 3/13/2017 2:09 AM  Performed by: Shiv Wynn  Authorized by: Shiv Wynn     Pre-Procedure  Indication: labor epidural    Preanesthetic Checklist: risks and benefits discussed, site marked and timeout performed    Timeout Time: 01:44        Epidural:   Patient position:  Seated  Prep region:  Lumbar  Prep: DuraPrep    Location:  L3-4    Needle and Epidural Catheter:   Needle Type:  Tuohy  Needle Gauge:  17 G  Injection Technique:  Loss of resistance using air  Attempts:  3  Catheter Size:  19 G  Depth in Epidural Space (cm):  5  Events: no blood with aspiration, no cerebrospinal fluid with aspiration, no paresthesia and negative aspiration test    Test Dose:  Negative and bupivacaine 0.25% w/ epi    Assessment:   Catheter Secured:  Tegaderm and tape  Insertion:  Complicated  Patient tolerance:  Patient tolerated the procedure well with no immediate complications  Difficult epidural placement due to body habitus.

## 2017-03-14 LAB
BASOPHILS # BLD AUTO: 0 K/UL (ref 0–0.1)
BASOPHILS # BLD: 0 % (ref 0–1)
EOSINOPHIL # BLD: 0.1 K/UL (ref 0–0.4)
EOSINOPHIL NFR BLD: 1 % (ref 0–7)
ERYTHROCYTE [DISTWIDTH] IN BLOOD BY AUTOMATED COUNT: 15.9 % (ref 11.5–14.5)
GLUCOSE BLD STRIP.AUTO-MCNC: 106 MG/DL (ref 65–100)
GLUCOSE BLD STRIP.AUTO-MCNC: 107 MG/DL (ref 65–100)
GLUCOSE BLD STRIP.AUTO-MCNC: 108 MG/DL (ref 65–100)
GLUCOSE BLD STRIP.AUTO-MCNC: 172 MG/DL (ref 65–100)
GLUCOSE BLD STRIP.AUTO-MCNC: 176 MG/DL (ref 65–100)
HCT VFR BLD AUTO: 28.8 % (ref 35–47)
HGB BLD-MCNC: 9 G/DL (ref 11.5–16)
LYMPHOCYTES # BLD AUTO: 17 % (ref 12–49)
LYMPHOCYTES # BLD: 2.3 K/UL (ref 0.8–3.5)
MCH RBC QN AUTO: 23 PG (ref 26–34)
MCHC RBC AUTO-ENTMCNC: 31.3 G/DL (ref 30–36.5)
MCV RBC AUTO: 73.5 FL (ref 80–99)
MONOCYTES # BLD: 0.8 K/UL (ref 0–1)
MONOCYTES NFR BLD AUTO: 6 % (ref 5–13)
NEUTS SEG # BLD: 10.6 K/UL (ref 1.8–8)
NEUTS SEG NFR BLD AUTO: 76 % (ref 32–75)
PLATELET # BLD AUTO: 280 K/UL (ref 150–400)
RBC # BLD AUTO: 3.92 M/UL (ref 3.8–5.2)
SERVICE CMNT-IMP: ABNORMAL
WBC # BLD AUTO: 13.8 K/UL (ref 3.6–11)

## 2017-03-14 PROCEDURE — 77030018836 HC SOL IRR NACL ICUM -A

## 2017-03-14 PROCEDURE — 74011250637 HC RX REV CODE- 250/637: Performed by: SPECIALIST

## 2017-03-14 PROCEDURE — 65410000002 HC RM PRIVATE OB

## 2017-03-14 PROCEDURE — 85025 COMPLETE CBC W/AUTO DIFF WBC: CPT | Performed by: SPECIALIST

## 2017-03-14 PROCEDURE — 74011250636 HC RX REV CODE- 250/636: Performed by: SPECIALIST

## 2017-03-14 PROCEDURE — 82962 GLUCOSE BLOOD TEST: CPT

## 2017-03-14 PROCEDURE — 36415 COLL VENOUS BLD VENIPUNCTURE: CPT | Performed by: SPECIALIST

## 2017-03-14 PROCEDURE — 74011250636 HC RX REV CODE- 250/636

## 2017-03-14 PROCEDURE — 74011250637 HC RX REV CODE- 250/637: Performed by: OBSTETRICS & GYNECOLOGY

## 2017-03-14 RX ORDER — IBUPROFEN 400 MG/1
800 TABLET ORAL EVERY 8 HOURS
Status: DISCONTINUED | OUTPATIENT
Start: 2017-03-14 | End: 2017-03-16 | Stop reason: HOSPADM

## 2017-03-14 RX ORDER — OXYCODONE AND ACETAMINOPHEN 5; 325 MG/1; MG/1
1 TABLET ORAL
Status: DISCONTINUED | OUTPATIENT
Start: 2017-03-14 | End: 2017-03-16 | Stop reason: HOSPADM

## 2017-03-14 RX ORDER — KETOROLAC TROMETHAMINE 30 MG/ML
30 INJECTION, SOLUTION INTRAMUSCULAR; INTRAVENOUS
Status: COMPLETED | OUTPATIENT
Start: 2017-03-14 | End: 2017-03-14

## 2017-03-14 RX ORDER — NALOXONE HYDROCHLORIDE 0.4 MG/ML
0.4 INJECTION, SOLUTION INTRAMUSCULAR; INTRAVENOUS; SUBCUTANEOUS AS NEEDED
Status: DISCONTINUED | OUTPATIENT
Start: 2017-03-14 | End: 2017-03-16 | Stop reason: HOSPADM

## 2017-03-14 RX ORDER — LANOLIN ALCOHOL/MO/W.PET/CERES
1 CREAM (GRAM) TOPICAL 2 TIMES DAILY WITH MEALS
Status: DISCONTINUED | OUTPATIENT
Start: 2017-03-14 | End: 2017-03-16 | Stop reason: HOSPADM

## 2017-03-14 RX ORDER — KETOROLAC TROMETHAMINE 30 MG/ML
30 INJECTION, SOLUTION INTRAMUSCULAR; INTRAVENOUS
Status: DISCONTINUED | OUTPATIENT
Start: 2017-03-14 | End: 2017-03-16 | Stop reason: HOSPADM

## 2017-03-14 RX ORDER — ZOLPIDEM TARTRATE 5 MG/1
5 TABLET ORAL
Status: DISCONTINUED | OUTPATIENT
Start: 2017-03-14 | End: 2017-03-16 | Stop reason: HOSPADM

## 2017-03-14 RX ORDER — OXYTOCIN/RINGER'S LACTATE 20/1000 ML
125-500 PLASTIC BAG, INJECTION (ML) INTRAVENOUS ONCE
Status: ACTIVE | OUTPATIENT
Start: 2017-03-14 | End: 2017-03-14

## 2017-03-14 RX ORDER — KETOROLAC TROMETHAMINE 30 MG/ML
INJECTION, SOLUTION INTRAMUSCULAR; INTRAVENOUS
Status: COMPLETED
Start: 2017-03-14 | End: 2017-03-14

## 2017-03-14 RX ORDER — HYDROCORTISONE ACETATE PRAMOXINE HCL 2.5; 1 G/100G; G/100G
CREAM TOPICAL AS NEEDED
Status: DISCONTINUED | OUTPATIENT
Start: 2017-03-14 | End: 2017-03-16 | Stop reason: HOSPADM

## 2017-03-14 RX ADMIN — LABETALOL HYDROCHLORIDE 100 MG: 200 TABLET, FILM COATED ORAL at 21:31

## 2017-03-14 RX ADMIN — KETOROLAC TROMETHAMINE 30 MG: 30 INJECTION, SOLUTION INTRAMUSCULAR; INTRAVENOUS at 00:33

## 2017-03-14 RX ADMIN — LABETALOL HYDROCHLORIDE 100 MG: 200 TABLET, FILM COATED ORAL at 08:25

## 2017-03-14 RX ADMIN — OXYCODONE HYDROCHLORIDE AND ACETAMINOPHEN 1 TABLET: 5; 325 TABLET ORAL at 14:28

## 2017-03-14 RX ADMIN — CEFAZOLIN 2 G: 10 INJECTION, POWDER, FOR SOLUTION INTRAVENOUS; PARENTERAL at 06:35

## 2017-03-14 RX ADMIN — SODIUM CHLORIDE, SODIUM LACTATE, POTASSIUM CHLORIDE, AND CALCIUM CHLORIDE 75 ML/HR: 600; 310; 30; 20 INJECTION, SOLUTION INTRAVENOUS at 12:32

## 2017-03-14 RX ADMIN — OXYCODONE HYDROCHLORIDE AND ACETAMINOPHEN 1 TABLET: 5; 325 TABLET ORAL at 22:47

## 2017-03-14 RX ADMIN — CEFAZOLIN 2 G: 10 INJECTION, POWDER, FOR SOLUTION INTRAVENOUS; PARENTERAL at 14:28

## 2017-03-14 RX ADMIN — KETOROLAC TROMETHAMINE 30 MG: 30 INJECTION, SOLUTION INTRAMUSCULAR at 07:19

## 2017-03-14 RX ADMIN — METFORMIN HYDROCHLORIDE 500 MG: 500 TABLET, FILM COATED ORAL at 17:23

## 2017-03-14 RX ADMIN — METFORMIN HYDROCHLORIDE 500 MG: 500 TABLET, FILM COATED ORAL at 08:24

## 2017-03-14 RX ADMIN — KETOROLAC TROMETHAMINE 30 MG: 30 INJECTION, SOLUTION INTRAMUSCULAR at 17:37

## 2017-03-14 RX ADMIN — Medication 325 MG: at 17:23

## 2017-03-14 RX ADMIN — Medication 325 MG: at 09:59

## 2017-03-14 RX ADMIN — KETOROLAC TROMETHAMINE 30 MG: 30 INJECTION, SOLUTION INTRAMUSCULAR at 00:33

## 2017-03-14 NOTE — ANESTHESIA POSTPROCEDURE EVALUATION
Post-Anesthesia Evaluation and Assessment    Patient: Louisa Choudhary MRN: [id number]  SSN: [id number]    YOB: 1985  Age: 33 y.o. Sex: female       Cardiovascular Function/Vital Signs  Visit Vitals    BP (!) 159/95    Pulse (!) 105    Temp 36.8 °C (98.3 °F)    Resp 20    Ht 5' 6\" (1.676 m)    Wt 120.2 kg (265 lb)    SpO2 98%    Breastfeeding No    BMI 42.77 kg/m2       Patient is status post epidural anesthesia for Procedure(s):   SECTION. Nausea/Vomiting: None    Postoperative hydration reviewed and adequate. Pain:  Pain Scale 1: Numeric (0 - 10) (17)  Pain Intensity 1: 0 (17)   Managed    Neurological Status:   Neuro (WDL): Within Defined Limits (17)   At baseline    Mental Status and Level of Consciousness: Arousable    Pulmonary Status:   O2 Device: Nasal cannula (17)   Adequate oxygenation and airway patent    Complications related to anesthesia: None    Post-anesthesia assessment completed.  No concerns    Signed By: Damien Thomas MD     2017

## 2017-03-14 NOTE — ROUTINE PROCESS
Bedside shift change report given to ARCHIE Ratliff (oncoming nurse) by URSZULA Oswald (offgoing nurse). Report included the following information SBAR, Kardex, Intake/Output, MAR and Recent Results.

## 2017-03-14 NOTE — PROGRESS NOTES
Candance Huger TRANSFER - OUT REPORT:    Verbal report given to JESI Pandya(name) on Artis Larkin  being transferred to MIU(unit) for routine progression of care       Report consisted of patients Situation, Background, Assessment and   Recommendations(SBAR). Information from the following report(s) SBAR was reviewed with the receiving nurse. Lines:   Peripheral IV 03/12/17 Left Wrist (Active)        Opportunity for questions and clarification was provided.       Patient transported with:   Registered Nurse

## 2017-03-14 NOTE — ROUTINE PROCESS
Bedside and Verbal shift change report given to BAUTISTA French (oncoming nurse) by Griselda Sheridan RN (offgoing nurse). Report included the following information SBAR.

## 2017-03-14 NOTE — PROGRESS NOTES
Post-Operative  Day 1    [facility]     Information for the patient's :  Delmar Mcgee [[id number]]   , Low Transverse   Patient doing well without significant complaint. Nausea and vomiting resolved, tolerating liquids, no flatus, jack in place. Vitals:    Visit Vitals    /89 (BP 1 Location: Right arm, BP Patient Position: At rest)    Pulse 88    Temp 98 °F (36.7 °C)    Resp 18    Ht 5' 6\" (1.676 m)    Wt 120.2 kg (265 lb)    SpO2 98%    Breastfeeding Unknown    BMI 42.77 kg/m2     Temp (24hrs), Av.5 °F (36.9 °C), Min:98 °F (36.7 °C), Max:99.3 °F (37.4 °C)         Intake and Output:   Current shift:    Last 3 completed shifts:  1901 -  0700  In: 6545.8 [I.V.:6545.8]  Out: 9317 [Urine:3375]        Exam:        Patient without distress. Lungs clear. Abdomen, bowel sounds present, soft, expected tenderness, fundus firm Wound dressing intact     Perineum normal lochia noted               Lower extremities are negative for swelling, cords or tenderness.     Labs:   Lab Results   Component Value Date/Time    WBC 13.8 2017 06:24 AM    WBC 10.7 2017 05:34 PM    WBC 11.2 2017 03:00 PM    WBC 11.0 2017 04:35 PM    WBC 13.1 2017 02:46 AM    WBC 11.6 2017 01:53 AM    WBC 10.9 2017 04:09 PM    WBC 12.2 2016 08:05 AM    HGB 9.0 2017 06:24 AM    HGB 9.9 2017 05:34 PM    HGB 9.6 2017 03:00 PM    HGB 8.8 2017 04:35 PM    HGB 8.9 2017 02:46 AM    HGB 9.3 2017 01:53 AM    HGB 9.2 2017 04:09 PM    HGB 9.2 2016 08:05 AM    HCT 28.8 2017 06:24 AM    HCT 32.7 2017 05:34 PM    HCT 31.6 2017 03:00 PM    HCT 28.4 2017 04:35 PM    HCT 28.6 2017 02:46 AM    HCT 30.0 2017 01:53 AM    HCT 29.8 2017 04:09 PM    HCT 30.0 2016 08:05 AM    PLATELET 560  06:24 AM    PLATELET 321  05:34 PM    PLATELET 634  03:00 PM    PLATELET 496 97/26/0323 04:35 PM    PLATELET 758 94/53/5712 02:46 AM    PLATELET 979 94/07/4148 01:53 AM    PLATELET 995 14/34/0867 04:09 PM    PLATELET 613 17/65/6964 08:05 AM       Recent Results (from the past 24 hour(s))   GLUCOSE, POC    Collection Time: 03/13/17  8:13 AM   Result Value Ref Range    Glucose (POC) 96 65 - 100 mg/dL    Performed by [address] Maz[address] Plummer, POC    Collection Time: 03/13/17 10:31 AM   Result Value Ref Range    Glucose (POC) 88 65 - 100 mg/dL    Performed by [address] Mazin Plummer, POC    Collection Time: 03/13/17 12:51 PM   Result Value Ref Range    Glucose (POC) 98 65 - 100 mg/dL    Performed by [address] Mazin Plummer, POC    Collection Time: 03/13/17  2:51 PM   Result Value Ref Range    Glucose (POC) 82 65 - 100 mg/dL    Performed by [address] Mazin Plummer, POC    Collection Time: 03/13/17  4:42 PM   Result Value Ref Range    Glucose (POC) 79 65 - 100 mg/dL    Performed by 2001 Mazin Plummer, POC    Collection Time: 03/13/17  6:34 PM   Result Value Ref Range    Glucose (POC) 88 65 - 100 mg/dL    Performed by [address] Mazin Plummer, POC    Collection Time: 03/13/17  8:50 PM   Result Value Ref Range    Glucose (POC) 81 65 - 100 mg/dL    Performed by Caitie Ingram    GLUCOSE, POC    Collection Time: 03/14/17 12:22 AM   Result Value Ref Range    Glucose (POC) 107 (H) 65 - 100 mg/dL    Performed by Shelba Schirmer    CBC WITH AUTOMATED DIFF    Collection Time: 03/14/17  6:24 AM   Result Value Ref Range    WBC 13.8 (H) 3.6 - 11.0 K/uL    RBC 3.92 3.80 - 5.20 M/uL    HGB 9.0 (L) 11.5 - 16.0 g/dL    HCT 28.8 (L) 35.0 - 47.0 %    MCV 73.5 (L) 80.0 - 99.0 FL    MCH 23.0 (L) 26.0 - 34.0 PG    MCHC 31.3 30.0 - 36.5 g/dL    RDW 15.9 (H) 11.5 - 14.5 %    PLATELET 243 969 - 751 K/uL    NEUTROPHILS 76 (H) 32 - 75 %    LYMPHOCYTES 17 12 - 49 %    MONOCYTES 6 5 - 13 %    EOSINOPHILS 1 0 - 7 %    BASOPHILS 0 0 - 1 %    ABS. NEUTROPHILS 10.6 (H) 1.8 - 8.0 K/UL    ABS.  LYMPHOCYTES 2.3 0.8 - 3.5 K/UL    ABS. MONOCYTES 0.8 0.0 - 1.0 K/UL    ABS. EOSINOPHILS 0.1 0.0 - 0.4 K/UL    ABS. BASOPHILS 0.0 0.0 - 0.1 K/UL       Assessment: Post-Op day 1, stable      Plan:   1.  Routine post-operative care

## 2017-03-14 NOTE — PROGRESS NOTES
2974- In pt room for fundal check and ambulate patient. DEANGELO found to be saturated. L&D nurse in to assess with primary nurse. Assessment complete. Pt [email or web address]@[facility] with scant vaginal bleeding and VSS, T98.0  P 88 R 18 /89.     0555- Dr Tereza Montilla called and asked for Dr. Keenan Hernandez, [facility] Hospitalist to assess incision. 0600- Dr. Keenan Hernandez in to see patient. DEANGELO removed and incision cleaned. New DEANGELO placed and saline bag placed on top of DEANGELO for extra pressure. Will continue to monitor incision.

## 2017-03-14 NOTE — PROGRESS NOTES
Duramorph Follow-Up Note    1 Day Post-Op sp Procedure(s):   SECTION. /81  Pulse 76  Temp 36.8 °C (98.2 °F)  Resp 18  Ht 5' 6\" (1.676 m)  Wt 120.2 kg (265 lb)  LMP 05/10/2016  SpO2 98%  Breastfeeding? Unknown  BMI 42.77 kg/m2. Patient is POD-1 S/P intrathecal duramorph. Pain is well controlled  Patient reports no headache, fever, weakness or numbness. Epidural/spinal tap site is clean, dry and intact. No obvious Anesthesia complications noted. Plan:    Pain management as per primary service.

## 2017-03-14 NOTE — OP NOTES
[address], [address]   OP NOTE       Name:  Kelly Henderson   MR#:  [id number]   :  1985   Account #:  [de-identified]    Surgery Date:  2017   Date of Adm:  2017       The patient is a term pregnancy with diabetes mellitus, hypertension   and severe obesity. PREOPERATIVE DIAGNOSES:     1. Arrest of labor, failure to progress. 2. Non-reassuring fetal heart tracing remote from delivery. POSTOPERATIVE DIAGNOS:      PROCEDURES PERFORMED:  Primary low flap  section. SURGEON: Valery Chisholm MD    ASSISTANT: Dr. Rg Scales:  1000 mL. IV FLUID: About 2000 lactate ringers. URINE OUTPUT: About 200. PATHOLOGY: Placenta. SPECIMENS REMOVED: placenta    ANESTHESIA:  Epidural.     ANESTHESIOLOGY: Dr. Littlejohn Rounds: None. PROCEDURE AS FOLLOWS: After obtaining informed consent,   explained the risks, benefits and alternatives, the patient was taken to   the OR where she was prepped and draped in the usual sterile   fashion. A Pfannenstiel skin incision was made and carried to the level   of the fascia. The fascia was nicked in the midline and extended   bilaterally using the Choi scissors. The vesicouterine incision was   entered sharply and dissected bilaterally with Metzenbaum. The uterus   was entered with a scalpel and extended bilaterally with the bandage   scissors. The baby's head was delivered atraumatically. The   oropharynx was suctioned. The cord was clamped and cut. The   placenta was manually removed. The uterus was exteriorized and   cleared of all clot and debris. uterine incision closed with 0 vicryl  in a   Locking  Fashion. ,uterus imbricated with a 0 vicryl , fascia closed with 1 vicryl ,The skin was closed with subcutaneous amena. Sponge   counts and instruments x2. The patient tolerated the procedure well and   .          Conrado Perales MD      BE / CD   D:  03/13/2017   22:18   T:  03/14/2017   07:16   Job #:  [id number]

## 2017-03-14 NOTE — PROGRESS NOTES
Spoke with Dr. Nicole Horowitz via telephone regarding patient's saturated abdominal Genevieve dressing. Orders received to change dressing to an ABD pad pressure dressing. Upon removal of old dressing, noted one small area on the right side of the wound oozing bright red blood. Pressure applied to this area for a few minutes with sterile gauze. Oozing subsided. Steri strips placed over incision to stabilize. ABD pad pressure dressing applied.

## 2017-03-15 LAB
GLUCOSE BLD STRIP.AUTO-MCNC: 110 MG/DL (ref 65–100)
GLUCOSE BLD STRIP.AUTO-MCNC: 141 MG/DL (ref 65–100)
GLUCOSE BLD STRIP.AUTO-MCNC: 160 MG/DL (ref 65–100)
GLUCOSE BLD STRIP.AUTO-MCNC: 180 MG/DL (ref 65–100)
SERVICE CMNT-IMP: ABNORMAL

## 2017-03-15 PROCEDURE — 82962 GLUCOSE BLOOD TEST: CPT

## 2017-03-15 PROCEDURE — 74011250637 HC RX REV CODE- 250/637: Performed by: OBSTETRICS & GYNECOLOGY

## 2017-03-15 PROCEDURE — 74011250637 HC RX REV CODE- 250/637: Performed by: SPECIALIST

## 2017-03-15 PROCEDURE — 65410000002 HC RM PRIVATE OB

## 2017-03-15 RX ORDER — DOCUSATE SODIUM 100 MG/1
100 CAPSULE, LIQUID FILLED ORAL
Status: DISCONTINUED | OUTPATIENT
Start: 2017-03-15 | End: 2017-03-16 | Stop reason: HOSPADM

## 2017-03-15 RX ORDER — GLYBURIDE 2.5 MG/1
1.25 TABLET ORAL
Status: DISCONTINUED | OUTPATIENT
Start: 2017-03-15 | End: 2017-03-16 | Stop reason: HOSPADM

## 2017-03-15 RX ORDER — HYDROCHLOROTHIAZIDE 25 MG/1
25 TABLET ORAL 2 TIMES DAILY
Status: DISCONTINUED | OUTPATIENT
Start: 2017-03-15 | End: 2017-03-16 | Stop reason: HOSPADM

## 2017-03-15 RX ADMIN — LINAGLIPTIN 5 MG: 5 TABLET, FILM COATED ORAL at 14:00

## 2017-03-15 RX ADMIN — DOCUSATE SODIUM 100 MG: 100 CAPSULE, LIQUID FILLED ORAL at 16:47

## 2017-03-15 RX ADMIN — Medication 325 MG: at 16:47

## 2017-03-15 RX ADMIN — LABETALOL HYDROCHLORIDE 100 MG: 200 TABLET, FILM COATED ORAL at 21:08

## 2017-03-15 RX ADMIN — OXYCODONE HYDROCHLORIDE AND ACETAMINOPHEN 1 TABLET: 5; 325 TABLET ORAL at 02:47

## 2017-03-15 RX ADMIN — Medication 325 MG: at 07:35

## 2017-03-15 RX ADMIN — METFORMIN HYDROCHLORIDE 500 MG: 500 TABLET, FILM COATED ORAL at 07:35

## 2017-03-15 RX ADMIN — IBUPROFEN 800 MG: 400 TABLET, FILM COATED ORAL at 02:47

## 2017-03-15 RX ADMIN — METFORMIN HYDROCHLORIDE 500 MG: 500 TABLET, FILM COATED ORAL at 16:48

## 2017-03-15 RX ADMIN — LABETALOL HYDROCHLORIDE 100 MG: 200 TABLET, FILM COATED ORAL at 09:05

## 2017-03-15 RX ADMIN — IBUPROFEN 800 MG: 400 TABLET, FILM COATED ORAL at 10:10

## 2017-03-15 RX ADMIN — GLYBURIDE 1.25 MG: 2.5 TABLET ORAL at 21:08

## 2017-03-15 RX ADMIN — OXYCODONE HYDROCHLORIDE AND ACETAMINOPHEN 1 TABLET: 5; 325 TABLET ORAL at 21:07

## 2017-03-15 RX ADMIN — HYDROCHLOROTHIAZIDE 25 MG: 25 TABLET ORAL at 10:10

## 2017-03-15 RX ADMIN — IBUPROFEN 800 MG: 400 TABLET, FILM COATED ORAL at 17:59

## 2017-03-15 RX ADMIN — HYDROCHLOROTHIAZIDE 25 MG: 25 TABLET ORAL at 17:59

## 2017-03-15 NOTE — PROGRESS NOTES
Bedside shift change report given to Ron RN (oncoming nurse) by TOPHER Case (offgoing nurse). Report given with SBAR.

## 2017-03-15 NOTE — PROGRESS NOTES
Post-Operative  Day 2  Tano Dobbins     Information for the patient's :  Rika Saldivar [[id number]]   , Low Transverse   Patient doing well without significant complaint. Nausea and vomiting resolved, tolerating liquids, passing flatus, voiding and ambulating without difficulty. Vitals:    Visit Vitals    /76 (BP 1 Location: Right arm, BP Patient Position: At rest)    Pulse 87    Temp 98 °F (36.7 °C)    Resp 18    Ht 5' 6\" (1.676 m)    Wt 120.2 kg (265 lb)    SpO2 98%    Breastfeeding Unknown    BMI 42.77 kg/m2     Temp (24hrs), Av °F (36.7 °C), Min:97.8 °F (36.6 °C), Max:98.2 °F (36.8 °C)        Exam:        Patient without distress. Abdomen, bowel sounds present, soft, expected tenderness, fundus firm                Wound incision clean, dry and intact               Lower extremities are negative for swelling, cords or tenderness.     Labs:   Lab Results   Component Value Date/Time    WBC 13.8 2017 06:24 AM    WBC 10.7 2017 05:34 PM    WBC 11.2 2017 03:00 PM    WBC 11.0 2017 04:35 PM    WBC 13.1 2017 02:46 AM    WBC 11.6 2017 01:53 AM    WBC 10.9 2017 04:09 PM    WBC 12.2 2016 08:05 AM    HGB 9.0 2017 06:24 AM    HGB 9.9 2017 05:34 PM    HGB 9.6 2017 03:00 PM    HGB 8.8 2017 04:35 PM    HGB 8.9 2017 02:46 AM    HGB 9.3 2017 01:53 AM    HGB 9.2 2017 04:09 PM    HGB 9.2 2016 08:05 AM    HCT 28.8 2017 06:24 AM    HCT 32.7 2017 05:34 PM    HCT 31.6 2017 03:00 PM    HCT 28.4 2017 04:35 PM    HCT 28.6 2017 02:46 AM    HCT 30.0 2017 01:53 AM    HCT 29.8 2017 04:09 PM    HCT 30.0 2016 08:05 AM    PLATELET 294  06:24 AM    PLATELET 159  05:34 PM    PLATELET 418  03:00 PM    PLATELET 155  04:35 PM    PLATELET 302  02:46 AM    PLATELET 520  01:53 AM    PLATELET 272 01/05/2017 04:09 PM    PLATELET 838 50/81/8074 08:05 AM       Recent Results (from the past 24 hour(s))   GLUCOSE, POC    Collection Time: 03/14/17  7:24 AM   Result Value Ref Range    Glucose (POC) 108 (H) 65 - 100 mg/dL    Performed by [employer], POC    Collection Time: 03/14/17 12:09 PM   Result Value Ref Range    Glucose (POC) 106 (H) 65 - 100 mg/dL    Performed by Cherie Lennox, POC    Collection Time: 03/14/17  3:47 PM   Result Value Ref Range    Glucose (POC) 172 (H) 65 - 100 mg/dL    Performed by Hyun Pulido    GLUCOSE, POC    Collection Time: 03/14/17 10:50 PM   Result Value Ref Range    Glucose (POC) 176 (H) 65 - 100 mg/dL    Performed by Tanja Franco    GLUCOSE, POC    Collection Time: 03/15/17  6:59 AM   Result Value Ref Range    Glucose (POC) 141 (H) 65 - 100 mg/dL    Performed by Tanja Franco        Assessment: Post-Op day 2, doing well      Plan:   1.  Routine post-operative care

## 2017-03-15 NOTE — PROGRESS NOTES
Called  because patient blood sugar 2 hours after breakfast was 180. No insulin orders. She wanted me to place an order for Glyburide 1.2 mg  Q HS and Januvia for now. She also wanted a sliding insulin scale to start at 180. I tried to order Januvia and couldn't so I called the pharmacy and spoke to [place]. He said that we substitue Januvia with Tradjenta 5 mg. I called Dr. Irena Garcia back and she said that was OK to use. I told her I couldn't order the insulin scale and she said to call and order a consult from Endocrinology.

## 2017-03-15 NOTE — PROGRESS NOTES
Put in a Endocrinology consult for them to come evaluate patient for medications.   Called the office and let them know the consult was in.  [phone]

## 2017-03-15 NOTE — PROGRESS NOTES
Spoke to Dr. Kya Torres to let him know patient has her own endocrinologist and is to follow up 6 weeks after infant is born; cancel consult for Dr. Jacquelin Herrera per Dr. Kya Torres. Carlin Obando

## 2017-03-15 NOTE — ROUTINE PROCESS
Bedside shift change report given to TOPHER Levy (oncoming nurse) by BAUTISTA Sanz RN (offgoing nurse). Report included the following information SBAR.

## 2017-03-15 NOTE — PROGRESS NOTES
Bedside and Verbal shift change report given to BAUTISTA Olvera (oncoming nurse) by Christelle Noonan RN (offgoing nurse). Report included the following information SBAR, Intake/Output and MAR.

## 2017-03-16 VITALS
HEIGHT: 66 IN | SYSTOLIC BLOOD PRESSURE: 135 MMHG | TEMPERATURE: 97.8 F | RESPIRATION RATE: 16 BRPM | WEIGHT: 265 LBS | DIASTOLIC BLOOD PRESSURE: 79 MMHG | OXYGEN SATURATION: 98 % | BODY MASS INDEX: 42.59 KG/M2 | HEART RATE: 86 BPM

## 2017-03-16 LAB
GLUCOSE BLD STRIP.AUTO-MCNC: 109 MG/DL (ref 65–100)
GLUCOSE BLD STRIP.AUTO-MCNC: 65 MG/DL (ref 65–100)
SERVICE CMNT-IMP: ABNORMAL
SERVICE CMNT-IMP: NORMAL

## 2017-03-16 PROCEDURE — 82962 GLUCOSE BLOOD TEST: CPT

## 2017-03-16 PROCEDURE — 74011250637 HC RX REV CODE- 250/637: Performed by: SPECIALIST

## 2017-03-16 PROCEDURE — 74011250636 HC RX REV CODE- 250/636: Performed by: SPECIALIST

## 2017-03-16 PROCEDURE — 90707 MMR VACCINE SC: CPT | Performed by: SPECIALIST

## 2017-03-16 PROCEDURE — 74011250637 HC RX REV CODE- 250/637: Performed by: OBSTETRICS & GYNECOLOGY

## 2017-03-16 RX ORDER — MEPERIDINE HYDROCHLORIDE 50 MG/1
50 TABLET ORAL
Qty: 20 TAB | Refills: 0 | Status: SHIPPED | OUTPATIENT
Start: 2017-03-16 | End: 2017-04-05 | Stop reason: ALTCHOICE

## 2017-03-16 RX ORDER — IBUPROFEN 200 MG
800 TABLET ORAL
Qty: 20 TAB | Refills: 0 | Status: SHIPPED | OUTPATIENT
Start: 2017-03-16 | End: 2017-04-05 | Stop reason: ALTCHOICE

## 2017-03-16 RX ADMIN — LABETALOL HYDROCHLORIDE 100 MG: 200 TABLET, FILM COATED ORAL at 08:51

## 2017-03-16 RX ADMIN — MEASLES, MUMPS, AND RUBELLA VIRUS VACCINE LIVE 0.5 ML: 1000; 12500; 1000 INJECTION, POWDER, LYOPHILIZED, FOR SUSPENSION SUBCUTANEOUS at 09:38

## 2017-03-16 RX ADMIN — Medication 325 MG: at 08:10

## 2017-03-16 RX ADMIN — IBUPROFEN 800 MG: 400 TABLET, FILM COATED ORAL at 01:41

## 2017-03-16 RX ADMIN — DOCUSATE SODIUM 100 MG: 100 CAPSULE, LIQUID FILLED ORAL at 08:51

## 2017-03-16 RX ADMIN — LINAGLIPTIN 5 MG: 5 TABLET, FILM COATED ORAL at 09:43

## 2017-03-16 RX ADMIN — METFORMIN HYDROCHLORIDE 500 MG: 500 TABLET, FILM COATED ORAL at 08:10

## 2017-03-16 RX ADMIN — OXYCODONE HYDROCHLORIDE AND ACETAMINOPHEN 1 TABLET: 5; 325 TABLET ORAL at 09:39

## 2017-03-16 RX ADMIN — OXYCODONE HYDROCHLORIDE AND ACETAMINOPHEN 1 TABLET: 5; 325 TABLET ORAL at 01:41

## 2017-03-16 RX ADMIN — IBUPROFEN 800 MG: 400 TABLET, FILM COATED ORAL at 09:39

## 2017-03-16 RX ADMIN — HYDROCHLOROTHIAZIDE 25 MG: 25 TABLET ORAL at 08:51

## 2017-03-16 NOTE — PROGRESS NOTES
Patient discharged home. Discharge instructions reviewed with patient. Patient verbalized understanding. Prescriptions given. Patient left via wheelchair and escorted by staff member.

## 2017-03-16 NOTE — DISCHARGE INSTRUCTIONS
Section: What to Expect at [address] [place] [address]    A  section, or , is surgery to deliver your baby through a cut, called an incision, that the doctor makes in your lower belly and uterus. You may have some pain in your lower belly and need pain medicine for 1 to 2 weeks. You can expect some vaginal bleeding for several weeks. You will probably need about 6 weeks to fully recover. It is important to take it easy while the incision is healing. Avoid heavy lifting, strenuous activities, or exercises that strain the belly muscles while you are recovering. Ask a family member or friend for help with housework, cooking, and shopping. This care sheet gives you a general idea about how long it will take for you to recover. But each person recovers at a different pace. Follow the steps below to get better as quickly as possible. How can you care for yourself at home? Activity  · Rest when you feel tired. Getting enough sleep will help you recover. · Try to walk each day. Start by walking a little more than you did the day before. Bit by bit, increase the amount you walk. Walking boosts blood flow and helps prevent pneumonia, constipation, and blood clots. · Avoid strenuous activities, such as bicycle riding, jogging, weightlifting, and aerobic exercise, for 6 weeks or until your doctor says it is okay. · Until your doctor says it is okay, do not lift anything heavier than your baby. · Do not do sit-ups or other exercises that strain the belly muscles for 6 weeks or until your doctor says it is okay. · Hold a pillow over your incision when you cough or take deep breaths. This will support your belly and decrease your pain. · You may shower as usual. Pat the incision dry when you are done. · You will have some vaginal bleeding. Wear sanitary pads. Do not douche or use tampons until your doctor says it is okay. · Ask your doctor when you can drive again.   · You will probably need to take at least 6 weeks off work. It depends on the type of work you do and how you feel. · Ask your doctor when it is okay for you to have sex. Diet  · You can eat your normal diet. If your stomach is upset, try bland, low-fat foods like plain rice, broiled chicken, toast, and yogurt. · Drink plenty of fluids (unless your doctor tells you not to). · You may notice that your bowel movements are not regular right after your surgery. This is common. Try to avoid constipation and straining with bowel movements. You may want to take a fiber supplement every day. If you have not had a bowel movement after a couple of days, ask your doctor about taking a mild laxative. · If you are breastfeeding, do not drink any alcohol. Medicines  · Your doctor will tell you if and when you can restart your medicines. He or she will also give you instructions about taking any new medicines. · If you take blood thinners, such as warfarin (Coumadin), clopidogrel (Plavix), or aspirin, be sure to talk to your doctor. He or she will tell you if and when to start taking those medicines again. Make sure that you understand exactly what your doctor wants you to do. · Take pain medicines exactly as directed. ¨ If the doctor gave you a prescription medicine for pain, take it as prescribed. ¨ If you are not taking a prescription pain medicine, ask your doctor if you can take an over-the-counter medicine. · If you think your pain medicine is making you sick to your stomach:  ¨ Take your medicine after meals (unless your doctor has told you not to). ¨ Ask your doctor for a different pain medicine. · If your doctor prescribed antibiotics, take them as directed. Do not stop taking them just because you feel better. You need to take the full course of antibiotics. Incision care  · If you have strips of tape on the incision, leave the tape on for a week or until it falls off. · Wash the area daily with warm, soapy water, and pat it dry. Don't use hydrogen peroxide or alcohol, which can slow healing. You may cover the area with a gauze bandage if it weeps or rubs against clothing. Change the bandage every day. · Keep the area clean and dry. Other instructions  · If you breastfeed your baby, you may be more comfortable while you are healing if you place the baby so that he or she is not resting on your belly. Try tucking your baby under your arm, with his or her body along the side you will be feeding on. Support your baby's upper body with your arm. With that hand you can control your baby's head to bring his or her mouth to your breast. This is sometimes called the football hold. Follow-up care is a key part of your treatment and safety. Be sure to make and go to all appointments, and call your doctor if you are having problems. It's also a good idea to know your test results and keep a list of the medicines you take. When should you call for help? Call 911 anytime you think you may need emergency care. For example, call if:  · You passed out (lost consciousness). · You have symptoms of a blood clot in your lung (called a pulmonary embolism). These may include:  ¨ Sudden chest pain. ¨ Trouble breathing. ¨ Coughing up blood. · You have thoughts of harming yourself, your baby, or another person. Call your doctor now or seek immediate medical care if:  · You have severe vaginal bleeding. This means that you are soaking through a pad every hour for 2 or more hours. · You are dizzy or lightheaded, or you feel like you may faint. · You have new or more belly pain. · You have loose stitches, or your incision comes open. · You have symptoms of infection, such as:  ¨ Increased pain, swelling, warmth, or redness. ¨ Red streaks leading from the incision. ¨ Pus draining from the incision. ¨ A fever.   · You have symptoms of a blood clot in your leg (called a deep vein thrombosis), such as:  ¨ Pain in your calf, back of the knee, thigh, or groin.  ¨ Redness and swelling in your leg or groin. Watch closely for changes in your health, and be sure to contact your doctor if:  · You feel sad, anxious, or hopeless for more than a few days. · You do not get better as expected. Where can you learn more? Go to http://sheree-frank.info/. Enter M806 in the search box to learn more about \" Section: What to Expect at Home. \"  Current as of: May 30, 2016  Content Version: 11.1  © 3594-8342 [employer]wise, Incorporated. Care instructions adapted under license by [employer] (which disclaims liability or warranty for this information). If you have questions about a medical condition or this instruction, always ask your healthcare professional. [address] any warranty or liability for your use of this information.

## 2017-03-16 NOTE — ROUTINE PROCESS
Bedside shift change report given to Jesus Romeo RN (oncoming nurse) by BAUTISTA Benavides RN (offgoing nurse). Report included the following information SBAR.

## 2017-03-16 NOTE — PROGRESS NOTES
Spiritual Care Assessment/Progress Notes    Iliana Horn [id number]  [id number]    1985  33 y.o.  female    Patient Telephone Number: [phone] (home)   Mormon Affiliation: Emmanuel Lombardo   Language: English   Extended Emergency Contact Information  Primary Emergency Contact: Socoter Warner  Address: [address], [address] [place] [address] Way Phone: [phone]  Relation: Boyfriend   Patient Active Problem List    Diagnosis Date Noted    Pregnancy 03/12/2017    Decreased fetal movement during pregnancy in third trimester, antepartum 02/05/2017     Class: Present on Admission    Back pain in pregnancy 01/30/2017     Class: Present on Admission    Diarrhea, unspecified 01/30/2017     Class: Present on Admission    Bleeding nose 01/07/2017    [facility]glycemia due to type 2 diabetes mellitus ([facility] 03/23/2016    Hypertension 03/23/2016    Asthma 03/23/2016        Date: 3/16/2017       Level of Mormon/Spiritual Activity:  []         Involved in nickolas tradition/spiritual practice    []         Not involved in nickolas tradition/spiritual practice  [x]         Spiritually oriented    []         Claims no spiritual orientation    []         seeking spiritual identity  []         Feels alienated from Mandaen practice/tradition  []         Feels angry about Mandaen practice/tradition  [x]         Spirituality/Mandaen tradition is a resource for coping at this time.   []         Not able to assess due to medical condition    Services Provided Today:  []         crisis intervention    []         reading Scriptures  [x]         spiritual assessment    [x]         prayer  [x]         empathic listening/emotional support  []         rites and rituals (cite in comments)  []         life review     []         Mandaen support  []         theological development    []         advocacy  []         ethical dialog     []         blessing  []         bereavement support    [] support to family  []         anticipatory grief support   []         help with AMD  []         spiritual guidance    []         meditation      Spiritual Care Needs  []         Emotional Support  []         Spiritual/Bahai Care  []         Loss/Adjustment  []         Advocacy/Referral                /Ethics  [x]         No needs expressed at               this time  []         Other: (note in               comments)  [address]  []         Follow up visits with               pt/family  []         Provide materials  []         Schedule sacraments  []         Contact Community               Clergy  [x]         Follow up as needed  []         Other: (note in               comments)     Comments:  Initial visit on Mother/Infant for spiritual assessment. No visitors present. Patient shared her afsaneh of welcoming her first baby. She has good support from family as well as good spiritual support. Offered congratulations and assurance of prayer.   KRISTAN Hayes, [facility], [address] [facility] [address]     Paging Service  [phone])

## 2017-03-16 NOTE — PROGRESS NOTES
Post-Operative  Day 3    Gilda Fragmin     Information for the patient's :  Everardo Durbin [[id number]]   , Low Transverse   Patient doing well without significant complaint. Tolerating diet, passing flatus, voiding and ambulating without difficulty    Vitals:    Visit Vitals    /76 (BP 1 Location: Right arm, BP Patient Position: At rest)    Pulse (!) 103    Temp 97.9 °F (36.6 °C)    Resp 18    Ht 5' 6\" (1.676 m)    Wt 120.2 kg (265 lb)    SpO2 98%    Breastfeeding Unknown    BMI 42.77 kg/m2     Temp (24hrs), Av.9 °F (36.6 °C), Min:97.9 °F (36.6 °C), Max:98 °F (36.7 °C)        Exam:        Patient without distress. Abdomen, bowel sounds present, soft, expected tenderness, fundus firm                Wound incision clean, dry and intact               Lower extremities are negative for swelling, cords or tenderness.     Labs:   Lab Results   Component Value Date/Time    WBC 13.8 2017 06:24 AM    WBC 10.7 2017 05:34 PM    WBC 11.2 2017 03:00 PM    WBC 11.0 2017 04:35 PM    WBC 13.1 2017 02:46 AM    WBC 11.6 2017 01:53 AM    WBC 10.9 2017 04:09 PM    WBC 12.2 2016 08:05 AM    HGB 9.0 2017 06:24 AM    HGB 9.9 2017 05:34 PM    HGB 9.6 2017 03:00 PM    HGB 8.8 2017 04:35 PM    HGB 8.9 2017 02:46 AM    HGB 9.3 2017 01:53 AM    HGB 9.2 2017 04:09 PM    HGB 9.2 2016 08:05 AM    HCT 28.8 2017 06:24 AM    HCT 32.7 2017 05:34 PM    HCT 31.6 2017 03:00 PM    HCT 28.4 2017 04:35 PM    HCT 28.6 2017 02:46 AM    HCT 30.0 2017 01:53 AM    HCT 29.8 2017 04:09 PM    HCT 30.0 2016 08:05 AM    PLATELET 074 36/15/9365 06:24 AM    PLATELET 866  05:34 PM    PLATELET 935  03:00 PM    PLATELET 333  04:35 PM    PLATELET 820 15/95/5477 02:46 AM    PLATELET 117  01:53 AM    PLATELET 604  04:09 PM PLATELET 253 73/11/4369 08:05 AM       Recent Results (from the past 24 hour(s))   GLUCOSE, POC    Collection Time: 03/15/17 11:02 AM   Result Value Ref Range    Glucose (POC) 180 (H) 65 - 100 mg/dL    Performed by Warden Ruiz, POC    Collection Time: 03/15/17  4:37 PM   Result Value Ref Range    Glucose (POC) 110 (H) 65 - 100 mg/dL    Performed by [employer], POC    Collection Time: 03/15/17  8:02 PM   Result Value Ref Range    Glucose (POC) 160 (H) 65 - 100 mg/dL    Performed by Hieu Luna    GLUCOSE, POC    Collection Time: 03/16/17  6:46 AM   Result Value Ref Range    Glucose (POC) 65 65 - 100 mg/dL    Performed by Theresa Randolph        Assessment: Post-Op day 3, doing well      Plan:   1. Discharge home today     3. Follow up in office in 6 weeks with Noah Drake MD  3. Post partum activity advised, diet as tolerated  4.  Discharge Medications: ibuprofen, percocet and medications prior to admission

## 2017-03-16 NOTE — LACTATION NOTE
This note was copied from a baby's chart.     Couplet Interdisciplinary Rounds     MATERNAL    Daily Goal:     Influenza screening completed: YES   Tdap screening completed: YES   Rhogam Given:N/A  MMR Given:YES    VTE Prophylaxis: Not indicated, per Provider order    EPDS:            Patient Name: ROBLES Bangura Diagnosis:   Liveborn by    Date of Admission: 3/13/2017 LOS: 3  Gestational Age: Gestational Age: 38w1d       Daily Goal:     Birth Weight: 4.18 kg Current Weight: Weight: 4.13 kg (9lb 1.7oz)  % of Weight Change: -1%    Feeding:   Metabolic Screen: YES and Initial    Hepatitis B:  YES and On MAR    Discharge Bili:  YES  Car Seat Trial, if needed:  N/A      Patient/Family Teaching Needs:     Days before discharge: Ready for discharge    In Attendance:  Nursing and Physician

## 2017-03-29 NOTE — ROUTINE PROCESS
In chart as CCL to review documentation related to IV administration, immunization screening, care plan, education and discharge caregiver and routine chart audit.

## 2017-03-30 LAB — CREATININE, EXTERNAL: 0.67

## 2017-04-05 ENCOUNTER — OFFICE VISIT (OUTPATIENT)
Dept: INTERNAL MEDICINE CLINIC | Age: 32
End: 2017-04-05

## 2017-04-05 VITALS
DIASTOLIC BLOOD PRESSURE: 88 MMHG | OXYGEN SATURATION: 100 % | HEART RATE: 95 BPM | BODY MASS INDEX: 35.36 KG/M2 | RESPIRATION RATE: 18 BRPM | WEIGHT: 220 LBS | HEIGHT: 66 IN | SYSTOLIC BLOOD PRESSURE: 121 MMHG | TEMPERATURE: 97.1 F

## 2017-04-05 DIAGNOSIS — H92.03 OTALGIA OF BOTH EARS: Primary | ICD-10-CM

## 2017-04-05 DIAGNOSIS — I10 ESSENTIAL HYPERTENSION: ICD-10-CM

## 2017-04-05 DIAGNOSIS — D64.9 ANEMIA, UNSPECIFIED TYPE: ICD-10-CM

## 2017-04-05 DIAGNOSIS — J30.1 NON-SEASONAL ALLERGIC RHINITIS DUE TO POLLEN: ICD-10-CM

## 2017-04-05 DIAGNOSIS — E11.65 TYPE 2 DIABETES MELLITUS WITH HYPERGLYCEMIA, WITHOUT LONG-TERM CURRENT USE OF INSULIN (HCC): ICD-10-CM

## 2017-04-05 RX ORDER — NEOMYCIN SULFATE, POLYMYXIN B SULFATE AND HYDROCORTISONE 10; 3.5; 1 MG/ML; MG/ML; [USP'U]/ML
3 SUSPENSION/ DROPS AURICULAR (OTIC) 3 TIMES DAILY
Qty: 10 ML | Refills: 0 | Status: SHIPPED | OUTPATIENT
Start: 2017-04-05 | End: 2017-04-12

## 2017-04-05 RX ORDER — FERROUS SULFATE 325(65) MG
325 TABLET, DELAYED RELEASE (ENTERIC COATED) ORAL 2 TIMES DAILY
Qty: 60 TAB | Refills: 5 | Status: SHIPPED | OUTPATIENT
Start: 2017-04-05 | End: 2018-03-05 | Stop reason: SDDI

## 2017-04-05 RX ORDER — CODEINE PHOSPHATE AND GUAIFENESIN 10; 100 MG/5ML; MG/5ML
SOLUTION ORAL
COMMUNITY
Start: 2017-01-11 | End: 2017-04-05 | Stop reason: ALTCHOICE

## 2017-04-05 RX ORDER — IBUPROFEN 600 MG/1
TABLET ORAL
COMMUNITY
Start: 2017-03-29 | End: 2017-04-05 | Stop reason: ALTCHOICE

## 2017-04-05 RX ORDER — HYDROCHLOROTHIAZIDE 25 MG/1
TABLET ORAL
COMMUNITY
Start: 2017-03-29 | End: 2018-03-05

## 2017-04-05 RX ORDER — OXYCODONE AND ACETAMINOPHEN 5; 325 MG/1; MG/1
TABLET ORAL
COMMUNITY
Start: 2017-03-22 | End: 2017-04-05 | Stop reason: ALTCHOICE

## 2017-04-05 RX ORDER — ASCORBIC ACID 250 MG
TABLET ORAL
COMMUNITY
Start: 2017-03-30 | End: 2018-03-05 | Stop reason: ALTCHOICE

## 2017-04-05 RX ORDER — LANOLIN ALCOHOL/MO/W.PET/CERES
CREAM (GRAM) TOPICAL
COMMUNITY
Start: 2017-03-30 | End: 2017-04-05 | Stop reason: ALTCHOICE

## 2017-04-05 RX ORDER — METFORMIN HYDROCHLORIDE 500 MG/1
500 TABLET ORAL 2 TIMES DAILY
Qty: 60 TAB | Refills: 11 | Status: SHIPPED | OUTPATIENT
Start: 2017-04-05 | End: 2018-03-05 | Stop reason: SDDI

## 2017-04-05 RX ORDER — LABETALOL 200 MG/1
TABLET, FILM COATED ORAL
COMMUNITY
Start: 2017-03-22 | End: 2017-04-05 | Stop reason: SDUPTHER

## 2017-04-05 RX ORDER — NAPROXEN 500 MG/1
500 TABLET ORAL 2 TIMES DAILY WITH MEALS
Qty: 60 TAB | Refills: 1 | Status: SHIPPED | OUTPATIENT
Start: 2017-04-05 | End: 2017-05-18 | Stop reason: SDUPTHER

## 2017-04-05 NOTE — PROGRESS NOTES
HISTORY OF PRESENT ILLNESS  Kenia Roman is a 33 y.o. female. Hypertension    The history is provided by the patient and spouse. This is a chronic problem. The problem has not changed since onset. Pertinent negatives include no chest pain and no shortness of breath. Ear Pain   The history is provided by the patient. This is a new problem. Episode onset: 1.5 weeks. The problem occurs hourly. Pertinent negatives include no chest pain and no shortness of breath. The symptoms are aggravated by swallowing. She has tried nothing for the symptoms. Past Medical History:   Diagnosis Date    Asthma     Diabetes ([address]     Hypertension      Social History     Social History    Marital status: SINGLE     Spouse name: N/A    Number of children: 0    Years of education: N/A     Occupational History    disabled      Social History Main Topics    Smoking status: Current Every Day Smoker     Packs/day: 0.10     Types: Cigarettes     Start date: 3/23/2004     Last attempt to quit: 1/1/2017    Smokeless tobacco: Never Used    Alcohol use No    Drug use: No    Sexual activity: Yes     Partners: Male     Other Topics Concern    Not on file     Social History Narrative    Lives with fiance 1 child       Review of Systems   Constitutional: Negative for fever. HENT: Positive for congestion and ear pain. Negative for ear discharge. Respiratory: Negative for cough and shortness of breath. Cardiovascular: Negative for chest pain. Physical Exam  Visit Vitals    /88 (BP 1 Location: Right arm, BP Patient Position: Sitting)    Pulse 95    Temp 97.1 °F (36.2 °C) (Oral)    Resp 18    Ht 5' 6\" (1.676 m)    Wt 220 lb (99.8 kg)    LMP 05/10/2016    SpO2 100%    BMI 35.51 kg/m2       WDWN NAD  TM clear, throat wnl  Neck no adenopathy  Heart RRR no C/M/R  Lungs CTA  Abdo soft non tender  Ext No redness swelling or edema    ASSESSMENT and PLAN  Encounter Diagnoses   Name Primary?     Otalgia of both ears Yes  Essential hypertension     Anemia, unspecified type     Type 2 diabetes mellitus with hyperglycemia, without long-term current use of insulin (HCC)     Non-seasonal allergic rhinitis due to pollen      Orders Placed This Encounter    DISCONTD: ferrous sulfate 325 mg (65 mg iron) tablet    DISCONTD: guaiFENesin-codeine (ROBITUSSIN AC) 100-10 mg/5 mL solution    VITAMIN C 250 mg tablet    hydroCHLOROthiazide (HYDRODIURIL) 25 mg tablet    DISCONTD: ibuprofen (MOTRIN) 600 mg tablet    DISCONTD: labetalol (NORMODYNE) 200 mg tablet    DISCONTD: oxyCODONE-acetaminophen (PERCOCET) 5-325 mg per tablet    metFORMIN (GLUCOPHAGE) 500 mg tablet    naproxen (NAPROSYN) 500 mg tablet    neomycin-polymyxin-hydrocortisone, buffered, (PEDIOTIC) 3.5-10,000-1 mg/mL-unit/mL-% otic suspension    ferrous sulfate (IRON) 325 mg (65 mg iron) EC tablet     Otitis externa versus TMJ. Treatment as above. Continue iron for her anemia. Hypertension stable. Follow-up Disposition:  Return in about 3 months (around 7/5/2017) for routine follow up.

## 2017-04-05 NOTE — PROGRESS NOTES
Chief Complaint   Patient presents with    Hypertension     FOLLOW UP    Ear Pain     x 2 WEEKS; JAWS LOCKS UP WHILE EATING; DRAINAGE WITH ITCHING     1. Have you been to the ER, urgent care clinic since your last visit? Hospitalized since your last visit? No    2. Have you seen or consulted any other health care providers outside of the [address] [place] [address] since your last visit? Include any pap smears or colon screening. No     Health Maintenance Due   Topic Date Due    Pneumococcal 19-64 Medium Risk (1 of 1 - PPSV23) 10/14/2002    LIPID PANEL Q1  04/10/2016    PAP AKA CERVICAL CYTOLOGY  05/06/2017     Do you have an [address] in place in the event that you have a healthcare crisis that could impact your decision making as it pertains to your health? NO    Would you like information about Advance Care Planning? NO    Information given.  NO

## 2017-04-05 NOTE — MR AVS SNAPSHOT
Visit Information Date & Time Provider Department Dept. Phone Encounter #  
 4/5/2017 10:45 AM Ab Becker  [facility]  [id number] Follow-up Instructions Return in about 3 months (around 7/5/2017) for routine follow up. Upcoming Health Maintenance Date Due Pneumococcal 19-64 Medium Risk (1 of 1 - PPSV23) 10/14/2002 LIPID PANEL Q1 4/10/2016 PAP AKA CERVICAL CYTOLOGY 5/6/2017 MICROALBUMIN Q1 6/21/2017 EYE EXAM RETINAL OR DILATED Q1 7/18/2017 HEMOGLOBIN A1C Q6M 8/2/2017 FOOT EXAM Q1 11/11/2017 DTaP/Tdap/Td series (2 - Td) 7/6/2026 Allergies as of 4/5/2017  Review Complete On: 4/5/2017 By: Ab Becker MD  
  
 Severity Noted Reaction Type Reactions Mirapex [Pramipexole]  05/16/2016    Drowsiness Morphine  03/23/2016    Shortness of Breath, Rash Tramadol  05/10/2016    Other (comments)  
 headache Current Immunizations  Reviewed on 2/19/2017 Name Date Influenza Vaccine 10/1/2016 MMR 3/16/2017  9:38 AM  
 Tdap 7/6/2016 Not reviewed this visit You Were Diagnosed With   
  
 Codes Comments Otalgia of both ears    -  Primary ICD-10-CM: H92.03 
ICD-9-CM: 388.70 Essential hypertension     ICD-10-CM: I10 
ICD-9-CM: 401.9 Anemia, unspecified type     ICD-10-CM: D64.9 ICD-9-CM: 680. 9 Type 2 diabetes mellitus with hyperglycemia, without long-term current use of insulin (HCC)     ICD-10-CM: E11.65 ICD-9-CM: 250.00, 790.29 Non-seasonal allergic rhinitis due to pollen     ICD-10-CM: J30.1 ICD-9-CM: 477.0 Vitals BP Pulse Temp Resp Height(growth percentile) Weight(growth percentile) 121/88 (BP 1 Location: Right arm, BP Patient Position: Sitting) 95 97.1 °F (36.2 °C) (Oral) 18 5' 6\" (1.676 m) 220 lb (99.8 kg) LMP SpO2 BMI OB Status Smoking Status 05/10/2016 100% 35.51 kg/m2 Recent pregnancy Current Every Day Smoker Vitals History BMI and BSA Data Body Mass Index Body Surface Area 35.51 kg/m 2 2.16 m 2 Preferred Pharmacy Pharmacy Name Phone [facility], [address] [phone] Your Updated Medication List  
  
   
This list is accurate as of: 4/5/17 11:41 AM.  Always use your most recent med list.  
  
  
  
  
 * albuterol 90 mcg/actuation inhaler Commonly known as:  PROAIR HFA Take 2 Puffs by inhalation every four (4) hours as needed for Wheezing. * albuterol 2.5 mg /3 mL (0.083 %) nebulizer solution Commonly known as:  PROVENTIL VENTOLIN  
3 mL by Nebulization route every four (4) hours as needed for Wheezing. aspirin 81 mg chewable tablet Take 1 Tab by mouth daily. Blood-Glucose Meter monitoring kit Use up to three times per days  
  
 ferrous sulfate 325 mg (65 mg iron) EC tablet Commonly known as:  IRON Take 1 Tab by mouth two (2) times a day. glucose blood VI test strips strip Commonly known as:  ASCENSIA AUTODISC VI, ONE TOUCH ULTRA TEST VI  
Use up to four times daily  
  
 glyBURIDE 1.25 mg tablet Commonly known as:  Hannah Fanning Take 1 pill if you have an evening snack  
  
 hydroCHLOROthiazide 25 mg tablet Commonly known as:  HYDRODIURIL  
  
 labetalol 100 mg tablet Commonly known as:  Deetta Pu Take 100 mg by mouth two (2) times a day. Lancets Misc Use up to three times per days;  
  
 metFORMIN 500 mg tablet Commonly known as:  GLUCOPHAGE Take 1 Tab by mouth two (2) times a day. naproxen 500 mg tablet Commonly known as:  NAPROSYN Take 1 Tab by mouth two (2) times daily (with meals). For ear pain as needed  
  
 neomycin-polymyxin-hydrocortisone (buffered) 3.5-10,000-1 mg/mL-unit/mL-% otic suspension Commonly known as:  Ailyn Lindsey Administer 3 Drops into each ear three (3) times daily for 7 days. PEN NEEDLE 31 gauge x 5/16\" Ndle Generic drug:  Insulin Needles (Disposable) prenatal vitamin 27 mg iron- 800 mcg Tab tablet  
daily. SITagliptin 100 mg tablet Commonly known as:  Pierce Roman Take 1 Tab by mouth daily. VITAMIN C 250 mg tablet Generic drug:  ascorbic acid (vitamin C) * Notice: This list has 2 medication(s) that are the same as other medications prescribed for you. Read the directions carefully, and ask your doctor or other care provider to review them with you. Prescriptions Sent to Pharmacy Refills  
 metFORMIN (GLUCOPHAGE) 500 mg tablet 11 Sig: Take 1 Tab by mouth two (2) times a day. Class: Normal  
 Pharmacy: [address] [place] [address] Ph #: [phone] Route: Oral  
 naproxen (NAPROSYN) 500 mg tablet 1 Sig: Take 1 Tab by mouth two (2) times daily (with meals). For ear pain as needed Class: Normal  
 Pharmacy: [address] [place] [address] Ph #: [phone] Route: Oral  
 neomycin-polymyxin-hydrocortisone, buffered, (PEDIOTIC) 3.5-10,000-1 mg/mL-unit/mL-% otic suspension 0 Sig: Administer 3 Drops into each ear three (3) times daily for 7 days. Class: Normal  
 Pharmacy: [address] [place] [address] Ph #: [phone] Route: Both Ears  
 ferrous sulfate (IRON) 325 mg (65 mg iron) EC tablet 5 Sig: Take 1 Tab by mouth two (2) times a day. Class: Normal  
 Pharmacy: [address] [place] [address] Ph #: [phone] Route: Oral  
  
Follow-up Instructions Return in about 3 months (around 7/5/2017) for routine follow up. Patient Instructions For your allergies use an over the counter preparation like Allegra or Zyrtec which is less sedating then Benedryl. In addition Flonase or Nasacort which are steroid nasal sprays are also available OTC. Call me if the symptoms continue or worsen. Introducing [facility]! [employer] introduces DripDrop patient portal. Now you can access parts of your medical record, email your doctor's office, and request medication refills online. 1. In your internet browser, go to [email or web address] 2. Click on the First Time User? Click Here link in the Sign In box. You will see the New Member Sign Up page. 3. Enter your DripDrop Access Code exactly as it appears below. You will not need to use this code after youve completed the sign-up process. If you do not sign up before the expiration date, you must request a new code. · DripDrop Access Code: [email or web address] Expires: 5/15/2017  5:02 PM 
 
4. Enter the last four digits of your Social Security Number (xxxx) and Date of Birth (mm/dd/yyyy) as indicated and click Submit. You will be taken to the next sign-up page. 5. Create a DripDrop ID. This will be your DripDrop login ID and cannot be changed, so think of one that is secure and easy to remember. 6. Create a DripDrop password. You can change your password at any time. 7. Enter your Password Reset Question and Answer. This can be used at a later time if you forget your password. 8. Enter your e-mail address. You will receive e-mail notification when new information is available in [address] 9. Click Sign Up. You can now view and download portions of your medical record. 10. Click the Download Summary menu link to download a portable copy of your medical information. If you have questions, please visit the Frequently Asked Questions section of the DripDrop website. Remember, DripDrop is NOT to be used for urgent needs. For medical emergencies, dial 911. Now available from your iPhone and Android! Please provide this summary of care documentation to your next provider. Your primary care clinician is listed as Konrad Coelho. If you have any questions after today's visit, please call [phone].

## 2017-05-18 RX ORDER — NAPROXEN 500 MG/1
500 TABLET ORAL 2 TIMES DAILY WITH MEALS
Qty: 60 TAB | Refills: 1 | Status: SHIPPED | OUTPATIENT
Start: 2017-05-18 | End: 2018-03-05 | Stop reason: ALTCHOICE

## 2017-05-18 NOTE — TELEPHONE ENCOUNTER
Requested Prescriptions     Pending Prescriptions Disp Refills    naproxen (NAPROSYN) 500 mg tablet 60 Tab 1     Sig: Take 1 Tab by mouth two (2) times daily (with meals).  For ear pain as needed

## 2017-07-07 NOTE — PROGRESS NOTES
Assumed care of  EGA 3 1/7 weeks admitted for induction for HTN and type 2 diabetes. Pt had cervidil last night and SROM around 0020 and had cervidil removed. Pt is GBS+ and is allergic to mirapex, morphine and tramadol. Chiqui Yun about coming to see pt. He states that he is on his way. 46 Dr Vivian Yun at bedside. SVE performed. IUPC and FSE placed. Orders received to start pitocin. 0910 IUPC flushed with 10cc of NS since filled with blood. 1040 Pt states that she is feeling cramping in her lower abdominal area. Repositioned pt in a semi fowlers with a tilt to the lt. Will give that some time to see if gravity will help the epidural work to decrease discomfort in that area. 1051 will call anesthesia to come give a rebolus. [facility] Dr Catina Rudd notified that rebolus needed. Orders received to increase pts bolus to 16.    1105 Dr Naresh Montaño at bedside rebolusing now. 1113  Dr Vivian Yun just called to check on pt. Informed him that pt just got a rebolus. Also informed his that there still isnt much variation in the babies heart rate and that her pitocin was on 6mu. No new orders at this time. 1155 changed pts position to rt side FSE stopped working. Will change out pad and check on FSE itself. Then changed out box itself and started working again. EXTernal FHT's placed  150's. 1213 IVF increased and O2 at 10LPM applied. Pitocin decreased to 4mu. 1219 pitocin stopped. 1229 SVE performed, no significant change noted. Going to call Dr Vivian Yun to update. 1240 Dr Vivian Yun at bedside. SVE 4cm. Restart pitocin in 30 minutes. Orders received to place pt in trendelenburg. 1255 pericare done and pads changed. 1450 SVE 5-6cm. Dr Vivian Yun aware. Bloody show noted. pericare done and pt turned to lt side. 1525 pericare done and placed peanut ball between pts legs. Pt starting to feel some rectal pressure. 1 Dr. Andres Head at bedside performing SVE and reviewed strip.  He said that pt is actually 5cm. Orders received to continue as planned, get pt rebolused and comfortable. [address] Dr Maura Concepcion called to come and rebolus. 46 recalled Dr Maura Concepcion to come    [phone] dr Maura Concepcion at bedside rebolusing pt.    [phone] talked with Dr Trish Jenkins. Informed him that pt had a late deceleration, decreased pitocin to 6mu, IVF bolus and O2 at 10LPM. Informed him that no more decels noted at this time but I do have a dysfunctional ctx pattern, and the FHT\"S tracing isnt reactive by criteria and was wondering if we could give pt a rest from the pitocin for 1 hour and then restart it back up. Dr Michel sanchez to proceed with this POC. 1751 Pitocin turned off and pt informed of POC that was discussed with Dr Trish Jenkins. Pt comfortable after rebolus. 1840  pericare done and pt repostitioned onto rt side. Pt resting comfortably with family at bedside for support. 1850  Pitocin restarted at 1mu. 36.5

## 2017-07-27 RX ORDER — GLYBURIDE 1.25 MG/1
TABLET ORAL
Qty: 30 TAB | Refills: 0 | Status: SHIPPED | OUTPATIENT
Start: 2017-07-27 | End: 2018-03-05 | Stop reason: SDDI

## 2017-07-27 NOTE — TELEPHONE ENCOUNTER
Requested Prescriptions     Pending Prescriptions Disp Refills    aspirin 81 mg chewable tablet 30 Tab 5     Sig: Take 1 Tab by mouth daily. Vi Santos

## 2017-07-28 RX ORDER — GUAIFENESIN 100 MG/5ML
81 LIQUID (ML) ORAL DAILY
Qty: 90 TAB | Refills: 3 | Status: SHIPPED | OUTPATIENT
Start: 2017-07-28 | End: 2018-03-05 | Stop reason: SDDI

## 2017-07-28 NOTE — TELEPHONE ENCOUNTER
Last office visit:  4/5/17  Last filled:  2/6/17 #30 X 5 refills  No changes  No follow up scheduled

## 2018-03-05 ENCOUNTER — OFFICE VISIT (OUTPATIENT)
Dept: INTERNAL MEDICINE CLINIC | Age: 33
End: 2018-03-05

## 2018-03-05 VITALS
OXYGEN SATURATION: 97 % | DIASTOLIC BLOOD PRESSURE: 80 MMHG | WEIGHT: 250 LBS | BODY MASS INDEX: 40.18 KG/M2 | TEMPERATURE: 98.2 F | SYSTOLIC BLOOD PRESSURE: 133 MMHG | HEART RATE: 97 BPM | HEIGHT: 66 IN | RESPIRATION RATE: 18 BRPM

## 2018-03-05 DIAGNOSIS — Z3A.01 LESS THAN 8 WEEKS GESTATION OF PREGNANCY: Primary | ICD-10-CM

## 2018-03-05 DIAGNOSIS — J45.20 MILD INTERMITTENT ASTHMA WITHOUT COMPLICATION: ICD-10-CM

## 2018-03-05 DIAGNOSIS — E11.65 TYPE 2 DIABETES MELLITUS WITH HYPERGLYCEMIA, WITHOUT LONG-TERM CURRENT USE OF INSULIN (HCC): ICD-10-CM

## 2018-03-05 DIAGNOSIS — N91.5 OLIGOMENORRHEA, UNSPECIFIED TYPE: ICD-10-CM

## 2018-03-05 PROBLEM — Z34.90 PREGNANCY: Status: RESOLVED | Noted: 2017-03-12 | Resolved: 2018-03-05

## 2018-03-05 PROBLEM — Z3A.08 8 WEEKS GESTATION OF PREGNANCY: Status: ACTIVE | Noted: 2018-03-05

## 2018-03-05 PROBLEM — R04.0 BLEEDING NOSE: Status: RESOLVED | Noted: 2017-01-07 | Resolved: 2018-03-05

## 2018-03-05 PROBLEM — O36.8130 DECREASED FETAL MOVEMENT DURING PREGNANCY IN THIRD TRIMESTER, ANTEPARTUM: Status: RESOLVED | Noted: 2017-02-05 | Resolved: 2018-03-05

## 2018-03-05 LAB
HBA1C MFR BLD HPLC: 12.1 %
HCG URINE, QL. (POC): POSITIVE
VALID INTERNAL CONTROL?: YES

## 2018-03-05 RX ORDER — ALBUTEROL SULFATE 90 UG/1
2 AEROSOL, METERED RESPIRATORY (INHALATION)
Qty: 1 INHALER | Refills: 5 | Status: SHIPPED | OUTPATIENT
Start: 2018-03-05 | End: 2019-02-04 | Stop reason: SDUPTHER

## 2018-03-05 RX ORDER — METFORMIN HYDROCHLORIDE 500 MG/1
1000 TABLET ORAL 2 TIMES DAILY WITH MEALS
Qty: 120 TAB | Refills: 5 | Status: SHIPPED | OUTPATIENT
Start: 2018-03-05 | End: 2018-08-14 | Stop reason: SDUPTHER

## 2018-03-05 RX ORDER — GLYBURIDE 5 MG/1
5 TABLET ORAL 2 TIMES DAILY WITH MEALS
Qty: 60 TAB | Refills: 5 | Status: SHIPPED | OUTPATIENT
Start: 2018-03-05 | End: 2018-08-14 | Stop reason: SDUPTHER

## 2018-03-05 NOTE — PROGRESS NOTES
Subjective:     Apurva Schulz is a 27 y.o. female seen for follow-up of diabetes. Has + HCG, did not realize she was pregnant, tearful with the news. Been off almost all her meds inc Dm ones, just didn't refill them. LMP was Dec. They tend to be irreg. Not on any [facility]  She has had hypoglycemic attacks. .no  Blood sugar control has been unknown  She has diabetes and hypertension. Apurva Schulz has the additional concern of wants to quit smoking    Diabetic Review of Systems: no polyuria or polydipsia, no chest pain, dyspnea or TIA's, no numbness, tingling or pain in extremities, no hypoglycemia. Allergies   Allergen Reactions    Mirapex [Pramipexole] Drowsiness    Morphine Shortness of Breath and Rash    Tramadol Other (comments)     headache       Diet and Lifestyle: smoker < 0.5 pack per day.     Patient Active Problem List    Diagnosis Date Noted    8 weeks gestation of pregnancy 03/05/2018    Back pain in pregnancy 01/30/2017     Class: Present on Admission    Diarrhea, unspecified 01/30/2017     Class: Present on Admission    Hyperglycemia due to type 2 diabetes mellitus ([address] 03/23/2016    Hypertension 03/23/2016    Asthma 03/23/2016     Allergies   Allergen Reactions    Mirapex [Pramipexole] Drowsiness    Morphine Shortness of Breath and Rash    Tramadol Other (comments)     headache     Social History   Substance Use Topics    Smoking status: Current Every Day Smoker     Packs/day: 0.50     Years: 15.00     Types: Cigarettes     Start date: 3/23/2004     Last attempt to quit: 1/1/2017    Smokeless tobacco: Never Used    Alcohol use No        Lab Results  Component Value Date/Time   Hemoglobin A1c 8.1 (H) 08/19/2016 10:54 AM   Hemoglobin A1c 10.7 (H) 05/10/2016 08:46 AM   Hemoglobin A1c, External 13.5 04/10/2015   Glucose 91 03/12/2017 05:34 PM   Glucose (POC) 109 (H) 03/16/2017 07:23 AM   Microalb/Creat ratio (ug/mg creat.) 5.7 06/21/2016 08:45 AM   Creatinine 0.57 03/12/2017 05:34 PM HCG urine +     Review of Systems  Pertinent items are noted in HPI. Objective:     Significant for the following:     Visit Vitals    /80 (BP 1 Location: Left arm, BP Patient Position: Sitting)    Pulse 97    Temp 98.2 °F (36.8 °C) (Oral)    Resp 18    Ht 5' 6\" (1.676 m)    Wt 250 lb (113.4 kg)    LMP 12/05/2017    SpO2 97%    BMI 40.35 kg/m2     Appearance: alert, well appearing, and in no distress. Exam: heart sounds normal rate, regular rhythm, normal S1, S2, no murmurs, rubs, clicks or gallops, chest clear  Foot exam: deferred    Lab review: labs reviewed, I note that glycosylated hemoglobin abnormal elevated.  12.1. Assessment/Plan:     Pregnant sent back to see her Ob GYn  Follow-up diabetes poorly controlled, needs further observation, needs improvement, patient poorly compliant, needs to quit smoking. Diabetic issues reviewed with her: diabetic diet discussed in detail, written exchange diet given and all medications, side effects and compliance discussed carefully. May need insulin if diabetes does not improve but will start her back on her orals. Chronic Conditions Addressed Today     1. Hyperglycemia due to type 2 diabetes mellitus (HCC)     Relevant Medications     metFORMIN (GLUCOPHAGE) 500 mg tablet     glyBURIDE (DIABETA) 5 mg tablet     Other Relevant Orders     AMB POC HEMOGLOBIN A1C (Completed)     MICROALBUMIN, UR, RAND W/ MICROALB/CREAT RATIO    2.  Asthma     Relevant Medications     albuterol (PROAIR HFA) 90 mcg/actuation inhaler      Acute Diagnoses Addressed Today     Less than 8 weeks gestation of pregnancy    -  Primary        Relevant Orders        REFERRAL TO OBSTETRICS AND GYNECOLOGY    Oligomenorrhea, unspecified type            Relevant Medications        metFORMIN (GLUCOPHAGE) 500 mg tablet        Other Relevant Orders        AMB POC URINE PREGNANCY TEST, VISUAL COLOR COMPARISON (Completed)        Follow-up Disposition:  Return in about 4 weeks (around 4/2/2018) for routine follow up.

## 2018-03-05 NOTE — PROGRESS NOTES
Chief Complaint   Patient presents with    Physical     I have reviewed the patient's medical history in detail and updated the computerized patient record. Health Maintenance reviewed. 1. Have you been to the ER, urgent care clinic since your last visit? Hospitalized since your last visit? Yes    2. Have you seen or consulted any other health care providers outside of the [address] [place] [address] [place] since your last visit? Include any pap smears or colon screening. [facility] - ER     Encouraged pt to discuss pt's wishes with spouse/partner/family and bring them in the next appt to follow thru with the Advanced Directive    Fall Risk Assessment, last 12 mths 3/5/2018   Able to walk? Yes   Fall in past 12 months? No       PHQ over the last two weeks 3/5/2018   Little interest or pleasure in doing things Several days   Feeling down, depressed or hopeless Several days   Total Score PHQ 2 2       Abuse Screening Questionnaire 3/5/2018   Do you ever feel afraid of your partner? N   Are you in a relationship with someone who physically or mentally threatens you? N   Is it safe for you to go home?  Y       ADL Assessment 3/5/2018   Feeding yourself No Help Needed   Getting from bed to chair No Help Needed   Getting dressed No Help Needed   Bathing or showering No Help Needed   Walk across the room (includes cane/walker) No Help Needed   Using the telphone No Help Needed   Taking your medications No Help Needed   Preparing meals No Help Needed   Managing money (expenses/bills) No Help Needed   Moderately strenuous housework (laundry) No Help Needed   Shopping for personal items (toiletries/medicines) No Help Needed   Shopping for groceries No Help Needed   Driving No Help Needed   Climbing a flight of stairs No Help Needed   Getting to places beyond walking distances No Help Needed

## 2018-03-05 NOTE — MR AVS SNAPSHOT
[address] [address] [facility] 
[phone] Patient: Santiago Pérez MRN: [id number] :1985 Visit Information Date & Time Provider Department Dept. Phone Encounter #  
 3/5/2018  1:00 PM Cristine Salvador MD [place] [address] [phone] [id number] Follow-up Instructions Return in about 4 weeks (around 2018) for routine follow up. Upcoming Health Maintenance Date Due Pneumococcal 19-64 Medium Risk (1 of 1 - PPSV23) 10/14/2002 LIPID PANEL Q1 4/10/2016 PAP AKA CERVICAL CYTOLOGY 2017 MICROALBUMIN Q1 2017 EYE EXAM RETINAL OR DILATED Q1 2017 Influenza Age 5 to Adult 2017 HEMOGLOBIN A1C Q6M 2017 FOOT EXAM Q1 2017 DTaP/Tdap/Td series (2 - Td) 2026 Allergies as of 3/5/2018  Review Complete On: 3/5/2018 By: Alia Cotter LPN Severity Noted Reaction Type Reactions Mirapex [Pramipexole]  2016    Drowsiness Morphine  2016    Shortness of Breath, Rash Tramadol  05/10/2016    Other (comments)  
 headache Current Immunizations  Reviewed on 3/5/2018 Name Date Influenza Vaccine 10/1/2016 MMR 3/16/2017  9:38 AM  
 Tdap 2016 Reviewed by Cristine Salvador MD on 3/5/2018 at  1:24 PM  
You Were Diagnosed With   
  
 Codes Comments Less than 8 weeks gestation of pregnancy    -  Primary ICD-10-CM: Z3A.01 
ICD-9-CM: V22.2 Type 2 diabetes mellitus with hyperglycemia, without long-term current use of insulin (HCC)     ICD-10-CM: E11.65 ICD-9-CM: 250.00, 790.29 Oligomenorrhea, unspecified type     ICD-10-CM: N91.5 ICD-9-CM: 626.1 Mild intermittent asthma without complication     JHK-26-KK: J45.20 ICD-9-CM: 493.90 Vitals BP Pulse Temp Resp Height(growth percentile) Weight(growth percentile)  133/80 (BP 1 Location: Left arm, BP Patient Position: Sitting) 97 98.2 °F (36.8 °C) (Oral) 18 5' 6\" (1.676 m) 250 lb (113.4 kg) LMP SpO2 BMI OB Status Smoking Status 12/05/2017 97% 40.35 kg/m2 Recent pregnancy Current Every Day Smoker BMI and BSA Data Body Mass Index Body Surface Area  
 40.35 kg/m 2 2.3 m 2 Preferred Pharmacy Pharmacy Name Phone 150 [facility], [address] [facility] [address] [facility] [phone] Your Updated Medication List  
  
   
This list is accurate as of 3/5/18  2:01 PM.  Always use your most recent med list.  
  
  
  
  
 * albuterol 2.5 mg /3 mL (0.083 %) nebulizer solution Commonly known as:  PROVENTIL VENTOLIN  
3 mL by Nebulization route every four (4) hours as needed for Wheezing. * albuterol 90 mcg/actuation inhaler Commonly known as:  PROAIR HFA Take 2 Puffs by inhalation every four (4) hours as needed for Wheezing. Blood-Glucose Meter monitoring kit Use up to three times per days  
  
 glyBURIDE 5 mg tablet Commonly known as:  Junior Michael Take 1 Tab by mouth two (2) times daily (with meals). metFORMIN 500 mg tablet Commonly known as:  GLUCOPHAGE Take 2 Tabs by mouth two (2) times daily (with meals). * Notice: This list has 2 medication(s) that are the same as other medications prescribed for you. Read the directions carefully, and ask your doctor or other care provider to review them with you. Prescriptions Sent to Pharmacy Refills  
 metFORMIN (GLUCOPHAGE) 500 mg tablet 5 Sig: Take 2 Tabs by mouth two (2) times daily (with meals). Class: Normal  
 Pharmacy: [address] [place] [address] Ph #: [phone] Route: Oral  
 glyBURIDE (DIABETA) 5 mg tablet 5 Sig: Take 1 Tab by mouth two (2) times daily (with meals). Class: Normal  
 Pharmacy: [address] [place] [address] Ph #: [phone]  Route: Oral  
 albuterol (PROAIR HFA) 90 mcg/actuation inhaler 5  
 Sig: Take 2 Puffs by inhalation every four (4) hours as needed for Wheezing. Class: Normal  
 Pharmacy: [facility], [address] [facility] #: [phone] Route: Inhalation We Performed the Following AMB POC HEMOGLOBIN A1C [20095 CPT(R)] AMB POC URINE PREGNANCY TEST, VISUAL COLOR COMPARISON [38314 CPT(R)] MICROALBUMIN, UR, RAND W/ MICROALB/CREAT RATIO [id number] CPT(R)] REFERRAL TO OBSTETRICS AND GYNECOLOGY [REF51 Custom] Follow-up Instructions Return in about 4 weeks (around 4/2/2018) for routine follow up. Referral Information Referral ID Referred By Referred To  
  
 [id number] [facility], [address] [facility], [address] [facility] Visits Status Start Date End Date 1 New Request 3/5/18 3/5/19 If your referral has a status of pending review or denied, additional information will be sent to support the outcome of this decision. Patient Instructions Learning About Meal Planning for Diabetes Why plan your meals? Meal planning can be a key part of managing diabetes. Planning meals and snacks with the right balance of carbohydrate, protein, and fat can help you keep your blood sugar at the target level you set with your doctor. You don't have to eat special foods. You can eat what your family eats, including sweets once in a while. But you do have to pay attention to how often you eat and how much you eat of certain foods. You may want to work with a dietitian or a certified diabetes educator. He or she can give you tips and meal ideas and can answer your questions about meal planning. This health professional can also help you reach a healthy weight if that is one of your goals. What plan is right for you? Your dietitian or diabetes educator may suggest that you start with the plate format or carbohydrate counting. The plate format The plate format is a simple way to help you manage how you eat. You plan meals by learning how much space each food should take on a plate. Using the plate format helps you spread carbohydrate throughout the day. It can make it easier to keep your blood sugar level within your target range. It also helps you see if you're eating healthy portion sizes. To use the plate format, you put non-starchy vegetables on half your plate. Add meat or meat substitutes on one-quarter of the plate. Put a grain or starchy vegetable (such as brown rice or a potato) on the final quarter of the plate. You can add a small piece of fruit and some low-fat or fat-free milk or yogurt, depending on your carbohydrate goal for each meal. 
Here are some tips for using the plate format: · Make sure that you are not using an oversized plate. A 9-inch plate is best. Many restaurants use larger plates. · Get used to using the plate format at home. Then you can use it when you eat out. · Write down your questions about using the plate format. Talk to your doctor, a dietitian, or a diabetes educator about your concerns. Carbohydrate counting With carbohydrate counting, you plan meals based on the amount of carbohydrate in each food. Carbohydrate raises blood sugar higher and more quickly than any other nutrient. It is found in desserts, breads and cereals, and fruit. It's also found in starchy vegetables such as potatoes and corn, grains such as rice and pasta, and milk and yogurt. Spreading carbohydrate throughout the day helps keep your blood sugar levels within your target range. Your daily amount depends on several things, including your weight, how active you are, which diabetes medicines you take, and what your goals are for your blood sugar levels. A registered dietitian or diabetes educator can help you plan how much carbohydrate to include in each meal and snack. A guideline for your daily amount of carbohydrate is: · 45 to 60 grams at each meal. That's about the same as 3 to 4 carbohydrate servings. · 15 to 20 grams at each snack. That's about the same as 1 carbohydrate serving. The Nutrition Facts label on packaged foods tells you how much carbohydrate is in a serving of the food. First, look at the serving size on the food label. Is that the amount you eat in a serving? All of the nutrition information on a food label is based on that serving size. So if you eat more or less than that, you'll need to adjust the other numbers. Total carbohydrate is the next thing you need to look for on the label. If you count carbohydrate servings, one serving of carbohydrate is 15 grams. For foods that don't come with labels, such as fresh fruits and vegetables, you'll need a guide that lists carbohydrate in these foods. Ask your doctor, dietitian, or diabetes educator about books or other nutrition guides you can use. If you take insulin, you need to know how many grams of carbohydrate are in a meal. This lets you know how much rapid-acting insulin to take before you eat. If you use an insulin pump, you get a constant rate of insulin during the day. So the pump must be programmed at meals to give you extra insulin to cover the rise in blood sugar after meals. When you know how much carbohydrate you will eat, you can take the right amount of insulin. Or, if you always use the same amount of insulin, you need to make sure that you eat the same amount of carbohydrate at meals. If you need more help to understand carbohydrate counting and food labels, ask your doctor, dietitian, or diabetes educator. How do you get started with meal planning? Here are some tips to get started: 
· Plan your meals a week at a time. Don't forget to include snacks too. · Use cookbooks or online recipes to plan several main meals. Plan some quick meals for busy nights.  You also can double some recipes that freeze well. Then you can save half for other busy nights when you don't have time to cook. · Make sure you have the ingredients you need for your recipes. If you're running low on basic items, put these items on your shopping list too. · List foods that you use to make breakfasts, lunches, and snacks. List plenty of fruits and vegetables. · Post this list on the refrigerator. Add to it as you think of more things you need. · Take the list to the store to do your weekly shopping. Follow-up care is a key part of your treatment and safety. Be sure to make and go to all appointments, and call your doctor if you are having problems. It's also a good idea to know your test results and keep a list of the medicines you take. Where can you learn more? Go to http://sheree-frank.info/. Reggie Rivera in the search box to learn more about \"Learning About Meal Planning for Diabetes. \" Current as of: March 13, 2017 Content Version: 11.4 © 1495-7996 [employer]. Care instructions adapted under license by [employer] (which disclaims liability or warranty for this information). If you have questions about a medical condition or this instruction, always ask your healthcare professional. [address] any warranty or liability for your use of this information. Introducing [facility]! [facility] introduces [employer] patient portal. Now you can access parts of your medical record, email your doctor's office, and request medication refills online. 1. In your internet browser, go to [email or web address] 2. Click on the First Time User? Click Here link in the Sign In box. You will see the New Member Sign Up page. 3. Enter your [employer] Access Code exactly as it appears below. You will not need to use this code after youve completed the sign-up process. If you do not sign up before the expiration date, you must request a new code. · broadbandchoices Access Code: [email or web address] Expires: 6/3/2018 12:54 PM 
 
4. Enter the last four digits of your Social Security Number (xxxx) and Date of Birth (mm/dd/yyyy) as indicated and click Submit. You will be taken to the next sign-up page. 5. Create a broadbandchoices ID. This will be your broadbandchoices login ID and cannot be changed, so think of one that is secure and easy to remember. 6. Create a broadbandchoices password. You can change your password at any time. 7. Enter your Password Reset Question and Answer. This can be used at a later time if you forget your password. 8. Enter your e-mail address. You will receive e-mail notification when new information is available in [address] 9. Click Sign Up. You can now view and download portions of your medical record. 10. Click the Download Summary menu link to download a portable copy of your medical information. If you have questions, please visit the Frequently Asked Questions section of the broadbandchoices website. Remember, broadbandchoices is NOT to be used for urgent needs. For medical emergencies, dial 911. Now available from your iPhone and Android! Please provide this summary of care documentation to your next provider. Your primary care clinician is listed as Tank London. If you have any questions after today's visit, please call [phone].

## 2018-03-05 NOTE — PATIENT INSTRUCTIONS

## 2018-03-20 ENCOUNTER — TELEPHONE (OUTPATIENT)
Dept: INTERNAL MEDICINE CLINIC | Age: 33
End: 2018-03-20

## 2018-03-20 NOTE — TELEPHONE ENCOUNTER
Aureliano Garza, from [facility], called in reference to needing confirmation of pregnancy from either the urine or blood. Please fax to [phone].

## 2018-03-22 LAB — HBA1C MFR BLD HPLC: 11.4 %

## 2018-03-26 ENCOUNTER — OFFICE VISIT (OUTPATIENT)
Dept: ENDOCRINOLOGY | Age: 33
End: 2018-03-26

## 2018-03-26 ENCOUNTER — HOSPITAL ENCOUNTER (OUTPATIENT)
Dept: DIABETES SERVICES | Age: 33
Discharge: HOME | End: 2018-03-26
Payer: MEDICAID

## 2018-03-26 VITALS
WEIGHT: 252 LBS | HEART RATE: 101 BPM | DIASTOLIC BLOOD PRESSURE: 57 MMHG | SYSTOLIC BLOOD PRESSURE: 105 MMHG | HEIGHT: 66 IN | BODY MASS INDEX: 40.5 KG/M2

## 2018-03-26 DIAGNOSIS — O24.111 PRE-EXISTING TYPE 2 DIABETES MELLITUS DURING PREGNANCY IN FIRST TRIMESTER: Primary | ICD-10-CM

## 2018-03-26 DIAGNOSIS — O24.414 GESTATIONAL DIABETES REQUIRING INSULIN: ICD-10-CM

## 2018-03-26 PROBLEM — E66.01 OBESITY, MORBID (HCC): Status: ACTIVE | Noted: 2018-03-26

## 2018-03-26 PROCEDURE — G0108 DIAB MANAGE TRN  PER INDIV: HCPCS | Performed by: DIETITIAN, REGISTERED

## 2018-03-26 NOTE — DIABETES MGMT
18      Thank you for your kind referral. Your patient, Rachel Hawkins attended a Type 2 diabetes during pregnancy education session at [place] [address] where the following topics were covered today:    *Describing diabetes disease process and treatment options   *Incorporating nutrition management into lifestyle   *Monitoring blood glucose and other parameters and interpreting and using the results  for self-management decision making   *Preventing, detecting and treating acute complications   * Incorporating physical activity into lifestyle   * Using medication(s) safely and for maximum therapeutic benefit   * Develop personal strategies to promote health and behavior change  * States relationship of blood glucose control in pregnancy and outcomes for mother and baby  * importance of glycemic control in early pregnancy  * CHO counting      Patient presents with supportive mother. Diagnosed with Type 2 when she was 16yo. - EDC; Dec 2018. Currently 5 weeks pregnant. A1c 3/5/18 was 12.1%. Was not taking any diabetes medications at this time. Has since resumed her Glyburide 5 mg BID and Metformin 500 mg BID. States that she did not go to her regular doctor for 8 months after delivering her last daughter 3/17. Prior to finding out she was pregnant, she states she was drinking regular soda daily. Patient does not know how to count CHO. A typical day is as follows:  Pt wakes around 7:00 am.  She will typically skip breakfast but if she does eat, she will have a boiled egg and toast, or 2 cups corn flakes with sweet & low and 2% milk   Yesterday at lunch (1:00pm) she ate a [employer] TV dinner and vitamin water  Yesterday she had sausage and pepper penne- ~1cup  She goes to bed around 10:00pm:10:30pm    Encouraged patient to eat at the same time everyday, not skip meals, recommended 45 gram CHO at meals, 30 grams for HS snack.   If patient is nauseous during the day, recommended 30 Breakfast/15 snack/30 Lunch /15snack /40 dinner/30 hs snack, omit regular soda     Your patient will have a follow up appointment in ~1 month. Their next visit will coinside with endocrinologist Dr. Danie Pérez. We look forward to assisting your patient in meeting their self-management goals.  If you have any questions, please do not hesitate to call the [facility] 143 at [phone]    Sincerely, Job Going, [address] [place] [address], [place] [address] [address] [place] [address], [facility] N. Ashley Ocampo.  Phone: [phone] Fax: [phone]

## 2018-03-29 ENCOUNTER — TELEPHONE (OUTPATIENT)
Dept: ENDOCRINOLOGY | Age: 33
End: 2018-03-29

## 2018-03-29 NOTE — TELEPHONE ENCOUNTER
See blood sugar readings. Patient states was diagnosed with a vaginal infrection yesterday. Will start treatment today. Advised to keep an eye on her blood sugars. If they continue to rise, call to report, but continue same medications. Explained that any infection that the body may have can potentially raise blood sugars. Patient expressed understanding.

## 2018-03-29 NOTE — TELEPHONE ENCOUNTER
Patient stated that she was told to call us if blood sugar level was high. Her reading this morning was 181 with fasting.  She can be contacted at [phone]

## 2018-03-31 ENCOUNTER — APPOINTMENT (OUTPATIENT)
Dept: ULTRASOUND IMAGING | Age: 33
End: 2018-03-31
Payer: MEDICAID

## 2018-03-31 ENCOUNTER — HOSPITAL ENCOUNTER (EMERGENCY)
Age: 33
Discharge: HOME | End: 2018-03-31
Attending: EMERGENCY MEDICINE
Payer: MEDICAID

## 2018-03-31 VITALS
TEMPERATURE: 98 F | HEIGHT: 65 IN | HEART RATE: 84 BPM | OXYGEN SATURATION: 98 % | DIASTOLIC BLOOD PRESSURE: 68 MMHG | SYSTOLIC BLOOD PRESSURE: 112 MMHG | RESPIRATION RATE: 16 BRPM | WEIGHT: 250 LBS | BODY MASS INDEX: 41.65 KG/M2

## 2018-03-31 DIAGNOSIS — O02.1 MISSED ABORTION WITH FETAL DEMISE BEFORE 20 COMPLETED WEEKS OF GESTATION: Primary | ICD-10-CM

## 2018-03-31 DIAGNOSIS — N93.9 VAGINAL BLEEDING: ICD-10-CM

## 2018-03-31 LAB
ABO + RH BLD: NORMAL
BLOOD BANK CMNT PATIENT-IMP: NORMAL
HCG SERPL-ACNC: 3866 MIU/ML (ref 0–6)

## 2018-03-31 PROCEDURE — 84702 CHORIONIC GONADOTROPIN TEST: CPT | Performed by: PHYSICIAN ASSISTANT

## 2018-03-31 PROCEDURE — 74011250637 HC RX REV CODE- 250/637: Performed by: EMERGENCY MEDICINE

## 2018-03-31 PROCEDURE — 88305 TISSUE EXAM BY PATHOLOGIST: CPT | Performed by: EMERGENCY MEDICINE

## 2018-03-31 PROCEDURE — 36415 COLL VENOUS BLD VENIPUNCTURE: CPT | Performed by: PHYSICIAN ASSISTANT

## 2018-03-31 PROCEDURE — 76817 TRANSVAGINAL US OBSTETRIC: CPT

## 2018-03-31 PROCEDURE — 86900 BLOOD TYPING SEROLOGIC ABO: CPT | Performed by: EMERGENCY MEDICINE

## 2018-03-31 PROCEDURE — 99283 EMERGENCY DEPT VISIT LOW MDM: CPT

## 2018-03-31 PROCEDURE — 76801 OB US < 14 WKS SINGLE FETUS: CPT

## 2018-03-31 RX ORDER — OXYCODONE AND ACETAMINOPHEN 5; 325 MG/1; MG/1
1 TABLET ORAL
Status: COMPLETED | OUTPATIENT
Start: 2018-03-31 | End: 2018-03-31

## 2018-03-31 RX ORDER — IBUPROFEN 400 MG/1
400 TABLET ORAL
Qty: 30 TAB | Refills: 0 | Status: SHIPPED | OUTPATIENT
Start: 2018-03-31 | End: 2018-08-14 | Stop reason: SDUPTHER

## 2018-03-31 RX ADMIN — OXYCODONE HYDROCHLORIDE AND ACETAMINOPHEN 1 TABLET: 5; 325 TABLET ORAL at 13:14

## 2018-03-31 NOTE — ED PROVIDER NOTES
EMERGENCY DEPARTMENT HISTORY AND PHYSICAL EXAM      Date: 3/31/2018  Patient Name: Justin Cancer    History of Presenting Illness     Chief Complaint   Patient presents with    Vaginal Bleeding     Into triage via wheelchair for c/o abdominal cramping and vaginal bleeding x 1 hour. Pt states she is about 6 weeks pregnant-LMP 18    Pregnancy Problem       History Provided By: Patient    HPI: Justin Houston, 27 y.o. female with PMHx significant for DM, HTN, asthma, s/p C section, presents ambulatory to the ED with cc of gradually worsening vaginal bleeding during pregnancy, described as mild to moderately severe BRB per vagina x this morning. Pt reports no known modifying factors of her sxs. She c/o associated abd cramping, low back pain, and resolved nausea. Pt states she is using two sanitary pads per hour. /A0. LNMP 2018. She specifically denies any fevers, chills, vomiting, chest pain, shortness of breath, headache, rash, diarrhea, sweating or weight loss. Pt states she last engaged in sexual intercourse last week. PCP: Zeina Joy MD    There are no other complaints, changes, or physical findings at this time. Current Outpatient Prescriptions   Medication Sig Dispense Refill    pnv w/o calcium-iron fum-fa 27-1 mg tab Take  by mouth.  metFORMIN (GLUCOPHAGE) 500 mg tablet Take 2 Tabs by mouth two (2) times daily (with meals). 120 Tab 5    glyBURIDE (DIABETA) 5 mg tablet Take 1 Tab by mouth two (2) times daily (with meals). 60 Tab 5    albuterol (PROAIR HFA) 90 mcg/actuation inhaler Take 2 Puffs by inhalation every four (4) hours as needed for Wheezing. 1 Inhaler 5    albuterol (PROVENTIL VENTOLIN) 2.5 mg /3 mL (0.083 %) nebulizer solution 3 mL by Nebulization route every four (4) hours as needed for Wheezing.  100 Each 5    Blood-Glucose Meter monitoring kit Use up to three times per days 1 Kit 0       Past History     Past Medical History:  Past Medical History:   Diagnosis Date  Asthma     Diabetes ([facility] [address]     Hypertension        Past Surgical History:  Past Surgical History:   Procedure Laterality Date    HX  SECTION  2017    HX TYMPANOSTOMY         Family History:  Family History   Problem Relation Age of Onset    Diabetes Father     Heart Disease Father      CAD    Hypertension Father     Diabetes Mother     Hypertension Mother     Liver Disease Mother     Deep Vein Thrombosis Mother     Pulmonary Embolism Mother     Hypertension Sister     Deep Vein Thrombosis Sister     Pulmonary Embolism Sister     Diabetes Maternal Uncle     Cancer Maternal Grandmother      Lung Cancer       Social History:  Social History   Substance Use Topics    Smoking status: Current Every Day Smoker     Packs/day: 0.50     Years: 15.00     Types: Cigarettes     Start date: 3/23/2004     Last attempt to quit: 2017    Smokeless tobacco: Never Used    Alcohol use No       Allergies: Allergies   Allergen Reactions    Mirapex [Pramipexole] Drowsiness    Morphine Shortness of Breath and Rash    Tramadol Other (comments)     headache         Review of Systems   Review of Systems   Constitutional: Negative. Negative for activity change, appetite change, chills, fatigue, fever and unexpected weight change. HENT: Negative. Negative for congestion, hearing loss, rhinorrhea, sneezing and voice change. Eyes: Negative. Negative for pain and visual disturbance. Respiratory: Negative. Negative for apnea, cough, choking, chest tightness and shortness of breath. Cardiovascular: Negative. Negative for chest pain and palpitations. Gastrointestinal: Positive for abdominal pain and nausea. Negative for abdominal distention, blood in stool, diarrhea and vomiting. Genitourinary: Positive for vaginal bleeding. Negative for difficulty urinating, flank pain, frequency and urgency. No discharge   Musculoskeletal: Positive for back pain.  Negative for arthralgias, myalgias and neck stiffness. Skin: Negative. Negative for color change and rash. Neurological: Negative. Negative for dizziness, seizures, syncope, speech difficulty, weakness, numbness and headaches. Hematological: Negative for adenopathy. Psychiatric/Behavioral: Negative. Negative for agitation, behavioral problems, dysphoric mood and suicidal ideas. The patient is not nervous/anxious. Physical Exam   Physical Exam   Constitutional: She is oriented to person, place, and time. She appears well-developed and well-nourished. No distress. HENT:   Head: Normocephalic and atraumatic. Mouth/Throat: Oropharynx is clear and moist. No oropharyngeal exudate. Eyes: Conjunctivae and EOM are normal. Pupils are equal, round, and reactive to light. Right eye exhibits no discharge. Left eye exhibits no discharge. Neck: Normal range of motion. Neck supple. Cardiovascular: Normal rate, regular rhythm and intact distal pulses. Exam reveals no gallop and no friction rub. No murmur heard. Pulmonary/Chest: Effort normal and breath sounds normal. No respiratory distress. She has no wheezes. She has no rales. She exhibits no tenderness. Abdominal: Soft. Bowel sounds are normal. She exhibits no distension and no mass. There is no tenderness. There is no rebound and no guarding. Genitourinary:   Genitourinary Comments: Mild BRB from vagina   Musculoskeletal: Normal range of motion. She exhibits no edema. Lymphadenopathy:     She has no cervical adenopathy. Neurological: She is alert and oriented to person, place, and time. No cranial nerve deficit. Coordination normal.   Skin: Skin is warm and dry. No rash noted. No erythema. Psychiatric: She has a normal mood and affect. Nursing note and vitals reviewed.       Diagnostic Study Results     Labs -     Recent Results (from the past 12 hour(s))   BETA HCG, QT    Collection Time: 03/31/18  1:08 PM   Result Value Ref Range    Beta HCG, QT 3866 (H) 0 - 6 MIU/ML   BLOOD TYPE, (ABO+RH)    Collection Time: 03/31/18  1:33 PM   Result Value Ref Range    ABO/Rh(D) B POSITIVE     Comment SAMPLE NOT USABLE FOR CROSSMATCH        Radiologic Studies -   US PREG UTS < 14 WKS SNGL   Final Result      US UTS TRANSVAGINAL OB   Final Result   INDICATION:  Vaginal bleeding, pregnancy     COMPARISON:  1/10/2013     FINDINGS:  Realtime sonographic imaging of the pelvis was performed  transabdominally. Examination is limited due to patient obesity and incomplete  bladder distention. The uterus measures 11 x 6.4 x 5 cm. Within the uterus,  there is no evidence of intrauterine gestation. Neither ovary is visualized due  to bowel gas and large body habitus. The endometrium measures 11 mm.      To further evaluate the uterus, transvaginal sonography was necessary. The  examination demonstrates no evidence of intrauterine gestation. The endometrium  measures 19 mm. The ovaries are not visualized due to bowel gas and large body  habitus. No intrauterine gestation or adnexal masses visualized.     IMPRESSION  IMPRESSION:      1. Thickened endometrium, measuring 19 mm. No evidence of intrauterine  gestation. 2. Nonvisualization of the ovaries. No evidence of adnexal mass. 3. No pelvic free fluid. 4. Technically challenging study due to patient obesity.     Recommend correlation with dates and beta hCG, and consider serial beta hCG  analysis. CT Results  (Last 48 hours)    None        CXR Results  (Last 48 hours)    None            Medical Decision Making   I am the first provider for this patient. I reviewed the vital signs, available nursing notes, past medical history, past surgical history, family history and social history. Vital Signs-Reviewed the patient's vital signs.   Patient Vitals for the past 12 hrs:   Temp Pulse Resp BP SpO2   03/31/18 1315 - 86 16 118/65 99 %   03/31/18 1208 98 °F (36.7 °C) (!) 101 12 132/78 100 %       Pulse Oximetry Analysis - 100% on RA    Records Reviewed: Nursing Notes    Provider Notes (Medical Decision Making):   DDx: threatened AB, missed AB, complete AB    ED Course:   Initial assessment performed. The patients presenting problems have been discussed, and they are in agreement with the care plan formulated and outlined with them. I have encouraged them to ask questions as they arise throughout their visit. 2:32 PM  US tech noted a foreign body in the vaginal canal found during [address]. Cup given to pt to collect specimen to determine if foreign body is retained POC.     2:53 PM   Family present in room, updated on plan of care. Inspected specimen in cup. Appears to be organic tissue. Awaiting US report    3:03 PM   Pt reported to nursing less bleeding, less pain, and reports resolution of both sxs once the foreign body exited the vaginal canal.     Critical Care Time: none    Disposition:  3:05 PM  Mickey Bangura's  results have been reviewed with her. She has been counseled regarding her diagnosis. She verbally conveys understanding and agreement of the signs, symptoms, diagnosis, treatment and prognosis and additionally agrees to follow up as recommended with Dr. Tracie Chu MD in 24 - 48 hours. She also agrees with the care-plan and conveys that all of her questions have been answered. I have also put together some discharge instructions for her that include: 1) educational information regarding their diagnosis, 2) how to care for their diagnosis at home, as well a 3) list of reasons why they would want to return to the ED prior to their follow-up appointment, should their condition change. PLAN:  1. Current Discharge Medication List        2. Follow-up Information     None        Return to ED if worse     Diagnosis     Clinical Impression:   1. Missed  with fetal demise before 21 completed weeks of gestation        Attestations: This note is prepared by Kristel Asher, acting as Scribe for [employer]  Aleja Londono MD. The scribe's documentation has been prepared under my direction and personally reviewed by me in its entirety. I confirm that the note above accurately reflects all work, treatment, procedures, and medical decision making performed by me.

## 2018-03-31 NOTE — DISCHARGE INSTRUCTIONS
Miscarriage: Care Instructions  Your Care Instructions    The loss of a pregnancy can be very hard. You may wonder why it happened or blame yourself. Miscarriages are common and are not caused by exercise, stress, or sex. Most happen because the fertilized egg in the uterus does not develop normally. There is no treatment that can stop a miscarriage. As long as you do not have heavy blood loss, fever, weakness, or other signs of infection, you can let a miscarriage follow its own course. This can take several days. Your body will recover over the next several weeks. Having a miscarriage does not mean you cannot have a normal pregnancy in the future. The doctor has checked you carefully, but problems can develop later. If you notice any problems or new symptoms, get medical treatment right away. Follow-up care is a key part of your treatment and safety. Be sure to make and go to all appointments, and call your doctor if you are having problems. It's also a good idea to know your test results and keep a list of the medicines you take. How can you care for yourself at home? · You will probably have some vaginal bleeding for 1 to 2 weeks. It may be similar to or slightly heavier than a normal period. The bleeding should get lighter after a week. Use pads instead of tampons. You may use tampons during your next period, which should start in 3 to 6 weeks. · Take an over-the-counter pain medicine, such as acetaminophen (Tylenol), ibuprofen (Advil, Motrin), or naproxen (Aleve) for cramps. Read and follow all instructions on the label. You may have cramps for several days after the miscarriage. · Do not take two or more pain medicines at the same time unless the doctor told you to. Many pain medicines have acetaminophen, which is Tylenol. Too much acetaminophen (Tylenol) can be harmful. · Use a clear container to save any tissue that you pass. Take it to your doctor's office as soon as you can.   · Do not have sex until the bleeding stops. · You may return to your normal activities if you feel well enough to do so. But you should avoid heavy exercise until the bleeding stops. · If you plan to get pregnant again, check with your doctor. Most doctors suggest waiting until you have had at least one normal period before you try to get pregnant. · If you do not want to get pregnant, ask your doctor about birth control. You can get pregnant again before your next period starts if you are not using birth control. · You may be low in iron because of blood loss. Eat a balanced diet that is high in iron and vitamin C. Foods rich in iron include red meat, shellfish, eggs, beans, and leafy green vegetables. Foods high in vitamin C include citrus fruits, tomatoes, and broccoli. Talk to your doctor about whether you need to take iron pills or a multivitamin. · The loss of a pregnancy can be very hard. You may wonder why it happened and blame yourself. Talking to family members, friends, a counselor, or your doctor may help you cope with your loss. When should you call for help? Call 911 anytime you think you may need emergency care. For example, call if:  ? · You passed out (lost consciousness). ?Call your doctor now or seek immediate medical care if:  ? · You have severe vaginal bleeding. ? · You are dizzy or lightheaded, or you feel like you may faint. ? · You have new or worse pain in your belly or pelvis. ? · You have a fever. ? · You have vaginal discharge that smells bad. ? Watch closely for changes in your health, and be sure to contact your doctor if:  ? · You do not get better as expected. Where can you learn more? Go to http://sheree-frank.info/. Enter E802 in the search box to learn more about \"Miscarriage: Care Instructions. \"  Current as of: March 16, 2017  Content Version: 11.4  © 2736-8161 [employer], [employer].  Care instructions adapted under license by [employer] (which disclaims liability or warranty for this information). If you have questions about a medical condition or this instruction, always ask your healthcare professional. [address] any warranty or liability for your use of this information.

## 2018-03-31 NOTE — ED NOTES
Patient had a huge blood clot passed and Ultrasound tech collected it and informed Dr. Rodrick Jones.

## 2018-03-31 NOTE — ED NOTES
Dr. Alexa Andrew reviewed discharge instructions with the patient. The patient verbalized understanding. All questions and concerns were addressed. The patient declined a wheelchair and is discharged ambulatory in the care of family members with instructions and prescriptions in hand. Pt is alert and oriented x 4. Respirations are clear and unlabored.

## 2018-03-31 NOTE — ED NOTES
Assumed care from triage. Patient comes to the ED complaining of vaginal bleeding and lower abdominal pain. Patient states that she went to her OB yesterday to have an ultrasound and it was normal with regards to the baby being intrauterine. Patient states this morning she woke up with heavy vaginal bleeding with small clots.

## 2018-04-02 ENCOUNTER — OFFICE VISIT (OUTPATIENT)
Dept: INTERNAL MEDICINE CLINIC | Age: 33
End: 2018-04-02

## 2018-04-02 VITALS
WEIGHT: 248 LBS | BODY MASS INDEX: 41.32 KG/M2 | RESPIRATION RATE: 16 BRPM | HEART RATE: 99 BPM | DIASTOLIC BLOOD PRESSURE: 72 MMHG | HEIGHT: 65 IN | SYSTOLIC BLOOD PRESSURE: 121 MMHG | OXYGEN SATURATION: 99 % | TEMPERATURE: 97.1 F

## 2018-04-02 DIAGNOSIS — O99.011 ANEMIA AFFECTING PREGNANCY IN FIRST TRIMESTER: ICD-10-CM

## 2018-04-02 DIAGNOSIS — Z33.2 ABORTION IN FIRST TRIMESTER: Primary | ICD-10-CM

## 2018-04-02 DIAGNOSIS — M54.9 BACK PAIN IN PREGNANCY: ICD-10-CM

## 2018-04-02 DIAGNOSIS — O99.891 BACK PAIN IN PREGNANCY: ICD-10-CM

## 2018-04-02 DIAGNOSIS — E11.65 TYPE 2 DIABETES MELLITUS WITH HYPERGLYCEMIA, WITHOUT LONG-TERM CURRENT USE OF INSULIN (HCC): ICD-10-CM

## 2018-04-02 PROBLEM — R19.7 DIARRHEA, UNSPECIFIED: Status: RESOLVED | Noted: 2017-01-30 | Resolved: 2018-04-02

## 2018-04-02 PROBLEM — Z3A.08 8 WEEKS GESTATION OF PREGNANCY: Status: RESOLVED | Noted: 2018-03-05 | Resolved: 2018-04-02

## 2018-04-02 RX ORDER — FERROUS SULFATE 324(65)MG
324 TABLET, DELAYED RELEASE (ENTERIC COATED) ORAL
Qty: 100 TAB | Refills: 5 | Status: SHIPPED | OUTPATIENT
Start: 2018-04-02 | End: 2018-08-14 | Stop reason: SDUPTHER

## 2018-04-02 RX ORDER — OXYCODONE AND ACETAMINOPHEN 5; 325 MG/1; MG/1
1 TABLET ORAL
Qty: 10 TAB | Refills: 0 | Status: SHIPPED | OUTPATIENT
Start: 2018-04-02 | End: 2018-08-14 | Stop reason: ALTCHOICE

## 2018-04-02 NOTE — PROGRESS NOTES
Subjective:     Umair Arevalo is a 27 y.o. female seen for follow-up of diabetes. carried, started bleeding 5-6 days ago, passed clots  Seen in ER, passed the fetus. C/o pain in back, still bleeding, DC on motrin not helping and causing nausea. Sees DrEspinosa in 2 days, still passing blood. Sees [place] for DM last A1C was 12, reports BS much better mainly 100's, doing better with diet, has appt. She has had hypoglycemic attacks. .yes  Blood sugar control has been low of 68, afetr skipping meals  She has diabetes. Umair Arevalo has the additional concern of wants pain meds. For her back      Diabetic Review of Systems: no polyuria or polydipsia, no chest pain, dyspnea or TIA's, no numbness, tingling or pain in extremities. Allergies   Allergen Reactions    Mirapex [Pramipexole] Drowsiness    Morphine Shortness of Breath and Rash    Tramadol Other (comments)     headache       Diet and Lifestyle: smoker 5/day.     Social History   Substance Use Topics    Smoking status: Current Every Day Smoker     Packs/day: 0.33     Years: 15.00     Types: Cigarettes     Start date: 3/23/2004     Last attempt to quit: 1/1/2017    Smokeless tobacco: Never Used    Alcohol use No        Lab Results  Component Value Date/Time   WBC 13.8 (H) 03/14/2017 06:24 AM   HGB 9.0 (L) 03/14/2017 06:24 AM   HCT 28.8 (L) 03/14/2017 06:24 AM   PLATELET 265 84/59/0196 06:24 AM   MCV 73.5 (L) 03/14/2017 06:24 AM     Lab Results  Component Value Date/Time   Hemoglobin A1c 8.1 (H) 08/19/2016 10:54 AM   Hemoglobin A1c 10.7 (H) 05/10/2016 08:46 AM   Hemoglobin A1c, External 13.5 04/10/2015   Glucose 91 03/12/2017 05:34 PM   Glucose (POC) 109 (H) 03/16/2017 07:23 AM   Microalb/Creat ratio (ug/mg creat.) 5.7 06/21/2016 08:45 AM   Creatinine 0.57 03/12/2017 05:34 PM      Lab Results  Component Value Date/Time   LDL-C, External 128 04/10/2015     Lab Results  Component Value Date/Time   ALT (SGPT) 58 03/12/2017 05:34 PM   AST (SGOT) 26 03/12/2017 05:34 PM   Alk. phosphatase 132 (H) 03/12/2017 05:34 PM   Bilirubin, total 0.3 03/12/2017 05:34 PM   Albumin 2.5 (L) 03/12/2017 05:34 PM   Protein, total 5.7 (L) 03/12/2017 05:34 PM   INR 0.9 01/05/2017 08:49 PM   Prothrombin time 9.1 01/05/2017 08:49 PM   PLATELET 834 53/36/0373 06:24 AM       Lab Results  Component Value Date/Time   GFR est non-AA >60 03/12/2017 05:34 PM   GFR est AA >60 03/12/2017 05:34 PM   Creatinine 0.57 03/12/2017 05:34 PM   BUN 8 03/12/2017 05:34 PM   Sodium 140 03/12/2017 05:34 PM   Potassium 4.1 03/12/2017 05:34 PM   Chloride 106 03/12/2017 05:34 PM   CO2 22 03/12/2017 05:34 PM   Magnesium 1.8 12/24/2016 08:05 AM     Lab Results   Component Value Date/Time    Glucose 91 03/12/2017 05:34 PM    Glucose (POC) 109 (H) 03/16/2017 07:23 AM         Review of Systems  Pertinent items are noted in HPI. Objective:     Significant for the following:     Visit Vitals    /72 (BP 1 Location: Left arm, BP Patient Position: Sitting)    Pulse 99    Temp 97.1 °F (36.2 °C) (Oral)    Resp 16    Ht 5' 5\" (1.651 m)    Wt 248 lb (112.5 kg)    LMP 03/31/2018    SpO2 99%    BMI 41.27 kg/m2     Appearance: alert, well appearing, and in no distress. Exam: heart sounds normal rate, regular rhythm, normal S1, S2, no murmurs, rubs, clicks or gallops, chest clear  Abdo mild tender to palp  Foot exam: deferred    Lab review: labs reviewed, I note that glycosylated hemoglobin 12.1. Hgb was 9.0    Assessment/Plan:     Follow-up diabetes control uncertain, needs further observation. Diabetic issues reviewed with her: all medications, side effects and compliance discussed carefully, annual eye examinations at Ophthalmology discussed, glycohemoglobin and other lab monitoring discussed and patient urged in the strongest terms to quit smoking. Chronic Conditions Addressed Today     1. Hyperglycemia due to type 2 diabetes mellitus ([facility] [address]     Relevant Orders     REFERRAL TO OPTOMETRY    2. Back pain in pregnancy     Relevant Medications     oxyCODONE-acetaminophen (PERCOCET) 5-325 mg per tablet      Acute Diagnoses Addressed Today      in first trimester    -  Primary        Relevant Medications        oxyCODONE-acetaminophen (PERCOCET) 5-325 mg per tablet    Anemia affecting pregnancy in first trimester            Orders Placed This Encounter    REFERRAL TO OPTOMETRY     Referral Priority:   Routine     Referral Type:   Consultation     Referral Reason:   Specialty Services Required     Referred to Provider:   Jd Villanueva OD    oxyCODONE-acetaminophen (PERCOCET) 5-325 mg per tablet     Sig: Take 1 Tab by mouth every eight (8) hours as needed for Pain. Max Daily Amount: 3 Tabs. Dispense:  10 Tab     Refill:  0    ferrous sulfate 324 mg (65 mg iron) tablet     Sig: Take 1 Tab by mouth three (3) times daily (with meals). Dispense:  100 Tab     Refill:  5       We discussed this pain and the usage of controlled substances for  pain relief. In general these medications are indicated for the acute symptom relief and not for chronic usage, allthough they are frequently used for chronic management  After a certain period of time they should be stopped to avoid dependence and/or addiction. I warned her about the dangers of addiction and other side affects including respiratory depression and death. Discussed how this is a controlled substance and that the prescribing of it is should be monitored very carefully. Drug usage is also monitored by our practise and the [facility], since there has been a lot of abuse and diversion of controlled substances. In general we do not refill this medication over the phone. PLease ask for enough pills to get you to your next appointment and make sure you keep your appointments. Warned not to take other medications like alcohol, other benzodiazapines marijuana or other narcotics when on this medication.     Keep f/u with endocrine, do not skip meals.  Follow-up Disposition:  Return in about 4 months (around 8/2/2018) for routine follow up.

## 2018-04-02 NOTE — MR AVS SNAPSHOT
[address] 1409 [facility] 
[phone] Patient: Tereza Maharaj MRN: [id number] :[id number] Visit Information Date & Time Provider Department Dept. Phone Encounter #  
 2018  1:00 PM [place] [id number]  Follow-up Instructions Return in about 4 months (around 2018) for routine follow up. Your Appointments 2018 10:15 AM  
Follow Up with Charis Aldana MD  
[facility]) Appt Note: f/u    dm  
 [place] [address] [id number] [facility] Upcoming Health Maintenance Date Due Pneumococcal 19-64 Medium Risk (1 of 1 - PPSV23) 10/14/2002 LIPID PANEL Q1 4/10/2016 PAP AKA CERVICAL CYTOLOGY 2017 MICROALBUMIN Q1 2017 EYE EXAM RETINAL OR DILATED Q1 2017 Influenza Age 5 to Adult 2017 HEMOGLOBIN A1C Q6M 2018 FOOT EXAM Q1 3/26/2019 DTaP/Tdap/Td series (2 - Td) 2026 Allergies as of 2018  Review Complete On: 2018 By: Rodolfo Miles LPN Severity Noted Reaction Type Reactions Mirapex [Pramipexole]  2016    Drowsiness Morphine  2016    Shortness of Breath, Rash Tramadol  05/10/2016    Other (comments)  
 headache Current Immunizations  Reviewed on 3/5/2018 Name Date Influenza Vaccine 10/1/2016 MMR 3/16/2017  9:38 AM  
 Tdap 2016 Not reviewed this visit You Were Diagnosed With   
  
 Codes Comments  in first trimester    -  Primary ICD-10-CM: Z33.2 ICD-9-CM: 637.90 Type 2 diabetes mellitus with hyperglycemia, without long-term current use of insulin (HCC)     ICD-10-CM: E11.65 ICD-9-CM: 250.00, 790.29 Back pain in pregnancy     ICD-10-CM: O26.899, M54.9 ICD-9-CM: 646.80, 724.5 Anemia affecting pregnancy in first trimester     ICD-10-CM: O99.011 
ICD-9-CM: 648.23, 285.9 Vitals BP Pulse Temp Resp Height(growth percentile) Weight(growth percentile) 121/72 (BP 1 Location: Left arm, BP Patient Position: Sitting) 99 97.1 °F (36.2 °C) (Oral) 16 5' 5\" (1.651 m) 248 lb (112.5 kg) LMP SpO2 BMI OB Status Smoking Status 03/31/2018 99% 41.27 kg/m2 Recent pregnancy Current Every Day Smoker BMI and BSA Data Body Mass Index Body Surface Area  
 41.27 kg/m 2 2.27 m 2 Preferred Pharmacy Pharmacy Name Phone [address], [address] [phone] Your Updated Medication List  
  
   
This list is accurate as of 4/2/18  1:49 PM.  Always use your most recent med list.  
  
  
  
  
 * albuterol 2.5 mg /3 mL (0.083 %) nebulizer solution Commonly known as:  PROVENTIL VENTOLIN  
3 mL by Nebulization route every four (4) hours as needed for Wheezing. * albuterol 90 mcg/actuation inhaler Commonly known as:  PROAIR HFA Take 2 Puffs by inhalation every four (4) hours as needed for Wheezing. Blood-Glucose Meter monitoring kit Use up to three times per days  
  
 ferrous sulfate 324 mg (65 mg iron) tablet Take 1 Tab by mouth three (3) times daily (with meals). glyBURIDE 5 mg tablet Commonly known as:  Ceil  Take 1 Tab by mouth two (2) times daily (with meals). ibuprofen 400 mg tablet Commonly known as:  MOTRIN Take 1 Tab by mouth every eight (8) hours as needed for Pain.  
  
 metFORMIN 500 mg tablet Commonly known as:  GLUCOPHAGE Take 2 Tabs by mouth two (2) times daily (with meals). oxyCODONE-acetaminophen 5-325 mg per tablet Commonly known as:  PERCOCET Take 1 Tab by mouth every eight (8) hours as needed for Pain. Max Daily Amount: 3 Tabs. pnv w/o calcium-iron fum-fa 27-1 mg Tab Take  by mouth. * Notice: This list has 2 medication(s) that are the same as other medications prescribed for you. Read the directions carefully, and ask your doctor or other care provider to review them with you. Prescriptions Printed Refills  
 oxyCODONE-acetaminophen (PERCOCET) 5-325 mg per tablet 0 Sig: Take 1 Tab by mouth every eight (8) hours as needed for Pain. Max Daily Amount: 3 Tabs. Class: Print Route: Oral  
  
Prescriptions Sent to Pharmacy Refills  
 ferrous sulfate 324 mg (65 mg iron) tablet 5 Sig: Take 1 Tab by mouth three (3) times daily (with meals). Class: Normal  
 Pharmacy: [address] [place] [address], [address] [place] [address] #: [phone] Route: Oral  
  
We Performed the Following REFERRAL TO [facility] [id number] Custom] Comments:  
 Routine Dm eye Follow-up Instructions Return in about 4 months (around 8/2/2018) for routine follow up. Referral Information Referral ID Referred By Referred To  
  
 [id number] [facility], [address]  Phone: [phone] Fax: [phone] Visits Status Start Date End Date 1 New Request 4/2/18 4/2/19 If your referral has a status of pending review or denied, additional information will be sent to support the outcome of this decision. Introducing [facility]! [employer] introduces [employer] patient portal. Now you can access parts of your medical record, email your doctor's office, and request medication refills online. 1. In your internet browser, go to [email or web address] 2. Click on the First Time User? Click Here link in the Sign In box. You will see the New Member Sign Up page. 3. Enter your [employer] Access Code exactly as it appears below. You will not need to use this code after youve completed the sign-up process.  If you do not sign up before the expiration date, you must request a new code. · [employer] Access Code: [email or web address] Expires: 6/3/2018  1:54 PM 
 
4. Enter the last four digits of your Social Security Number (xxxx) and Date of Birth (mm/dd/yyyy) as indicated and click Submit. You will be taken to the next sign-up page. 5. Create a [employer] ID. This will be your [employer] login ID and cannot be changed, so think of one that is secure and easy to remember. 6. Create a [employer] password. You can change your password at any time. 7. Enter your Password Reset Question and Answer. This can be used at a later time if you forget your password. 8. Enter your e-mail address. You will receive e-mail notification when new information is available in [address] 9. Click Sign Up. You can now view and download portions of your medical record. 10. Click the Download Summary menu link to download a portable copy of your medical information. If you have questions, please visit the Frequently Asked Questions section of the [employer] website. Remember, [employer] is NOT to be used for urgent needs. For medical emergencies, dial 911. Now available from your iPhone and Android! Please provide this summary of care documentation to your next provider. Your primary care clinician is listed as Kathryne Jeans. If you have any questions after today's visit, please call [phone].

## 2018-04-02 NOTE — PROGRESS NOTES
Chief Complaint   Patient presents with    Diabetes     follow up          Miscarriage     Miscarriage 3/31/18 @2:15PM     I have reviewed the patient's medical history in detail and updated the computerized patient record. Health Maintenance reviewed. 1. Have you been to the ER, urgent care clinic since your last visit? Hospitalized since your last visit?no    2. Have you seen or consulted any other health care providers outside of the [address] [place] [address] [place] since your last visit? Include any pap smears or colon screening. No    Encouraged pt to discuss pt's wishes with spouse/partner/family and bring them in the next appt to follow thru with the Advanced Directive    Fall Risk Assessment, last 12 mths 3/5/2018   Able to walk? Yes   Fall in past 12 months? No       PHQ over the last two weeks 4/2/2018   Little interest or pleasure in doing things Several days   Feeling down, depressed or hopeless Several days   Total Score PHQ 2 2       Abuse Screening Questionnaire 3/5/2018   Do you ever feel afraid of your partner? N   Are you in a relationship with someone who physically or mentally threatens you? N   Is it safe for you to go home?  Y       ADL Assessment 3/5/2018   Feeding yourself No Help Needed   Getting from bed to chair No Help Needed   Getting dressed No Help Needed   Bathing or showering No Help Needed   Walk across the room (includes cane/walker) No Help Needed   Using the telphone No Help Needed   Taking your medications No Help Needed   Preparing meals No Help Needed   Managing money (expenses/bills) No Help Needed   Moderately strenuous housework (laundry) No Help Needed   Shopping for personal items (toiletries/medicines) No Help Needed   Shopping for groceries No Help Needed   Driving No Help Needed   Climbing a flight of stairs No Help Needed   Getting to places beyond walking distances No Help Needed

## 2018-08-14 ENCOUNTER — OFFICE VISIT (OUTPATIENT)
Dept: INTERNAL MEDICINE CLINIC | Age: 33
End: 2018-08-14

## 2018-08-14 VITALS
RESPIRATION RATE: 16 BRPM | SYSTOLIC BLOOD PRESSURE: 131 MMHG | HEART RATE: 74 BPM | BODY MASS INDEX: 39.65 KG/M2 | HEIGHT: 65 IN | TEMPERATURE: 97.5 F | WEIGHT: 238 LBS | DIASTOLIC BLOOD PRESSURE: 85 MMHG | OXYGEN SATURATION: 98 %

## 2018-08-14 DIAGNOSIS — Z30.09 FAMILY PLANNING: ICD-10-CM

## 2018-08-14 DIAGNOSIS — L02.33 CARBUNCLE OF BUTTOCK: ICD-10-CM

## 2018-08-14 DIAGNOSIS — E11.65 UNCONTROLLED TYPE 2 DIABETES MELLITUS WITH COMPLICATION, UNSPECIFIED WHETHER LONG TERM INSULIN USE: Primary | ICD-10-CM

## 2018-08-14 DIAGNOSIS — J45.20 MILD INTERMITTENT ASTHMA WITHOUT COMPLICATION: ICD-10-CM

## 2018-08-14 DIAGNOSIS — D50.0 IRON DEFICIENCY ANEMIA DUE TO CHRONIC BLOOD LOSS: ICD-10-CM

## 2018-08-14 DIAGNOSIS — I89.0 LYMPHEDEMA: ICD-10-CM

## 2018-08-14 DIAGNOSIS — E11.8 UNCONTROLLED TYPE 2 DIABETES MELLITUS WITH COMPLICATION, UNSPECIFIED WHETHER LONG TERM INSULIN USE: Primary | ICD-10-CM

## 2018-08-14 DIAGNOSIS — B37.31 YEAST VAGINITIS: ICD-10-CM

## 2018-08-14 DIAGNOSIS — F17.200 SMOKING: ICD-10-CM

## 2018-08-14 LAB
HCG URINE, QL. (POC): NEGATIVE
VALID INTERNAL CONTROL?: YES

## 2018-08-14 RX ORDER — GLYBURIDE 5 MG/1
5 TABLET ORAL 2 TIMES DAILY WITH MEALS
Qty: 60 TAB | Refills: 11 | Status: SHIPPED | OUTPATIENT
Start: 2018-08-14 | End: 2019-08-19 | Stop reason: ALTCHOICE

## 2018-08-14 RX ORDER — BUPROPION HYDROCHLORIDE 100 MG/1
100 TABLET ORAL 2 TIMES DAILY
Qty: 60 TAB | Refills: 5 | Status: SHIPPED | OUTPATIENT
Start: 2018-08-14 | End: 2018-10-18 | Stop reason: ALTCHOICE

## 2018-08-14 RX ORDER — IBUPROFEN 800 MG/1
800 TABLET ORAL
Qty: 90 TAB | Refills: 5 | Status: SHIPPED | OUTPATIENT
Start: 2018-08-14 | End: 2018-10-18 | Stop reason: ALTCHOICE

## 2018-08-14 RX ORDER — FERROUS SULFATE 324(65)MG
324 TABLET, DELAYED RELEASE (ENTERIC COATED) ORAL
Qty: 100 TAB | Refills: 5 | Status: SHIPPED | OUTPATIENT
Start: 2018-08-14 | End: 2019-08-19 | Stop reason: ALTCHOICE

## 2018-08-14 RX ORDER — METFORMIN HYDROCHLORIDE 500 MG/1
1000 TABLET ORAL 2 TIMES DAILY WITH MEALS
Qty: 120 TAB | Refills: 11 | Status: SHIPPED | OUTPATIENT
Start: 2018-08-14 | End: 2019-04-05

## 2018-08-14 RX ORDER — FLUCONAZOLE 150 MG/1
150 TABLET ORAL DAILY
Qty: 1 TAB | Refills: 0 | Status: SHIPPED | OUTPATIENT
Start: 2018-08-14 | End: 2018-08-15

## 2018-08-14 NOTE — MR AVS SNAPSHOT
[address] [place] [address]. [address] [place] [address] 
[phone] Patient: Emili Jacobson MRN: [id number] :1985 Visit Information Date & Time Provider Department Dept. Phone Encounter #  
 2018 11:00 AM Julia Hairston MD [place] [id number] [phone] [id number] Follow-up Instructions Return in about 3 months (around 2018) for full physical.  
  
Upcoming Health Maintenance Date Due Pneumococcal 19-64 Medium Risk (1 of 1 - PPSV23) 10/14/2002 LIPID PANEL Q1 4/10/2016 MICROALBUMIN Q1 2017 Influenza Age 5 to Adult 2018 PAP AKA CERVICAL CYTOLOGY 2018 HEMOGLOBIN A1C Q6M 2018 FOOT EXAM Q1 3/26/2019 EYE EXAM RETINAL OR DILATED Q1 2019 DTaP/Tdap/Td series (2 - Td) 2026 Allergies as of 2018  Review Complete On: 2018 By: Julia Hairston MD  
  
 Severity Noted Reaction Type Reactions Mirapex [Pramipexole]  2016    Drowsiness Morphine  2016    Shortness of Breath, Rash Tramadol  05/10/2016    Other (comments)  
 headache Current Immunizations  Reviewed on 2018 Name Date Influenza Vaccine 10/1/2016 MMR 3/16/2017  9:38 AM  
 Tdap 2016 Reviewed by Julia Hairston MD on 2018 at 11:31 AM  
 Reviewed by Julia Hairston MD on 2018 at 11:45 AM  
 Reviewed by Julia Hairston MD on 2018 at 11:45 AM  
You Were Diagnosed With   
  
 Codes Comments Uncontrolled type 2 diabetes mellitus with complication, unspecified whether long term insulin use (HCC)    -  Primary ICD-10-CM: E11.8, E11.65 ICD-9-CM: 250.92 Carbuncle of buttock     ICD-10-CM: L02.33 
ICD-9-CM: 680.5 Iron deficiency anemia due to chronic blood loss     ICD-10-CM: D50.0 ICD-9-CM: 280.0 Mild intermittent asthma without complication     YZP-59-NH: J45.20 ICD-9-CM: 493.90 Yeast vaginitis     ICD-10-CM: B37.3 ICD-9-CM: 112.1 Lymphedema     ICD-10-CM: I89.0 ICD-9-CM: 949.5 Family planning     ICD-10-CM: Z30.09 
ICD-9-CM: V25.09 Smoking     ICD-10-CM: F17.200 ICD-9-CM: 305.1 Vitals BP Pulse Temp Resp Height(growth percentile) Weight(growth percentile) 131/85 (BP 1 Location: Left arm, BP Patient Position: Sitting) 74 97.5 °F (36.4 °C) (Oral) 16 5' 5\" (1.651 m) 238 lb (108 kg) SpO2 BMI OB Status Smoking Status 98% 39.61 kg/m2 Recent pregnancy Current Every Day Smoker BMI and BSA Data Body Mass Index Body Surface Area  
 39.61 kg/m 2 2.23 m 2 Preferred Pharmacy Pharmacy Name Phone [address] [facility], [address] [facility] [address] [place] [address] [phone] Your Updated Medication List  
  
   
This list is accurate as of 8/14/18 12:05 PM.  Always use your most recent med list.  
  
  
  
  
 * albuterol 2.5 mg /3 mL (0.083 %) nebulizer solution Commonly known as:  PROVENTIL VENTOLIN  
3 mL by Nebulization route every four (4) hours as needed for Wheezing. * albuterol 90 mcg/actuation inhaler Commonly known as:  PROAIR HFA Take 2 Puffs by inhalation every four (4) hours as needed for Wheezing. Blood-Glucose Meter monitoring kit Use up to three times per days buPROPion 100 mg tablet Commonly known as:  [facility] Take 1 Tab by mouth two (2) times a day. Start 1 daily then increase then quit  
  
 ferrous sulfate 324 mg (65 mg iron) tablet Take 1 Tab by mouth three (3) times daily (with meals). fluconazole 150 mg tablet Commonly known as:  DIFLUCAN Take 1 Tab by mouth daily for 1 day. FDA advises cautious prescribing of oral fluconazole in pregnancy. glyBURIDE 5 mg tablet Commonly known as:  Danna Mate Take 1 Tab by mouth two (2) times daily (with meals). ibuprofen 800 mg tablet Commonly known as:  MOTRIN Take 1 Tab by mouth every eight (8) hours as needed for Pain.  
  
 metFORMIN 500 mg tablet Commonly known as:  GLUCOPHAGE Take 2 Tabs by mouth two (2) times daily (with meals). norethindrone-ethinyl estradiol 1-35 mg-mcg Tab Commonly known as:  ORTHO-NOVUM 1-35 TAB, NORTREL 1-35 TAB Take 1 Tab by mouth daily. * Notice: This list has 2 medication(s) that are the same as other medications prescribed for you. Read the directions carefully, and ask your doctor or other care provider to review them with you. Prescriptions Sent to Pharmacy Refills  
 fluconazole (DIFLUCAN) 150 mg tablet 0 Sig: Take 1 Tab by mouth daily for 1 day. FDA advises cautious prescribing of oral fluconazole in pregnancy. Class: Normal  
 Pharmacy: [address] [place] [address] Ph #: [phone] Route: Oral  
 metFORMIN (GLUCOPHAGE) 500 mg tablet 11 Sig: Take 2 Tabs by mouth two (2) times daily (with meals). Class: Normal  
 Pharmacy: [address] [place] [address] Ph #: [phone] Route: Oral  
 glyBURIDE (DIABETA) 5 mg tablet 11 Sig: Take 1 Tab by mouth two (2) times daily (with meals). Class: Normal  
 Pharmacy: [address] [place] [address] Ph #: [phone] Route: Oral  
 ferrous sulfate 324 mg (65 mg iron) tablet 5 Sig: Take 1 Tab by mouth three (3) times daily (with meals). Class: Normal  
 Pharmacy: [address] [place] [address] Ph #: [phone] Route: Oral  
 ibuprofen (MOTRIN) 800 mg tablet 5 Sig: Take 1 Tab by mouth every eight (8) hours as needed for Pain. Class: Normal  
 Pharmacy: [address] [place] [address] Ph #: [phone] Route: Oral  
 buPROPion (WELLBUTRIN) 100 mg tablet 5 Sig: Take 1 Tab by mouth two (2) times a day. Start 1 daily then increase then quit Class: Normal  
 Pharmacy: [address] [place] [address] Ph #: [phone]  Route: Oral  
 norethindrone-ethinyl estradiol (ORTHO-NOVUM 1-35 TAB, NORTREL 1-35 TAB) 1-35 mg-mcg tab 5 Sig: Take 1 Tab by mouth daily. Class: Normal  
 Pharmacy: [facility], [address] [facility] #: [phone] Route: Oral  
  
We Performed the Following AMB POC URINE PREGNANCY TEST, VISUAL COLOR COMPARISON [28534 CPT(R)] AMB SUPPLY ORDER [[id number] Custom] Comments:  
 Knee high med compression hose CBC W/O DIFF [15996 CPT(R)] [address] [[address] CPT(R)] HEMOGLOBIN A1C WITH EAG [81026 CPT(R)] METABOLIC PANEL, BASIC [73078 CPT(R)] MICROALBUMIN, UR, RAND [id number] CPT(R)] Follow-up Instructions Return in about 3 months (around 11/14/2018) for full physical.  
  
  
Patient Instructions Combination Birth Control Pills: Care Instructions Your Care Instructions Combination birth control pills are used to prevent pregnancy. They give you a regular dose of the hormones estrogen and progestin. You take a hormone pill every day to prevent pregnancy. Birth control pills come in packs. The most common type has 3 weeks of hormone pills. Some packs have sugar pills (they do not contain any hormones) for the fourth week. During that fourth no-hormone week, you have your period. After the fourth week (28 days), you start a new pack. Some birth control pills are packaged in different ways. For example, some have hormone pills for the fourth week instead of sugar pills. Taking hormones for the entire month causes you to not have periods or to have fewer periods. Others are packaged so that you have a period every 3 months. Your doctor will tell you what type of pills you have. Follow-up care is a key part of your treatment and safety. Be sure to make and go to all appointments, and call your doctor if you are having problems. It's also a good idea to know your test results and keep a list of the medicines you take. How can you care for yourself at home? How do you take the pill? · Follow your doctor's instructions about when to start taking your pills. Use backup birth control, such as a condom, or don't have intercourse for 7 days after you start your pills. · Take your pills every day, at about the same time of day. To help yourself do this, try to take them when you do something else every day, such as brushing your teeth. What if you forget to take a pill? Always read the label for specific instructions, or call your doctor. Here are some basic guidelines: · If you miss 1 hormone pill, take it as soon as you remember. Ask your doctor if you may need to use a backup birth control method, such as a condom, or not have intercourse. · If you miss 2 or more hormone pills, take one as soon as you remember you forgot them. Then read the pill label or call your doctor about instructions on how to take your missed pills. Use a backup method of birth control or don't have intercourse for 7 days. Pregnancy is more likely if you miss more than 1 pill. · If you had intercourse, you can use emergency contraception, such as the morning-after pill (Plan B). You can use emergency contraception for up to 5 days after having had intercourse, but it works best if you take it right away. What else do you need to know? · The pill has side effects. ¨ You may have very light or skipped periods. ¨ You may have bleeding between periods (spotting). This usually decreases after 3 to 4 months. ¨ You may have mood changes, less interest in sex, or weight gain. · The pill may reduce acne, heavy bleeding and cramping, and symptoms of premenstrual syndrome. · Check with your doctor before you use any other medicines, including over-the-counter medicines, vitamins, herbal products, and supplements. Birth control hormones may not work as well to prevent pregnancy when combined with other medicines. · The pill doesn't protect against sexually transmitted infection (STIs), such as herpes or HIV/AIDS. If you're not sure whether your sex partner might have an STI, use a condom to protect against disease. When should you call for help? Call your doctor now or seek immediate medical care if: 
  · You have severe belly pain.  
  · You have signs of a blood clot, such as: 
¨ Pain in your calf, back of the knee, thigh, or groin. ¨ Redness and swelling in your leg or groin.  
  · You have blurred vision or other problems seeing.  
  · You have a severe headache.  
  · You have severe trouble breathing.  
 Watch closely for changes in your health, and be sure to contact your doctor if: 
  · You think you might be pregnant.  
  · You think you may be depressed.  
  · You think you may have been exposed to or have a sexually transmitted infection. Where can you learn more? Go to http://sheree-frank.info/. Enter Z444 in the search box to learn more about \"Combination Birth Control Pills: Care Instructions. \" Current as of: November 21, 2017 Content Version: 11.7 © 1115-3271 [employer]. Care instructions adapted under license by [employer] (which disclaims liability or warranty for this information). If you have questions about a medical condition or this instruction, always ask your healthcare professional. [address] any warranty or liability for your use of this information. Deciding About Using Medicines To Quit Smoking Deciding About Using Medicines To Quit Smoking What are the medicines you can use? Your doctor may prescribe varenicline (Chantix) or bupropion (Zyban). These medicines can help you cope with cravings for tobacco. They are pills that don't contain nicotine. You also can use nicotine replacement products. These do contain nicotine. There are many types. · Gum and lozenges slowly release nicotine into your mouth. · Patches stick to your skin. They slowly release nicotine into your bloodstream. 
· An inhaler has a galindo that contains nicotine. You breathe in a puff of nicotine vapor through your mouth and throat. · Nasal spray releases a mist that contains nicotine. What are key points about this decision? · Using medicines can double your chances of quitting smoking. They can ease cravings and withdrawal symptoms. · Getting counseling along with using medicine can raise your chances of quitting even more. · If you smoke fewer than 5 cigarettes a day, you may not need medicines to help you quit smoking. · These medicines have less nicotine than cigarettes. And by itself, nicotine is not nearly as harmful as smoking. The tars, carbon monoxide, and other toxic chemicals in tobacco cause the harmful effects. · The side effects of nicotine replacement products depend on the type of product. For example, a patch can make your skin red and itchy. Medicines in pill form can make you sick to your stomach. They can also cause dry mouth and trouble sleeping. For most people, the side effects are not bad enough to make them stop using the products. Why might you choose to use medicines to quit smoking? · You have tried on your own to stop smoking, but you were not able to stop. · You smoke more than 5 cigarettes a day. · You want to increase your chances of quitting smoking. · You want to reduce your cravings and withdrawal symptoms. · You feel the benefits of medicine outweigh the side effects. Why might you choose not to use medicine? · You want to try quitting on your own by stopping all at once (\"cold turkey\"). · You want to cut back slowly on the number of cigarettes you smoke. · You smoke fewer than 5 cigarettes a day. · You do not like using medicine. · You feel the side effects of medicines outweigh the benefits. · You are worried about the cost of medicines. Your decision Thinking about the facts and your feelings can help you make a decision that is right for you. Be sure you understand the benefits and risks of your options, and think about what else you need to do before you make the decision. Where can you learn more? Go to http://sheree-frank.info/. Enter C780 in the search box to learn more about \"Deciding About Using Medicines To Quit Smoking. \" Current as of: November 29, 2017 Content Version: 11.7 © 7686-5736 [employer]. Care instructions adapted under license by [employer] (which disclaims liability or warranty for this information). If you have questions about a medical condition or this instruction, always ask your healthcare professional. [address] any warranty or liability for your use of this information. Introducing [facility]! Alda Rios introduces [employer] patient portal. Now you can access parts of your medical record, email your doctor's office, and request medication refills online. 1. In your internet browser, go to [email or web address] 2. Click on the First Time User? Click Here link in the Sign In box. You will see the New Member Sign Up page. 3. Enter your [employer] Access Code exactly as it appears below. You will not need to use this code after youve completed the sign-up process. If you do not sign up before the expiration date, you must request a new code. · [employer] Access Code: [email or web address] Expires: 11/12/2018 10:40 AM 
 
4. Enter the last four digits of your Social Security Number (xxxx) and Date of Birth (mm/dd/yyyy) as indicated and click Submit. You will be taken to the next sign-up page. 5. Create a [employer] ID. This will be your [employer] login ID and cannot be changed, so think of one that is secure and easy to remember. 6. Create a [employer] password. You can change your password at any time. 7. Enter your Password Reset Question and Answer. This can be used at a later time if you forget your password. 8. Enter your e-mail address. You will receive e-mail notification when new information is available in [address] 9. Click Sign Up. You can now view and download portions of your medical record. 10. Click the Download Summary menu link to download a portable copy of your medical information. If you have questions, please visit the Frequently Asked Questions section of the [employer] website. Remember, [employer] is NOT to be used for urgent needs. For medical emergencies, dial 911. Now available from your iPhone and Android! Please provide this summary of care documentation to your next provider. Your primary care clinician is listed as Leah Sewell. If you have any questions after today's visit, please call [phone].

## 2018-08-14 NOTE — PROGRESS NOTES
Subjective:     Jami Alves is a 27 y.o. female seen for follow-up of diabetes. She has had hypoglycemic attacks. .no  Blood sugar control has been better 100's  She has diabetes and hypertension. Jami Alves has the additional concern of boil x 5-6 days      Diabetic Review of Systems: no polyuria or polydipsia, no chest pain, dyspnea or TIA's, no numbness, tingling or pain in extremities. Allergies   Allergen Reactions    Mirapex [Pramipexole] Drowsiness    Morphine Shortness of Breath and Rash    Tramadol Other (comments)     headache       Diet and Lifestyle: follows a diabetic diet regularly, smoker 5/day.     Patient Active Problem List    Diagnosis Date Noted    Obesity, morbid ([facility] [address] 03/26/2018    Back pain in pregnancy 01/30/2017     Class: Present on Admission    Hyperglycemia due to type 2 diabetes mellitus ([facility] 03/23/2016    Hypertension 03/23/2016    Asthma 03/23/2016     Allergies   Allergen Reactions    Mirapex [Pramipexole] Drowsiness    Morphine Shortness of Breath and Rash    Tramadol Other (comments)     headache     Social History   Substance Use Topics    Smoking status: Current Every Day Smoker     Packs/day: 0.33     Years: 15.00     Types: Cigarettes     Start date: 3/23/2004     Last attempt to quit: 1/1/2017    Smokeless tobacco: Never Used    Alcohol use No        Lab Results  Component Value Date/Time   WBC 10.2 08/14/2018 11:45 AM   HGB 13.0 08/14/2018 11:45 AM   HCT 40.9 08/14/2018 11:45 AM   PLATELET 111 32/41/1523 11:45 AM   MCV 75 (L) 08/14/2018 11:45 AM     Lab Results  Component Value Date/Time   Hemoglobin A1c 13.5 (H) 08/14/2018 11:45 AM   Hemoglobin A1c 8.1 (H) 08/19/2016 10:54 AM   Hemoglobin A1c 10.7 (H) 05/10/2016 08:46 AM   Hemoglobin A1c, External 11.4 03/22/2018   Hemoglobin A1c, External 13.5 04/10/2015   Glucose 277 (H) 08/14/2018 11:45 AM   Glucose (POC) 109 (H) 03/16/2017 07:23 AM   Microalb/Creat ratio (ug/mg creat.) 5.7 06/21/2016 08:45 AM Creatinine 0.54 (L) 08/14/2018 11:45 AM      Lab Results  Component Value Date/Time   ALT (SGPT) 58 03/12/2017 05:34 PM   AST (SGOT) 26 03/12/2017 05:34 PM   Alk. phosphatase 132 (H) 03/12/2017 05:34 PM   Bilirubin, total 0.3 03/12/2017 05:34 PM   Albumin 2.5 (L) 03/12/2017 05:34 PM   Protein, total 5.7 (L) 03/12/2017 05:34 PM   INR 0.9 01/05/2017 08:49 PM   Prothrombin time 9.1 01/05/2017 08:49 PM   PLATELET 442 71/45/7695 11:45 AM       Lab Results  Component Value Date/Time   GFR est non- 08/14/2018 11:45 AM   GFR est  08/14/2018 11:45 AM   Creatinine 0.54 (L) 08/14/2018 11:45 AM   BUN 8 08/14/2018 11:45 AM   Sodium 135 08/14/2018 11:45 AM   Potassium 4.7 08/14/2018 11:45 AM   Chloride 95 (L) 08/14/2018 11:45 AM   CO2 22 08/14/2018 11:45 AM   Magnesium 1.8 12/24/2016 08:05 AM     Lab Results   Component Value Date/Time    Glucose 277 (H) 08/14/2018 11:45 AM    Glucose (POC) 109 (H) 03/16/2017 07:23 AM         Review of Systems  Pertinent items are noted in HPI. Objective:     Significant for the following:     Visit Vitals    /85 (BP 1 Location: Left arm, BP Patient Position: Sitting)    Pulse 74    Temp 97.5 °F (36.4 °C) (Oral)    Resp 16    Ht 5' 5\" (1.651 m)    Wt 238 lb (108 kg)    SpO2 98%    BMI 39.61 kg/m2     Appearance: alert, well appearing, and in no distress. Exam: heart sounds normal rate, regular rhythm, normal S1, S2, no murmurs, rubs, clicks or gallops, chest clear  Foot exam: deferred  Boil butt 2 cm SQ, min redness surrounding  Lab review: orders written for new lab studies as appropriate; see orders. Assessment/Plan:     Follow-up diabetes control uncertain, needs further observation, needs to quit smoking. Diabetic issues reviewed with her: annual eye examinations at Ophthalmology discussed, glycohemoglobin and other lab monitoring discussed, long term diabetic complications discussed and patient urged in the strongest terms to quit smoking.      Chronic Conditions Addressed Today     1. Asthma      Acute Diagnoses Addressed Today     Uncontrolled type 2 diabetes mellitus with complication, unspecified whether long term insulin use (HCC)    -  Primary        Relevant Medications        metFORMIN (GLUCOPHAGE) 500 mg tablet        glyBURIDE (DIABETA) 5 mg tablet        Other Relevant Orders        MICROALBUMIN, UR, RAND (Completed)        HEMOGLOBIN A1C WITH EAG (Completed)        METABOLIC PANEL, BASIC (Completed)    Carbuncle of buttock            Relevant Medications        norethindrone-ethinyl estradiol (ORTHO-NOVUM 1-35 TAB, NORTREL 1-35 TAB) 1-35 mg-mcg tab        Other Relevant Orders        DRAIN SKIN ABSCESS COMPLIC    Iron deficiency anemia due to chronic blood loss            Relevant Orders        CBC W/O DIFF (Completed)    Yeast vaginitis        Lymphedema            Relevant Medications        ferrous sulfate 324 mg (65 mg iron) tablet        Other Relevant Orders        AMB SUPPLY ORDER    Family planning            Relevant Orders        AMB POC URINE PREGNANCY TEST, VISUAL COLOR COMPARISON (Completed)    Smoking            Relevant Medications        buPROPion (WELLBUTRIN) 100 mg tablet          ICD-10-CM ICD-9-CM    1. Uncontrolled type 2 diabetes mellitus with complication, unspecified whether long term insulin use (HCC) E11.8 250.92 MICROALBUMIN, UR, RAND    E11.65  HEMOGLOBIN A1C WITH EAG      METABOLIC PANEL, BASIC   2. Carbuncle of buttock L02.33 680.5 DRAIN SKIN ABSCESS COMPLIC   3. Iron deficiency anemia due to chronic blood loss D50.0 280.0 CBC W/O DIFF   4. Mild intermittent asthma without complication H09.00 688.57    5. Yeast vaginitis B37.3 112.1    6. Lymphedema I89.0 457.1 AMB SUPPLY ORDER   7. Family planning Z30.09 V25.09 AMB POC URINE PREGNANCY TEST, VISUAL COLOR COMPARISON   8.  Smoking F17.200 305.1      Orders Placed This Encounter    DRAIN SKIN ABSCESS COMPLIC    AMB SUPPLY ORDER     Knee high med compression hose    MICROALBUMIN, UR, RAND    CBC W/O DIFF    HEMOGLOBIN A1C WITH EAG    METABOLIC PANEL, BASIC    AMB POC URINE PREGNANCY TEST, VISUAL COLOR COMPARISON    fluconazole (DIFLUCAN) 150 mg tablet     Sig: Take 1 Tab by mouth daily for 1 day. FDA advises cautious prescribing of oral fluconazole in pregnancy. Dispense:  1 Tab     Refill:  0    metFORMIN (GLUCOPHAGE) 500 mg tablet     Sig: Take 2 Tabs by mouth two (2) times daily (with meals). Dispense:  120 Tab     Refill:  11    glyBURIDE (DIABETA) 5 mg tablet     Sig: Take 1 Tab by mouth two (2) times daily (with meals). Dispense:  60 Tab     Refill:  11    ferrous sulfate 324 mg (65 mg iron) tablet     Sig: Take 1 Tab by mouth three (3) times daily (with meals). Dispense:  100 Tab     Refill:  5    ibuprofen (MOTRIN) 800 mg tablet     Sig: Take 1 Tab by mouth every eight (8) hours as needed for Pain. Dispense:  90 Tab     Refill:  5    buPROPion (WELLBUTRIN) 100 mg tablet     Sig: Take 1 Tab by mouth two (2) times a day. Start 1 daily then increase then quit     Dispense:  60 Tab     Refill:  5    norethindrone-ethinyl estradiol (ORTHO-NOVUM 1-35 TAB, NORTREL 1-35 TAB) 1-35 mg-mcg tab     Sig: Take 1 Tab by mouth daily. Dispense:  1 Dose Pack     Refill:  5     Options discussed. Discussed possible side affects and benefits of the procedure and/or medications. The patient agrees to undergo the procedure. Consent obtained. Sterile procedure followed. There were no complications and the procedure was well tolerated. Instructed patient to call me if it is ineffective or if there are any complications like increasing pain, redness or swelling. The area was cleaned with alcohol. It was numbed with  ethylchloride. A incision was made over the area. It drained pus  yes, serosanguinous fluid , yes . A pressure dressing was applied. The boil was packed  no. A wound culture was done  no. Anibiotics were given  no.  The procedure was well tolerated. Current Outpatient Prescriptions   Medication Sig Dispense Refill    metFORMIN (GLUCOPHAGE) 500 mg tablet Take 2 Tabs by mouth two (2) times daily (with meals). 120 Tab 11    glyBURIDE (DIABETA) 5 mg tablet Take 1 Tab by mouth two (2) times daily (with meals). 60 Tab 11    ferrous sulfate 324 mg (65 mg iron) tablet Take 1 Tab by mouth three (3) times daily (with meals). 100 Tab 5    ibuprofen (MOTRIN) 800 mg tablet Take 1 Tab by mouth every eight (8) hours as needed for Pain. 90 Tab 5    buPROPion (WELLBUTRIN) 100 mg tablet Take 1 Tab by mouth two (2) times a day. Start 1 daily then increase then quit 60 Tab 5    norethindrone-ethinyl estradiol (ORTHO-NOVUM 1-35 TAB, NORTREL 1-35 TAB) 1-35 mg-mcg tab Take 1 Tab by mouth daily. 1 Dose Pack 5    albuterol (PROAIR HFA) 90 mcg/actuation inhaler Take 2 Puffs by inhalation every four (4) hours as needed for Wheezing. 1 Inhaler 5    albuterol (PROVENTIL VENTOLIN) 2.5 mg /3 mL (0.083 %) nebulizer solution 3 mL by Nebulization route every four (4) hours as needed for Wheezing.  100 Each 5    Blood-Glucose Meter monitoring kit Use up to three times per days 1 Kit 0       Follow-up Disposition:  Return in about 2 months (around 10/14/2018) for full physical.

## 2018-08-14 NOTE — PATIENT INSTRUCTIONS
Combination Birth Control Pills: Care Instructions  Your Care Instructions    Combination birth control pills are used to prevent pregnancy. They give you a regular dose of the hormones estrogen and progestin. You take a hormone pill every day to prevent pregnancy. Birth control pills come in packs. The most common type has 3 weeks of hormone pills. Some packs have sugar pills (they do not contain any hormones) for the fourth week. During that fourth no-hormone week, you have your period. After the fourth week (28 days), you start a new pack. Some birth control pills are packaged in different ways. For example, some have hormone pills for the fourth week instead of sugar pills. Taking hormones for the entire month causes you to not have periods or to have fewer periods. Others are packaged so that you have a period every 3 months. Your doctor will tell you what type of pills you have. Follow-up care is a key part of your treatment and safety. Be sure to make and go to all appointments, and call your doctor if you are having problems. It's also a good idea to know your test results and keep a list of the medicines you take. How can you care for yourself at home? How do you take the pill? · Follow your doctor's instructions about when to start taking your pills. Use backup birth control, such as a condom, or don't have intercourse for 7 days after you start your pills. · Take your pills every day, at about the same time of day. To help yourself do this, try to take them when you do something else every day, such as brushing your teeth. What if you forget to take a pill? Always read the label for specific instructions, or call your doctor. Here are some basic guidelines:  · If you miss 1 hormone pill, take it as soon as you remember. Ask your doctor if you may need to use a backup birth control method, such as a condom, or not have intercourse.   · If you miss 2 or more hormone pills, take one as soon as you remember you forgot them. Then read the pill label or call your doctor about instructions on how to take your missed pills. Use a backup method of birth control or don't have intercourse for 7 days. Pregnancy is more likely if you miss more than 1 pill. · If you had intercourse, you can use emergency contraception, such as the morning-after pill (Plan B). You can use emergency contraception for up to 5 days after having had intercourse, but it works best if you take it right away. What else do you need to know? · The pill has side effects. ¨ You may have very light or skipped periods. ¨ You may have bleeding between periods (spotting). This usually decreases after 3 to 4 months. ¨ You may have mood changes, less interest in sex, or weight gain. · The pill may reduce acne, heavy bleeding and cramping, and symptoms of premenstrual syndrome. · Check with your doctor before you use any other medicines, including over-the-counter medicines, vitamins, herbal products, and supplements. Birth control hormones may not work as well to prevent pregnancy when combined with other medicines. · The pill doesn't protect against sexually transmitted infection (STIs), such as herpes or HIV/AIDS. If you're not sure whether your sex partner might have an STI, use a condom to protect against disease. When should you call for help? Call your doctor now or seek immediate medical care if:    · You have severe belly pain.     · You have signs of a blood clot, such as:  ¨ Pain in your calf, back of the knee, thigh, or groin.   ¨ Redness and swelling in your leg or groin.     · You have blurred vision or other problems seeing.     · You have a severe headache.     · You have severe trouble breathing.    Watch closely for changes in your health, and be sure to contact your doctor if:    · You think you might be pregnant.     · You think you may be depressed.     · You think you may have been exposed to or have a sexually transmitted infection. Where can you learn more? Go to http://sheree-frank.info/. Enter W067 in the search box to learn more about \"Combination Birth Control Pills: Care Instructions. \"  Current as of: November 21, 2017  Content Version: 11.7  © 7342-7366 [employer]. Care instructions adapted under license by [employer] (which disclaims liability or warranty for this information). If you have questions about a medical condition or this instruction, always ask your healthcare professional. [address] any warranty or liability for your use of this information. Deciding About Using Medicines To Quit Smoking  Deciding About Using Medicines To Quit Smoking  What are the medicines you can use? Your doctor may prescribe varenicline (Chantix) or bupropion (Zyban). These medicines can help you cope with cravings for tobacco. They are pills that don't contain nicotine. You also can use nicotine replacement products. These do contain nicotine. There are many types. · Gum and lozenges slowly release nicotine into your mouth. · Patches stick to your skin. They slowly release nicotine into your bloodstream.  · An inhaler has a galindo that contains nicotine. You breathe in a puff of nicotine vapor through your mouth and throat. · Nasal spray releases a mist that contains nicotine. What are key points about this decision? · Using medicines can double your chances of quitting smoking. They can ease cravings and withdrawal symptoms. · Getting counseling along with using medicine can raise your chances of quitting even more. · If you smoke fewer than 5 cigarettes a day, you may not need medicines to help you quit smoking. · These medicines have less nicotine than cigarettes. And by itself, nicotine is not nearly as harmful as smoking. The tars, carbon monoxide, and other toxic chemicals in tobacco cause the harmful effects.   · The side effects of nicotine replacement products depend on the type of product. For example, a patch can make your skin red and itchy. Medicines in pill form can make you sick to your stomach. They can also cause dry mouth and trouble sleeping. For most people, the side effects are not bad enough to make them stop using the products. Why might you choose to use medicines to quit smoking? · You have tried on your own to stop smoking, but you were not able to stop. · You smoke more than 5 cigarettes a day. · You want to increase your chances of quitting smoking. · You want to reduce your cravings and withdrawal symptoms. · You feel the benefits of medicine outweigh the side effects. Why might you choose not to use medicine? · You want to try quitting on your own by stopping all at once (\"cold turkey\"). · You want to cut back slowly on the number of cigarettes you smoke. · You smoke fewer than 5 cigarettes a day. · You do not like using medicine. · You feel the side effects of medicines outweigh the benefits. · You are worried about the cost of medicines. Your decision  Thinking about the facts and your feelings can help you make a decision that is right for you. Be sure you understand the benefits and risks of your options, and think about what else you need to do before you make the decision. Where can you learn more? Go to http://sheree-frank.info/. Enter D802 in the search box to learn more about \"Deciding About Using Medicines To Quit Smoking. \"  Current as of: November 29, 2017  Content Version: 11.7  © 2909-9384 [employer], [facility]. Care instructions adapted under license by [employer] (which disclaims liability or warranty for this information). If you have questions about a medical condition or this instruction, always ask your healthcare professional. [address] any warranty or liability for your use of this information.

## 2018-08-14 NOTE — PROGRESS NOTES
Chief Complaint   Patient presents with    Vaginal Itching     itching after taking an antib for a cold    Cyst     boil to L/buttock, painful     I have reviewed the patient's medical history in detail and updated the computerized patient record. Health Maintenance reviewed. 1. Have you been to the ER, urgent care clinic since your last visit? Hospitalized since your last visit?no    2. Have you seen or consulted any other health care providers outside of the [address] [place] [address] [place] since your last visit? Include any pap smears or colon screening. No    Encouraged pt to discuss pt's wishes with spouse/partner/family and bring them in the next appt to follow thru with the Advanced Directive    Fall Risk Assessment, last 12 mths 3/5/2018   Able to walk? Yes   Fall in past 12 months? No       PHQ over the last two weeks 8/14/2018   Little interest or pleasure in doing things Several days   Feeling down, depressed, irritable, or hopeless Several days   Total Score PHQ 2 2       Abuse Screening Questionnaire 3/5/2018   Do you ever feel afraid of your partner? N   Are you in a relationship with someone who physically or mentally threatens you? N   Is it safe for you to go home?  Y       ADL Assessment 3/5/2018   Feeding yourself No Help Needed   Getting from bed to chair No Help Needed   Getting dressed No Help Needed   Bathing or showering No Help Needed   Walk across the room (includes cane/walker) No Help Needed   Using the telphone No Help Needed   Taking your medications No Help Needed   Preparing meals No Help Needed   Managing money (expenses/bills) No Help Needed   Moderately strenuous housework (laundry) No Help Needed   Shopping for personal items (toiletries/medicines) No Help Needed   Shopping for groceries No Help Needed   Driving No Help Needed   Climbing a flight of stairs No Help Needed   Getting to places beyond walking distances No Help Needed       [facility]  OFFICE PROCEDURE PROGRESS NOTE        Chart reviewed for the following:   Gumaro ESQUIVEL LPN, have reviewed the History, Physical and updated the Allergic reactions for Arden Grave     TIME OUT performed immediately prior to start of procedure:   Gumaro ESQUIVEL LPN, have performed the following reviews on Mickey Bangura prior to the start of the procedure:            * Patient was identified by name and date of birth   * Agreement on procedure being performed was verified  * Risks and Benefits explained to the patient  By Dr. Kyra Ram  * Procedure site verified and marked as necessary  * Patient was positioned for comfort  * Consent was signed and verified     Time: 11:45AM    Date of procedure: 8/14/2018    Procedure performed by:  Cecil Jang MD    Provider assisted by: Qasim Harman LPN    Patient assisted by: Qasim Harman LPN    How tolerated by patient: Well    Post Procedural Pain Scale: 3/10    Comments: None    Dr. Kyra Ram did the procedure

## 2018-08-15 LAB
BUN SERPL-MCNC: 8 MG/DL (ref 6–20)
BUN/CREAT SERPL: 15 (ref 9–23)
CALCIUM SERPL-MCNC: 9.4 MG/DL (ref 8.7–10.2)
CHLORIDE SERPL-SCNC: 95 MMOL/L (ref 96–106)
CO2 SERPL-SCNC: 22 MMOL/L (ref 20–29)
CREAT SERPL-MCNC: 0.54 MG/DL (ref 0.57–1)
ERYTHROCYTE [DISTWIDTH] IN BLOOD BY AUTOMATED COUNT: 16.2 % (ref 12.3–15.4)
EST. AVERAGE GLUCOSE BLD GHB EST-MCNC: 341 MG/DL
GLUCOSE SERPL-MCNC: 277 MG/DL (ref 65–99)
HBA1C MFR BLD: 13.5 % (ref 4.8–5.6)
HCT VFR BLD AUTO: 40.9 % (ref 34–46.6)
HGB BLD-MCNC: 13 G/DL (ref 11.1–15.9)
MCH RBC QN AUTO: 23.8 PG (ref 26.6–33)
MCHC RBC AUTO-ENTMCNC: 31.8 G/DL (ref 31.5–35.7)
MCV RBC AUTO: 75 FL (ref 79–97)
MICROALBUMIN UR-MCNC: 4 UG/ML
PLATELET # BLD AUTO: 237 X10E3/UL (ref 150–379)
POTASSIUM SERPL-SCNC: 4.7 MMOL/L (ref 3.5–5.2)
RBC # BLD AUTO: 5.46 X10E6/UL (ref 3.77–5.28)
SODIUM SERPL-SCNC: 135 MMOL/L (ref 134–144)
WBC # BLD AUTO: 10.2 X10E3/UL (ref 3.4–10.8)

## 2018-08-20 ENCOUNTER — TELEPHONE (OUTPATIENT)
Dept: INTERNAL MEDICINE CLINIC | Age: 33
End: 2018-08-20

## 2018-08-22 ENCOUNTER — TELEPHONE (OUTPATIENT)
Dept: INTERNAL MEDICINE CLINIC | Age: 33
End: 2018-08-22

## 2018-08-23 ENCOUNTER — TELEPHONE (OUTPATIENT)
Dept: INTERNAL MEDICINE CLINIC | Age: 33
End: 2018-08-23

## 2018-08-25 NOTE — PROGRESS NOTES
Send results letter as below:  Your recent lab showed the following abnomality elevated A1C of .13.5. Control of diabetes has worsened. We need you to return to re-check the laboratory value again in 3 months. Please call to schedule an appointment so that we can improve your diabetes. Michelle Newby

## 2018-10-18 ENCOUNTER — OFFICE VISIT (OUTPATIENT)
Dept: INTERNAL MEDICINE CLINIC | Age: 33
End: 2018-10-18

## 2018-10-18 VITALS
HEIGHT: 65 IN | WEIGHT: 235 LBS | TEMPERATURE: 95.5 F | DIASTOLIC BLOOD PRESSURE: 90 MMHG | SYSTOLIC BLOOD PRESSURE: 132 MMHG | RESPIRATION RATE: 16 BRPM | OXYGEN SATURATION: 96 % | HEART RATE: 107 BPM | BODY MASS INDEX: 39.15 KG/M2

## 2018-10-18 DIAGNOSIS — K04.7 DENTAL ABSCESS: ICD-10-CM

## 2018-10-18 DIAGNOSIS — I10 ESSENTIAL HYPERTENSION: ICD-10-CM

## 2018-10-18 DIAGNOSIS — S93.402D SPRAIN OF LEFT ANKLE, UNSPECIFIED LIGAMENT, SUBSEQUENT ENCOUNTER: ICD-10-CM

## 2018-10-18 DIAGNOSIS — E11.65 TYPE 2 DIABETES MELLITUS WITH HYPERGLYCEMIA, WITHOUT LONG-TERM CURRENT USE OF INSULIN (HCC): Primary | ICD-10-CM

## 2018-10-18 RX ORDER — HYDROCODONE BITARTRATE AND ACETAMINOPHEN 5; 325 MG/1; MG/1
1 TABLET ORAL
Qty: 20 TAB | Refills: 0 | Status: SHIPPED | OUTPATIENT
Start: 2018-10-18 | End: 2019-08-19

## 2018-10-18 RX ORDER — MELOXICAM 15 MG/1
15 TABLET ORAL DAILY
COMMUNITY
End: 2019-08-19 | Stop reason: ALTCHOICE

## 2018-10-18 RX ORDER — CLINDAMYCIN HYDROCHLORIDE 300 MG/1
300 CAPSULE ORAL 3 TIMES DAILY
Qty: 30 CAP | Refills: 0 | Status: SHIPPED | OUTPATIENT
Start: 2018-10-18 | End: 2018-10-28

## 2018-10-18 RX ORDER — FLUCONAZOLE 150 MG/1
150 TABLET ORAL DAILY
Qty: 1 TAB | Refills: 1 | Status: SHIPPED | OUTPATIENT
Start: 2018-10-18 | End: 2018-10-19

## 2018-10-18 NOTE — PROGRESS NOTES
Subjective:     Yvonne Thompson is a 26 y.o. female seen for follow-up of diabetes. She has had hypoglycemic attacks. .no  Blood sugar control has been better, following diet  She has diabetes. Yvonne Thompson has the additional concern of tooth hurts finished PCN given by ER. Also injured ankle and is seeing DrWind for that. Has a dentist needs to schedule appt. Diabetic Review of Systems: no polyuria or polydipsia, no chest pain, dyspnea or TIA's, no numbness, tingling or pain in extremities. Allergies   Allergen Reactions    Mirapex [Pramipexole] Drowsiness    Morphine Shortness of Breath and Rash    Tramadol Other (comments)     headache       Diet and Lifestyle: smoker 5 cigs daily.     Patient Active Problem List    Diagnosis Date Noted    Obesity, morbid ([facility] [address] 2018    Back pain in pregnancy 2017     Class: Present on Admission    Hyperglycemia due to type 2 diabetes mellitus ([facility] [address] 2016    Hypertension 2016    Asthma 2016       Allergies   Allergen Reactions    Mirapex [Pramipexole] Drowsiness    Morphine Shortness of Breath and Rash    Tramadol Other (comments)     headache     Social History     Tobacco Use    Smoking status: Current Every Day Smoker     Packs/day: 0.15     Years: 15.00     Pack years: 2.25     Types: Cigarettes     Start date: 3/23/2004     Last attempt to quit: 2017     Years since quittin.7    Smokeless tobacco: Never Used   Substance Use Topics    Alcohol use: No     Alcohol/week: 0.0 oz        Lab Results   Component Value Date/Time    Hemoglobin A1c 13.5 (H) 2018 11:45 AM    Hemoglobin A1c 8.1 (H) 2016 10:54 AM    Hemoglobin A1c 10.7 (H) 05/10/2016 08:46 AM    Hemoglobin A1c, External 11.4 2018    Hemoglobin A1c, External 13.5 04/10/2015    Glucose 277 (H) 2018 11:45 AM    Glucose (POC) 109 (H) 2017 07:23 AM    Microalb/Creat ratio (ug/mg creat.) 5.7 2016 08:45 AM    Creatinine 0.54 (L) 08/14/2018 11:45 AM      Lab Results   Component Value Date/Time    ALT (SGPT) 58 03/12/2017 05:34 PM    AST (SGOT) 26 03/12/2017 05:34 PM    Alk. phosphatase 132 (H) 03/12/2017 05:34 PM    Bilirubin, total 0.3 03/12/2017 05:34 PM    Albumin 2.5 (L) 03/12/2017 05:34 PM    Protein, total 5.7 (L) 03/12/2017 05:34 PM    INR 0.9 01/05/2017 08:49 PM    Prothrombin time 9.1 01/05/2017 08:49 PM    PLATELET 610 94/22/7783 11:45 AM     Lab Results   Component Value Date/Time    GFR est non- 08/14/2018 11:45 AM    GFR est  08/14/2018 11:45 AM    Creatinine 0.54 (L) 08/14/2018 11:45 AM    BUN 8 08/14/2018 11:45 AM    Sodium 135 08/14/2018 11:45 AM    Potassium 4.7 08/14/2018 11:45 AM    Chloride 95 (L) 08/14/2018 11:45 AM    CO2 22 08/14/2018 11:45 AM    Magnesium 1.8 12/24/2016 08:05 AM     Lab Results   Component Value Date/Time    Glucose 277 (H) 08/14/2018 11:45 AM    Glucose (POC) 109 (H) 03/16/2017 07:23 AM         Review of Systems  Pertinent items are noted in HPI. Objective:     Significant for the following:     Visit Vitals  /90 (BP 1 Location: Left arm, BP Patient Position: Sitting)   Pulse (!) 107   Temp 95.5 °F (35.3 °C) (Oral)   Resp 16   Ht 5' 5\" (1.651 m)   Wt 235 lb (106.6 kg)   SpO2 96%   BMI 39.11 kg/m²     Appearance: alert, well appearing, and in no distress and overweight. Exam: heart sounds normal rate, regular rhythm, normal S1, S2, no murmurs, rubs, clicks or gallops, chest clear, factial swelling on the left, dental carries upper very decayed  Foot exam: deferred    Lab review: labs reviewed, I note that glycosylated hemoglobin high at13. Assessment/Plan:     Follow-up diabetes poorly controlled. Diabetic issues reviewed with her: all medications, side effects and compliance discussed carefully and glycohemoglobin and other lab monitoring discussed. Chronic Conditions Addressed Today     1. Hyperglycemia due to type 2 diabetes mellitus ([facility] [address] - Primary    2.  Hypertension Acute Diagnoses Addressed Today     Dental abscess            Relevant Medications        clindamycin (CLEOCIN) 300 mg capsule        HYDROcodone-acetaminophen (NORCO) 5-325 mg per tablet        fluconazole (DIFLUCAN) 150 mg tablet    Sprain of left ankle, unspecified ligament, subsequent encounter            Needs to schedule appt with dentist  Chronic Conditions Addressed Today     1. Hyperglycemia due to type 2 diabetes mellitus ([facility] [address] - Primary    2. Hypertension      Acute Diagnoses Addressed Today     Dental abscess            Relevant Medications        clindamycin (CLEOCIN) 300 mg capsule        HYDROcodone-acetaminophen (NORCO) 5-325 mg per tablet        fluconazole (DIFLUCAN) 150 mg tablet    Sprain of left ankle, unspecified ligament, subsequent encounter            Orders Placed This Encounter    meloxicam (MOBIC) 15 mg tablet     Sig: Take 15 mg by mouth daily.  clindamycin (CLEOCIN) 300 mg capsule     Sig: Take 1 Cap by mouth three (3) times daily for 10 days. Dispense:  30 Cap     Refill:  0    HYDROcodone-acetaminophen (NORCO) 5-325 mg per tablet     Sig: Take 1 Tab by mouth every six (6) hours as needed for Pain. Max Daily Amount: 4 Tabs. Dispense:  20 Tab     Refill:  0    fluconazole (DIFLUCAN) 150 mg tablet     Sig: Take 1 Tab by mouth daily for 1 day. Dispense:  1 Tab     Refill:  1     Discussed possible side affects, precautions, and drug interactions and possible benefits of the medication(s). Follow-up Disposition:  Return in about 5 weeks (around 11/22/2018).

## 2018-10-18 NOTE — PROGRESS NOTES
Chief Complaint   Patient presents with    Dental Pain     L/side of pt's face swollen, painful and going up into L/eye    Leg Pain     L/leg pain   both leg give out     Health Maintenance reviewed. 1. Have you been to the ER, urgent care clinic since your last visit? Hospitalized since your last visit?no    2. Have you seen or consulted any other health care providers outside of the [address] [place] [address] since your last visit? Include any pap smears or colon screening. No      Encouraged pt to discuss pt's wishes with spouse/partner/family and bring them in the next appt to follow thru with the Advanced Directive    Fall Risk Assessment, last 12 mths 3/5/2018   Able to walk? Yes   Fall in past 12 months? No       PHQ over the last two weeks 10/18/2018   Little interest or pleasure in doing things Nearly every day   Feeling down, depressed, irritable, or hopeless Nearly every day   Total Score PHQ 2 6       Abuse Screening Questionnaire 3/5/2018   Do you ever feel afraid of your partner? N   Are you in a relationship with someone who physically or mentally threatens you? N   Is it safe for you to go home?  Y       ADL Assessment 3/5/2018   Feeding yourself No Help Needed   Getting from bed to chair No Help Needed   Getting dressed No Help Needed   Bathing or showering No Help Needed   Walk across the room (includes cane/walker) No Help Needed   Using the telphone No Help Needed   Taking your medications No Help Needed   Preparing meals No Help Needed   Managing money (expenses/bills) No Help Needed   Moderately strenuous housework (laundry) No Help Needed   Shopping for personal items (toiletries/medicines) No Help Needed   Shopping for groceries No Help Needed   Driving No Help Needed   Climbing a flight of stairs No Help Needed   Getting to places beyond walking distances No Help Needed

## 2019-01-07 ENCOUNTER — OFFICE VISIT (OUTPATIENT)
Dept: INTERNAL MEDICINE CLINIC | Age: 34
End: 2019-01-07

## 2019-01-07 VITALS
WEIGHT: 241.2 LBS | RESPIRATION RATE: 16 BRPM | OXYGEN SATURATION: 98 % | HEIGHT: 65 IN | DIASTOLIC BLOOD PRESSURE: 86 MMHG | HEART RATE: 85 BPM | BODY MASS INDEX: 40.19 KG/M2 | SYSTOLIC BLOOD PRESSURE: 131 MMHG | TEMPERATURE: 97.8 F

## 2019-01-07 DIAGNOSIS — E11.65 TYPE 2 DIABETES MELLITUS WITH HYPERGLYCEMIA, WITHOUT LONG-TERM CURRENT USE OF INSULIN (HCC): ICD-10-CM

## 2019-01-07 DIAGNOSIS — I10 ESSENTIAL HYPERTENSION: ICD-10-CM

## 2019-01-07 DIAGNOSIS — B37.31 YEAST VAGINITIS: ICD-10-CM

## 2019-01-07 DIAGNOSIS — M16.12 ARTHRITIS OF LEFT HIP: Primary | ICD-10-CM

## 2019-01-07 LAB
BILIRUB UR QL STRIP: NEGATIVE
GLUCOSE POC: 407 MG/DL
GLUCOSE UR-MCNC: NORMAL MG/DL
KETONES P FAST UR STRIP-MCNC: NEGATIVE MG/DL
PH UR STRIP: 6 [PH] (ref 4.6–8)
PROT UR QL STRIP: NEGATIVE
SP GR UR STRIP: 1.01 (ref 1–1.03)
UA UROBILINOGEN AMB POC: NORMAL (ref 0.2–1)
URINALYSIS CLARITY POC: CLEAR
URINALYSIS COLOR POC: YELLOW
URINE BLOOD POC: NEGATIVE
URINE LEUKOCYTES POC: NEGATIVE
URINE NITRITES POC: NEGATIVE

## 2019-01-08 ENCOUNTER — TELEPHONE (OUTPATIENT)
Dept: INTERNAL MEDICINE CLINIC | Age: 34
End: 2019-01-08

## 2019-01-08 DIAGNOSIS — M25.562 ACUTE PAIN OF LEFT KNEE: Primary | ICD-10-CM

## 2019-01-08 NOTE — TELEPHONE ENCOUNTER
Pt called in reference to wanting to know the medications she was given yesterday and also inquiring about her referral. When she was here the computers were down. Please [place] at [phone]. She needs this information for her disability.

## 2019-01-08 NOTE — TELEPHONE ENCOUNTER
Called patient - she states that while farhad was trying to get into her car her knee (left) gave out and she almost fell - she did catch herself but states that the pain in her left knee is at a 7 right now on a scale of 1-10 - she is asking if Dr Galina Abdullahi can recommend or order her a type of brace in order to protect her knee  Marixa Hankins LPN  4/7/1006  16:67 AM

## 2019-01-08 NOTE — TELEPHONE ENCOUNTER
Pt called back in reference to almost falling while getting out the car. She said that she had told Dr. Galina Abdullahi yesterday that she needs something to support her knee. Please call her at [phone].

## 2019-01-09 NOTE — TELEPHONE ENCOUNTER
Notified patient that Dr Sergio Villagomez has ordered a hinged left knee brace for her - she can  the prescription from our - patient states she also has an appt with orthopedic doctor on Friday, so she will pick it up on that day - suggested that she can take the prescription to a [facility] and talk with the pharmacist  Mark Delgado LPN  9/9/4237  2:04 PM

## 2019-01-10 RX ORDER — FLUCONAZOLE 150 MG/1
150 TABLET ORAL DAILY
Qty: 1 TAB | Refills: 1 | Status: SHIPPED | OUTPATIENT
Start: 2019-01-10 | End: 2019-01-11

## 2019-01-10 RX ORDER — DICLOFENAC SODIUM 10 MG/G
2 GEL TOPICAL 4 TIMES DAILY
Qty: 1 EACH | Refills: 4
Start: 2019-01-10 | End: 2019-08-19 | Stop reason: SDUPTHER

## 2019-01-10 RX ORDER — METFORMIN HYDROCHLORIDE 1000 MG/1
1000 TABLET ORAL 2 TIMES DAILY WITH MEALS
Qty: 60 TAB | Refills: 4
Start: 2019-01-10 | End: 2019-04-05 | Stop reason: SINTOL

## 2019-01-10 RX ORDER — PIOGLITAZONEHYDROCHLORIDE 15 MG/1
15 TABLET ORAL DAILY
Qty: 30 TAB | Refills: 4
Start: 2019-01-10 | End: 2019-04-05

## 2019-02-04 RX ORDER — ALBUTEROL SULFATE 90 UG/1
AEROSOL, METERED RESPIRATORY (INHALATION)
Qty: 1 INHALER | Refills: 5 | Status: SHIPPED | OUTPATIENT
Start: 2019-02-04 | End: 2019-04-03 | Stop reason: SDUPTHER

## 2019-02-04 NOTE — TELEPHONE ENCOUNTER
Pt called in reference to her inhaler. She said that it needs to be renewed but the pharmacy said that her ins will only pay for the generic brand. Please call her at [phone].

## 2019-02-04 NOTE — TELEPHONE ENCOUNTER
Last office visit:  1/7/19  Last filled:  Ventolin Inh 3/5/18 2 puffs every 4 hours PRN #1 inh X 5 refills    No changes  No follow up scheduled

## 2019-04-03 ENCOUNTER — OFFICE VISIT (OUTPATIENT)
Dept: INTERNAL MEDICINE CLINIC | Age: 34
End: 2019-04-03

## 2019-04-03 VITALS
RESPIRATION RATE: 16 BRPM | OXYGEN SATURATION: 98 % | TEMPERATURE: 96 F | BODY MASS INDEX: 40.48 KG/M2 | HEIGHT: 65 IN | DIASTOLIC BLOOD PRESSURE: 80 MMHG | HEART RATE: 99 BPM | WEIGHT: 243 LBS | SYSTOLIC BLOOD PRESSURE: 121 MMHG

## 2019-04-03 DIAGNOSIS — E11.65 TYPE 2 DIABETES MELLITUS WITH HYPERGLYCEMIA, WITHOUT LONG-TERM CURRENT USE OF INSULIN (HCC): ICD-10-CM

## 2019-04-03 DIAGNOSIS — I10 ESSENTIAL HYPERTENSION: ICD-10-CM

## 2019-04-03 DIAGNOSIS — E66.01 OBESITY, MORBID (HCC): ICD-10-CM

## 2019-04-03 DIAGNOSIS — Z72.0 TOBACCO ABUSE: ICD-10-CM

## 2019-04-03 DIAGNOSIS — J45.21 INTERMITTENT ASTHMA WITH ACUTE EXACERBATION, UNSPECIFIED ASTHMA SEVERITY: ICD-10-CM

## 2019-04-03 DIAGNOSIS — J45.41 MODERATE PERSISTENT ASTHMA WITH EXACERBATION: Primary | ICD-10-CM

## 2019-04-03 LAB — HBA1C MFR BLD HPLC: 12.8 %

## 2019-04-03 RX ORDER — PREDNISONE 20 MG/1
40 TABLET ORAL
Qty: 10 TAB | Refills: 0 | Status: SHIPPED | OUTPATIENT
Start: 2019-04-03 | End: 2019-04-08

## 2019-04-03 RX ORDER — ALBUTEROL SULFATE 90 UG/1
AEROSOL, METERED RESPIRATORY (INHALATION)
Qty: 1 INHALER | Refills: 5 | Status: SHIPPED | OUTPATIENT
Start: 2019-04-03 | End: 2019-10-07 | Stop reason: SDUPTHER

## 2019-04-03 RX ORDER — BUPROPION HYDROCHLORIDE 100 MG/1
TABLET ORAL
Qty: 90 TAB | Refills: 2 | Status: SHIPPED | OUTPATIENT
Start: 2019-04-03 | End: 2019-08-19 | Stop reason: SINTOL

## 2019-04-03 RX ORDER — ALBUTEROL SULFATE 0.83 MG/ML
2.5 SOLUTION RESPIRATORY (INHALATION)
Qty: 100 EACH | Refills: 5 | Status: SHIPPED | OUTPATIENT
Start: 2019-04-03 | End: 2019-10-07 | Stop reason: SDUPTHER

## 2019-04-03 NOTE — PROGRESS NOTES
Chief Complaint Patient presents with  Cold Symptoms I have reviewed the patient's medical history in detail and updated the computerized patient record. Health Maintenance reviewed. 1. Have you been to the ER, urgent care clinic since your last visit? Hospitalized since your last visit?no 2. Have you seen or consulted any other health care providers outside of the [address] [place] [address] [place] since your last visit? Include any pap smears or colon screening. No 
 
 
Encouraged pt to discuss pt's wishes with spouse/partner/family and bring them in the next appt to follow thru with the Advanced Directive Fall Risk Assessment, last 12 mths 4/3/2019 Able to walk? Yes Fall in past 12 months? No  
 
 
3 most recent PHQ Screens 4/3/2019 Little interest or pleasure in doing things Several days Feeling down, depressed, irritable, or hopeless Several days Total Score PHQ 2 2 Abuse Screening Questionnaire 4/3/2019 Do you ever feel afraid of your partner? Katharine Fabian Are you in a relationship with someone who physically or mentally threatens you? Katharine Fabian Is it safe for you to go home? Y  
 
 

## 2019-04-03 NOTE — PATIENT INSTRUCTIONS
Deciding About Using Medicines To Quit Smoking How can you decide about using medicines to quit smoking? What are the medicines you can use? Your doctor may prescribe varenicline (Chantix) or bupropion (Zyban). These medicines can help you cope with cravings for tobacco. They are pills that don't contain nicotine. You also can use nicotine replacement products. These do contain nicotine. There are many types. · Gum and lozenges slowly release nicotine into your mouth. · Patches stick to your skin. They slowly release nicotine into your bloodstream. 
· An inhaler has a galindo that contains nicotine. You breathe in a puff of nicotine vapor through your mouth and throat. · Nasal spray releases a mist that contains nicotine. What are key points about this decision? · Using medicines can double your chances of quitting smoking. They can ease cravings and withdrawal symptoms. · Getting counseling along with using medicine can raise your chances of quitting even more. · If you smoke fewer than 5 cigarettes a day, you may not need medicines to help you quit smoking. · These medicines have less nicotine than cigarettes. And by itself, nicotine is not nearly as harmful as smoking. The tars, carbon monoxide, and other toxic chemicals in tobacco cause the harmful effects. · The side effects of nicotine replacement products depend on the type of product. For example, a patch can make your skin red and itchy. Medicines in pill form can make you sick to your stomach. They can also cause dry mouth and trouble sleeping. For most people, the side effects are not bad enough to make them stop using the products. Why might you choose to use medicines to quit smoking? · You have tried on your own to stop smoking, but you were not able to stop. · You smoke more than 5 cigarettes a day. · You want to increase your chances of quitting smoking. · You want to reduce your cravings and withdrawal symptoms. · You feel the benefits of medicine outweigh the side effects. Why might you choose not to use medicine? · You want to try quitting on your own by stopping all at once (\"cold turkey\"). · You want to cut back slowly on the number of cigarettes you smoke. · You smoke fewer than 5 cigarettes a day. · You do not like using medicine. · You feel the side effects of medicines outweigh the benefits. · You are worried about the cost of medicines. Your decision Thinking about the facts and your feelings can help you make a decision that is right for you. Be sure you understand the benefits and risks of your options, and think about what else you need to do before you make the decision. Where can you learn more? Go to http://sheree-frank.info/. Enter O420 in the search box to learn more about \"Deciding About Using Medicines To Quit Smoking. \" Current as of: September 26, 2018 Content Version: 11.9 © 5427-4814 [employer]. Care instructions adapted under license by [employer] (which disclaims liability or warranty for this information). If you have questions about a medical condition or this instruction, always ask your healthcare professional. [address] any warranty or liability for your use of this information.

## 2019-04-03 NOTE — PROGRESS NOTES
Subjective:  
Rebekah Zavala is a 26 y.o. female who complains of congestion, post nasal drip and cough described as productive of milky sputum for 5 days, gradually improving since that time. Wheezing some as well She denies a history of fevers and rash on body. Has diabetes has been poorly controlled, last A1c was 13.5. We have done an A1c on her today she is in a rush and has not waited for the result. She thinks her diabetes is better, no hypoglycemia. States takes her diabetes medicines daily, increase metformin gives her diarrhea. Evaluation to date: none. Treatment to date: OTC products. Neb Rx, old Patient does smoke cigarettes. Relevant PMH: Asthma. Allergies Allergen Reactions  Mirapex [Pramipexole] Drowsiness  Morphine Shortness of Breath and Rash  Tramadol Other (comments)  
  headache Patient Active Problem List  
 Diagnosis Date Noted  Back pain in pregnancy 01/30/2017 Priority: 1 - One  
  Class: Present on Admission  Obesity, morbid ([facility] [address] 03/26/2018  Hyperglycemia due to type 2 diabetes mellitus ([facility] [address] 03/23/2016  Hypertension 03/23/2016  Asthma 03/23/2016 Current Outpatient Medications Medication Sig Dispense Refill  VENTOLIN HFA 90 mcg/actuation inhaler TAKE TWO PUFFS BY INHALATION EVERY FOUR HOURS AS NEEDED FOR WHEEZING. 1 Inhaler 5  
 diclofenac (VOLTAREN) 1 % gel Apply 2 g to affected area four (4) times daily. 1 Each 4  
 metFORMIN (GLUCOPHAGE) 1,000 mg tablet Take 1 Tab by mouth two (2) times daily (with meals). 60 Tab 4  pioglitazone (ACTOS) 15 mg tablet Take 1 Tab by mouth daily. 30 Tab 4  
 meloxicam (MOBIC) 15 mg tablet Take 15 mg by mouth daily.  metFORMIN (GLUCOPHAGE) 500 mg tablet Take 2 Tabs by mouth two (2) times daily (with meals). 120 Tab 11  
 glyBURIDE (DIABETA) 5 mg tablet Take 1 Tab by mouth two (2) times daily (with meals).  60 Tab 11  
  ferrous sulfate 324 mg (65 mg iron) tablet Take 1 Tab by mouth three (3) times daily (with meals). 100 Tab 5  
 norethindrone-ethinyl estradiol (ORTHO-NOVUM 1-35 TAB, NORTREL 1-35 TAB) 1-35 mg-mcg tab Take 1 Tab by mouth daily. 1 Dose Pack 5  
 albuterol (PROVENTIL VENTOLIN) 2.5 mg /3 mL (0.083 %) nebulizer solution 3 mL by Nebulization route every four (4) hours as needed for Wheezing. 100 Each 5  Blood-Glucose Meter monitoring kit Use up to three times per days 1 Kit 0  
 HYDROcodone-acetaminophen (NORCO) 5-325 mg per tablet Take 1 Tab by mouth every six (6) hours as needed for Pain. Max Daily Amount: 4 Tabs. 20 Tab 0 Allergies Allergen Reactions  Mirapex [Pramipexole] Drowsiness  Morphine Shortness of Breath and Rash  Tramadol Other (comments)  
  headache Social History Tobacco Use  Smoking status: Current Every Day Smoker Packs/day: 0.15 Years: 15.00 Pack years: 2.25 Types: Cigarettes Start date: 3/23/2004 Last attempt to quit: 2017 Years since quittin.2  Smokeless tobacco: Never Used Substance Use Topics  Alcohol use: No  
  Alcohol/week: 0.0 oz Review of Systems Pertinent items are noted in HPI. Objective:  
 
Visit Vitals /80 (BP 1 Location: Left arm, BP Patient Position: Sitting) Pulse 99 Temp 96 °F (35.6 °C) (Temporal) Resp 16 Ht 5' 5\" (1.651 m) Wt 243 lb (110.2 kg) SpO2 98% BMI 40.44 kg/m² General:  alert, cooperative, no distress Eyes: conjunctivae/corneas clear. PERRL, EOM's intact. Fundi benign Ears: normal TM's and external ear canals AU Sinuses: Normal paranasal sinuses without tenderness Mouth:  Lips, mucosa, and tongue normal. Teeth and gums normal  
Neck: supple, symmetrical, trachea midline and no adenopathy. Heart: S1 and S2 normal, no murmurs noted. Lungs: clear to auscultation bilaterally Abdomen: soft, non-tender. Bowel sounds normal. No masses,  no organomegaly Assessment/Plan:  
viral upper respiratory illness and asthma Suggested symptomatic OTC remedies. RTC prn. Discussed diagnosis and treatment of viral URIs. Discussed the importance of avoiding unnecessary antibiotic therapy. Encounter Diagnoses Name Primary?  Moderate persistent asthma with exacerbation Yes  Type 2 diabetes mellitus with hyperglycemia, without long-term current use of insulin ([address]  Essential hypertension  Tobacco abuse  Obesity, morbid ([facility] [address]  Intermittent asthma with acute exacerbation, unspecified asthma severity Orders Placed This Encounter  AMB POC HEMOGLOBIN A1C  predniSONE (DELTASONE) 20 mg tablet  albuterol (VENTOLIN HFA) 90 mcg/actuation inhaler  albuterol (PROVENTIL VENTOLIN) 2.5 mg /3 mL (0.083 %) nebulizer solution  buPROPion (WELLBUTRIN) 100 mg tablet Lesvia Rutledge Chronic Conditions Addressed Today 1. Obesity, morbid ([address] 2. Hypertension 3. Hyperglycemia due to type 2 diabetes mellitus ([facility] [address] Relevant Orders AMB POC HEMOGLOBIN A1C (Completed) 4. Asthma Relevant Medications  
  predniSONE (DELTASONE) 20 mg tablet  
  albuterol (VENTOLIN HFA) 90 mcg/actuation inhaler  
  albuterol (PROVENTIL VENTOLIN) 2.5 mg /3 mL (0.083 %) nebulizer solution Acute Diagnoses Addressed Today Moderate persistent asthma with exacerbation    -  Primary Relevant Medications  
   
 predniSONE (DELTASONE) 20 mg tablet  
   
 albuterol (VENTOLIN HFA) 90 mcg/actuation inhaler  
   
 albuterol (PROVENTIL VENTOLIN) 2.5 mg /3 mL (0.083 %) nebulizer solution Tobacco abuse Relevant Medications buPROPion (WELLBUTRIN) 100 mg tablet Discussed possible side affects, precautions, and drug interactions and possible benefits of the medication(s). See phone message. Follow-up and Dispositions · Return in about 1 month (around 5/1/2019) for routine follow up.

## 2019-04-05 ENCOUNTER — TELEPHONE (OUTPATIENT)
Dept: INTERNAL MEDICINE CLINIC | Age: 34
End: 2019-04-05

## 2019-04-05 PROBLEM — M54.9 BACK PAIN IN PREGNANCY: Status: RESOLVED | Noted: 2017-01-30 | Resolved: 2019-04-05

## 2019-04-05 PROBLEM — O99.891 BACK PAIN IN PREGNANCY: Status: RESOLVED | Noted: 2017-01-30 | Resolved: 2019-04-05

## 2019-04-05 RX ORDER — METFORMIN HYDROCHLORIDE 500 MG/1
500 TABLET ORAL 2 TIMES DAILY WITH MEALS
Qty: 60 TAB | Refills: 11 | Status: SHIPPED | OUTPATIENT
Start: 2019-04-05 | End: 2020-04-17

## 2019-04-05 RX ORDER — PIOGLITAZONEHYDROCHLORIDE 30 MG/1
30 TABLET ORAL DAILY
Qty: 30 TAB | Refills: 5 | Status: SHIPPED | OUTPATIENT
Start: 2019-04-05 | End: 2019-08-19 | Stop reason: SDUPTHER

## 2019-04-05 NOTE — LETTER
4/5/2019 1:12 PM 
 
Ms. Emili Jacobson [address] [place] [address] Dear Ms. Bangura: 
 
Diabetes is not as good as we like, increase Actos to 30 mg daily like we discussed. Please follow a diabetic diet carefully. Reduce starchy foods and sweet foods. Will need to re-check this levels in a few months. If diabetes worsens will need to increase your medications Sincerely, Julia Hairston MD

## 2019-04-05 NOTE — TELEPHONE ENCOUNTER
Discussed with her, breathing better. Inc metformin inc diarrhea. A1C cont high, inc actose to 30 mg daily. Diabetes is not as good as we like, increase Actos to 30 mg daily like we discussed. Please follow the diabetic diet carefully. Reduce starchy foods and sweet foods. Will need to re-check this levels in a few months. If diabetes worsens will need to increase your medications.

## 2019-08-19 ENCOUNTER — OFFICE VISIT (OUTPATIENT)
Dept: INTERNAL MEDICINE CLINIC | Age: 34
End: 2019-08-19

## 2019-08-19 VITALS
HEART RATE: 76 BPM | OXYGEN SATURATION: 98 % | SYSTOLIC BLOOD PRESSURE: 129 MMHG | RESPIRATION RATE: 16 BRPM | HEIGHT: 65 IN | DIASTOLIC BLOOD PRESSURE: 77 MMHG | WEIGHT: 238 LBS | BODY MASS INDEX: 39.65 KG/M2 | TEMPERATURE: 98.4 F

## 2019-08-19 DIAGNOSIS — E11.65 TYPE 2 DIABETES MELLITUS WITH HYPERGLYCEMIA, WITHOUT LONG-TERM CURRENT USE OF INSULIN (HCC): Primary | ICD-10-CM

## 2019-08-19 DIAGNOSIS — N76.0 ACUTE VAGINITIS: ICD-10-CM

## 2019-08-19 DIAGNOSIS — Z13.220 SCREENING CHOLESTEROL LEVEL: ICD-10-CM

## 2019-08-19 DIAGNOSIS — M77.9 TENDONITIS: ICD-10-CM

## 2019-08-19 RX ORDER — PIOGLITAZONEHYDROCHLORIDE 30 MG/1
30 TABLET ORAL DAILY
Qty: 30 TAB | Refills: 5 | Status: SHIPPED | OUTPATIENT
Start: 2019-08-19

## 2019-08-19 RX ORDER — DICLOFENAC SODIUM 50 MG/1
50 TABLET, DELAYED RELEASE ORAL 2 TIMES DAILY
Qty: 60 TAB | Refills: 2 | Status: SHIPPED | OUTPATIENT
Start: 2019-08-19 | End: 2021-03-22

## 2019-08-19 RX ORDER — FLUCONAZOLE 150 MG/1
150 TABLET ORAL DAILY
Qty: 1 TAB | Refills: 0 | Status: SHIPPED | OUTPATIENT
Start: 2019-08-19 | End: 2019-08-20

## 2019-08-19 RX ORDER — DICLOFENAC SODIUM 10 MG/G
2 GEL TOPICAL 4 TIMES DAILY
Qty: 1 EACH | Refills: 4 | Status: SHIPPED | OUTPATIENT
Start: 2019-08-19 | End: 2019-10-07 | Stop reason: ALTCHOICE

## 2019-08-19 NOTE — PROGRESS NOTES
Chief Complaint   Patient presents with    Diabetes     med evaluation       L/eye crusty, draining white fluid and itching    Vaginal Itching     vaginal itching started yesterday      I have reviewed the patient's medical history in detail and updated the computerized patient record. Health Maintenance reviewed. 1. Have you been to the ER, urgent care clinic since your last visit? Hospitalized since your last visit? yes    2. Have you seen or consulted any other health care providers outside of the [address] [place] [address] [place] since your last visit? Include any pap smears or colon screening. Urgent Care 1 week ago      Encouraged pt to discuss pt's wishes with spouse/partner/family and bring them in the next appt to follow thru with the Advanced Directive    Fall Risk Assessment, last 12 mths 4/3/2019   Able to walk? Yes   Fall in past 12 months? No       3 most recent PHQ Screens 8/19/2019   Little interest or pleasure in doing things Several days   Feeling down, depressed, irritable, or hopeless Several days   Total Score PHQ 2 2       Abuse Screening Questionnaire 4/3/2019   Do you ever feel afraid of your partner? N   Are you in a relationship with someone who physically or mentally threatens you? N   Is it safe for you to go home?  Y       ADL Assessment 4/3/2019   Feeding yourself No Help Needed   Getting from bed to chair No Help Needed   Getting dressed No Help Needed   Bathing or showering No Help Needed   Walk across the room (includes cane/walker) No Help Needed   Using the telphone No Help Needed   Taking your medications No Help Needed   Preparing meals No Help Needed   Managing money (expenses/bills) No Help Needed   Moderately strenuous housework (laundry) No Help Needed   Shopping for personal items (toiletries/medicines) No Help Needed   Shopping for groceries No Help Needed   Driving No Help Needed   Climbing a flight of stairs No Help Needed   Getting to places beyond walking distances No Help Needed

## 2019-08-19 NOTE — PROGRESS NOTES
Subjective:     Shamika Scherer is a 26 y.o. female seen for follow-up of diabetes. She has had hypoglycemic attacks. .no  Blood sugar control has been mainly 200s, last A1C was 12.8 in 2019  She has diabetes. Shamika Scherer has the additional concern of getting over a stomach bug, left eye irritation resolving. Both shoulders hurt x 2 weeks. Gets itching vag area, says not STI      Diabetic Review of Systems: no polyuria or polydipsia, no chest pain, dyspnea or TIA's, no numbness, tingling or pain in extremities. Allergies   Allergen Reactions    Mirapex [Pramipexole] Drowsiness    Morphine Shortness of Breath and Rash    Tramadol Other (comments)     headache       Diet and Lifestyle: follows a diabetic diet regularly, smoker 2-3 cigs daily.   Not toelrated well butrin    Patient Active Problem List    Diagnosis Date Noted    Obesity, morbid ([facility] [address] 2018    Hyperglycemia due to type 2 diabetes mellitus ([facility] [address] 2016    Hypertension 2016    Asthma 2016     Social History     Tobacco Use    Smoking status: Current Every Day Smoker     Packs/day: 0.15     Years: 15.00     Pack years: 2.25     Types: Cigarettes     Start date: 3/23/2004     Last attempt to quit: 2017     Years since quittin.6    Smokeless tobacco: Never Used   Substance Use Topics    Alcohol use: No     Alcohol/week: 0.0 standard drinks        Lab Results   Component Value Date/Time    WBC 10.2 2018 11:45 AM    HGB 13.0 2018 11:45 AM    HCT 40.9 2018 11:45 AM    PLATELET 388 10/18/3986 11:45 AM    MCV 75 (L) 2018 11:45 AM     Lab Results   Component Value Date/Time    Hemoglobin A1c 12.6 (H) 2019 03:15 PM    Hemoglobin A1c 13.5 (H) 2018 11:45 AM    Hemoglobin A1c 8.1 (H) 2016 10:54 AM    Hemoglobin A1c, External 11.4 2018    Hemoglobin A1c, External 13.5 04/10/2015    Glucose 167 (H) 2019 03:15 PM    Glucose (POC) 109 (H) 2017 07:23 AM    Glucose  01/07/2019 08:38 AM    Microalb/Creat ratio (ug/mg creat.) 2.3 08/19/2019 03:15 PM    LDL, calculated 128 (H) 08/19/2019 03:15 PM    Creatinine 0.56 (L) 08/19/2019 03:15 PM      Lab Results   Component Value Date/Time    Cholesterol, total 203 (H) 08/19/2019 03:15 PM    HDL Cholesterol 38 (L) 08/19/2019 03:15 PM    LDL, calculated 128 (H) 08/19/2019 03:15 PM    LDL-C, External 128 04/10/2015    Triglyceride 184 (H) 08/19/2019 03:15 PM     Lab Results   Component Value Date/Time    ALT (SGPT) 35 (H) 08/19/2019 03:15 PM    AST (SGOT) 22 08/19/2019 03:15 PM    Alk. phosphatase 64 08/19/2019 03:15 PM    Bilirubin, total 0.3 08/19/2019 03:15 PM    Albumin 3.9 08/19/2019 03:15 PM    Protein, total 6.9 08/19/2019 03:15 PM    INR 0.9 01/05/2017 08:49 PM    Prothrombin time 9.1 01/05/2017 08:49 PM    PLATELET 120 63/77/6285 11:45 AM     Lab Results   Component Value Date/Time    GFR est non- 08/19/2019 03:15 PM    GFR est  08/19/2019 03:15 PM    Creatinine 0.56 (L) 08/19/2019 03:15 PM    BUN 9 08/19/2019 03:15 PM    Sodium 140 08/19/2019 03:15 PM    Potassium 4.2 08/19/2019 03:15 PM    Chloride 100 08/19/2019 03:15 PM    CO2 24 08/19/2019 03:15 PM    Magnesium 1.8 12/24/2016 08:05 AM     Lab Results   Component Value Date/Time    Glucose 167 (H) 08/19/2019 03:15 PM    Glucose (POC) 109 (H) 03/16/2017 07:23 AM    Glucose  01/07/2019 08:38 AM         Review of Systems  Pertinent items are noted in HPI. Objective:     Significant for the following:     Visit Vitals  /77 (BP 1 Location: Left arm, BP Patient Position: Sitting)   Pulse 76   Temp 98.4 °F (36.9 °C) (Oral)   Resp 16   Ht 5' 5\" (1.651 m)   Wt 238 lb (108 kg)   SpO2 98%   BMI 39.61 kg/m²     Appearance: alert, well appearing, and in no distress. Exam: heart sounds normal rate, regular rhythm, normal S1, S2, no murmurs, rubs, clicks or gallops, chest clear, no hepatosplenomegaly  Foot exam: Diabetic foot exam was performed.   No obvious sores or red lesions. Sensation checked by monofilament exam which was normal.  Dorsalis pedis pulse waspresernt  . Lab review: orders written for new lab studies as appropriate; see orders. Assessment/Plan:     Follow-up diabetes control uncertain, needs further observation, needs to quit smoking, needs to follow diet more regularly. Diabetic issues reviewed with her: all medications, side effects and compliance discussed carefully, foot care discussed and Podiatry visits discussed, glycohemoglobin and other lab monitoring discussed and patient urged in the strongest terms to quit smoking. Chronic Conditions Addressed Today     1.  Hyperglycemia due to type 2 diabetes mellitus ([facility] [address] - Primary     Relevant Medications     glucose blood VI test strips (ASCENSIA AUTODISC VI, ONE TOUCH ULTRA TEST VI) strip     pioglitazone (ACTOS) 30 mg tablet     Other Relevant Orders     MICROALBUMIN, UR, RAND W/ MICROALB/CREAT RATIO (Completed)     HEMOGLOBIN A1C WITH EAG (Completed)     METABOLIC PANEL, COMPREHENSIVE (Completed)     DC HANDLG&/OR CONVEY OF SPEC FOR TR OFFICE TO LAB     COLLECTION VENOUS BLOOD,VENIPUNCTURE      Acute Diagnoses Addressed Today     Acute vaginitis            Relevant Medications        fluconazole (DIFLUCAN) 150 mg tablet        Other Relevant Orders        AMB POC URINALYSIS DIP STICK AUTO W/ MICRO        DC HANDLG&/OR CONVEY OF SPEC FOR TR OFFICE TO LAB        COLLECTION VENOUS BLOOD,VENIPUNCTURE    Screening cholesterol level            Relevant Orders        LIPID PANEL (Completed)        DC HANDLG&/OR CONVEY OF SPEC FOR TR OFFICE TO LAB        COLLECTION VENOUS BLOOD,VENIPUNCTURE    Tendonitis            Relevant Orders        DC HANDLG&/OR CONVEY OF SPEC FOR TR OFFICE TO LAB        COLLECTION VENOUS BLOOD,VENIPUNCTURE        Orders Placed This Encounter    MICROALBUMIN, UR, RAND W/ MICROALB/CREAT RATIO    HEMOGLOBIN A1C WITH EAG    METABOLIC PANEL, COMPREHENSIVE    LIPID PANEL    CVD REPORT    DIABETES PATIENT EDUCATION    DIABETES PATIENT EDUCATION    AMB POC URINALYSIS DIP STICK AUTO W/ MICRO    LA HANDLG&/OR CONVEY OF SPEC FOR TR OFFICE TO LAB    COLLECTION VENOUS BLOOD,VENIPUNCTURE    diclofenac EC (VOLTAREN) 50 mg EC tablet     Sig: Take 1 Tab by mouth two (2) times a day. Dispense:  60 Tab     Refill:  2    fluconazole (DIFLUCAN) 150 mg tablet     Sig: Take 1 Tab by mouth daily for 1 day. Dispense:  1 Tab     Refill:  0    glucose blood VI test strips (ASCENSIA AUTODISC VI, ONE TOUCH ULTRA TEST VI) strip     Sig: May check blood sugar daily, true matrix strips     Dispense:  50 Strip     Refill:  5    pioglitazone (ACTOS) 30 mg tablet     Sig: Take 1 Tab by mouth daily. Dispense:  30 Tab     Refill:  5    diclofenac (VOLTAREN) 1 % gel     Sig: Apply 2 g to affected area four (4) times daily. Dispense:  1 Each     Refill:  4     Pain Rx as above  If fungal Rx not better needs pelvic testing  Dm lab eval      Follow-up and Dispositions    · Return in about 3 months (around 11/19/2019) for routine follow up.

## 2019-08-20 LAB
ALBUMIN SERPL-MCNC: 3.9 G/DL (ref 3.5–5.5)
ALBUMIN/CREAT UR: 2.3 MG/G CREAT (ref 0–30)
ALBUMIN/GLOB SERPL: 1.3 {RATIO} (ref 1.2–2.2)
ALP SERPL-CCNC: 64 IU/L (ref 39–117)
ALT SERPL-CCNC: 35 IU/L (ref 0–32)
AST SERPL-CCNC: 22 IU/L (ref 0–40)
BILIRUB SERPL-MCNC: 0.3 MG/DL (ref 0–1.2)
BUN SERPL-MCNC: 9 MG/DL (ref 6–20)
BUN/CREAT SERPL: 16 (ref 9–23)
CALCIUM SERPL-MCNC: 9 MG/DL (ref 8.7–10.2)
CHLORIDE SERPL-SCNC: 100 MMOL/L (ref 96–106)
CHOLEST SERPL-MCNC: 203 MG/DL (ref 100–199)
CO2 SERPL-SCNC: 24 MMOL/L (ref 20–29)
CREAT SERPL-MCNC: 0.56 MG/DL (ref 0.57–1)
CREAT UR-MCNC: 176.7 MG/DL
EST. AVERAGE GLUCOSE BLD GHB EST-MCNC: 315 MG/DL
GLOBULIN SER CALC-MCNC: 3 G/DL (ref 1.5–4.5)
GLUCOSE SERPL-MCNC: 167 MG/DL (ref 65–99)
HBA1C MFR BLD: 12.6 % (ref 4.8–5.6)
HDLC SERPL-MCNC: 38 MG/DL
INTERPRETATION, 910389: NORMAL
LDLC SERPL CALC-MCNC: 128 MG/DL (ref 0–99)
Lab: NORMAL
Lab: NORMAL
MICROALBUMIN UR-MCNC: 4.1 UG/ML
POTASSIUM SERPL-SCNC: 4.2 MMOL/L (ref 3.5–5.2)
PROT SERPL-MCNC: 6.9 G/DL (ref 6–8.5)
SODIUM SERPL-SCNC: 140 MMOL/L (ref 134–144)
TRIGL SERPL-MCNC: 184 MG/DL (ref 0–149)
VLDLC SERPL CALC-MCNC: 37 MG/DL (ref 5–40)

## 2019-08-21 NOTE — PROGRESS NOTES
Send results letter as below:  Your recent lab showed the following abnomality:   Diabetes is not good, A1C is high at 12. 6. blood sugars run higher than we like, take new medicine regiment ( Actos 30 mg) as we discussed. Please strictly  follow diabetes diet as well. We need you to return to re-check the laboratory value again in 3 months.

## 2019-10-07 ENCOUNTER — OFFICE VISIT (OUTPATIENT)
Dept: INTERNAL MEDICINE CLINIC | Age: 34
End: 2019-10-07

## 2019-10-07 VITALS
HEIGHT: 65 IN | OXYGEN SATURATION: 98 % | TEMPERATURE: 98.6 F | SYSTOLIC BLOOD PRESSURE: 174 MMHG | DIASTOLIC BLOOD PRESSURE: 90 MMHG | RESPIRATION RATE: 24 BRPM | BODY MASS INDEX: 41.99 KG/M2 | HEART RATE: 85 BPM | WEIGHT: 252 LBS

## 2019-10-07 DIAGNOSIS — E11.65 TYPE 2 DIABETES MELLITUS WITH HYPERGLYCEMIA, WITHOUT LONG-TERM CURRENT USE OF INSULIN (HCC): ICD-10-CM

## 2019-10-07 DIAGNOSIS — E66.01 MORBID OBESITY (HCC): ICD-10-CM

## 2019-10-07 DIAGNOSIS — J45.41 MODERATE PERSISTENT ASTHMA WITH EXACERBATION: Primary | ICD-10-CM

## 2019-10-07 DIAGNOSIS — N92.6 IRREGULAR MENSTRUAL CYCLE: ICD-10-CM

## 2019-10-07 LAB
HCG URINE, QL. (POC): NEGATIVE
VALID INTERNAL CONTROL?: YES

## 2019-10-07 RX ORDER — AZITHROMYCIN 250 MG/1
250 TABLET, FILM COATED ORAL SEE ADMIN INSTRUCTIONS
Qty: 6 TAB | Refills: 0 | Status: SHIPPED | OUTPATIENT
Start: 2019-10-07 | End: 2021-03-22

## 2019-10-07 RX ORDER — ALBUTEROL SULFATE 0.83 MG/ML
2.5 SOLUTION RESPIRATORY (INHALATION)
Qty: 100 EACH | Refills: 5 | Status: SHIPPED | OUTPATIENT
Start: 2019-10-07

## 2019-10-07 RX ORDER — ALBUTEROL SULFATE 90 UG/1
AEROSOL, METERED RESPIRATORY (INHALATION)
Qty: 1 INHALER | Refills: 5 | Status: SHIPPED | OUTPATIENT
Start: 2019-10-07

## 2019-10-07 NOTE — PROGRESS NOTES
Subjective:   Geneva Colon is a 26 y.o. female who complains of sneezing, cough described as productive of green sputum and wheezing dyspnea throat itches pain  for 4 days, stable since that time. She denies a history of fevers, nausea, rash on body and vomiting. Trying to quit smoking so gained wt. Has Dm last A1C was 12.6  Evaluation to date: none. Treatment to date: OTC products. Patient does smoke cigarettes. Relevant PMH: Asthma. Allergies   Allergen Reactions    Mirapex [Pramipexole] Drowsiness    Morphine Shortness of Breath and Rash    Tramadol Other (comments)     headache         Patient Active Problem List    Diagnosis Date Noted    Obesity, morbid ([facility] [address] 03/26/2018    Hyperglycemia due to type 2 diabetes mellitus ([facility] [address] 03/23/2016    Hypertension 03/23/2016    Asthma 03/23/2016     Current Outpatient Medications   Medication Sig Dispense Refill    albuterol (VENTOLIN HFA) 90 mcg/actuation inhaler TAKE TWO PUFFS BY INHALATION EVERY FOUR HOURS AS NEEDED FOR WHEEZING. 1 Inhaler 5    albuterol (PROVENTIL VENTOLIN) 2.5 mg /3 mL (0.083 %) nebu 3 mL by Nebulization route every four (4) hours as needed (wheezing). 100 Each 5    azithromycin (ZITHROMAX) 250 mg tablet Take 1 Tab by mouth See Admin Instructions. Take two tablets today then one tablet daily for next 4 days 6 Tab 0    diclofenac EC (VOLTAREN) 50 mg EC tablet Take 1 Tab by mouth two (2) times a day. 60 Tab 2    glucose blood VI test strips (ASCENSIA AUTODISC VI, ONE TOUCH ULTRA TEST VI) strip May check blood sugar daily, true matrix strips 50 Strip 5    pioglitazone (ACTOS) 30 mg tablet Take 1 Tab by mouth daily. 30 Tab 5    metFORMIN (GLUCOPHAGE) 500 mg tablet Take 1 Tab by mouth two (2) times daily (with meals). 60 Tab 11    Blood-Glucose Meter monitoring kit Use up to three times per days 1 Kit 0    norethindrone-ethinyl estradiol (ORTHO-NOVUM 1-35 TAB, NORTREL 1-35 TAB) 1-35 mg-mcg tab Take 1 Tab by mouth daily.  1 Dose Pack 5     Allergies   Allergen Reactions    Mirapex [Pramipexole] Drowsiness    Morphine Shortness of Breath and Rash    Tramadol Other (comments)     headache     Social History     Tobacco Use    Smoking status: Current Every Day Smoker     Packs/day: 0.15     Years: 15.00     Pack years: 2.25     Types: Cigarettes     Start date: 3/23/2004     Last attempt to quit: 2017     Years since quittin.7    Smokeless tobacco: Never Used   Substance Use Topics    Alcohol use: No     Alcohol/week: 0.0 standard drinks        Review of Systems  Pertinent items are noted in HPI. Objective:     Visit Vitals  /90 (BP 1 Location: Left arm, BP Patient Position: At rest)   Pulse 85   Temp 98.6 °F (37 °C) (Oral)   Resp 24   Ht 5' 5\" (1.651 m)   Wt 252 lb (114.3 kg)   LMP 2019 (Approximate)   SpO2 98%   BMI 41.93 kg/m²     General:  alert, cooperative, no distress, coughing   Eyes: negative   Ears: normal TM's and external ear canals AU   Sinuses: Normal paranasal sinuses without tenderness   Mouth:  Lips, mucosa, and tongue normal. Teeth and gums normal   Neck: supple, symmetrical, trachea midline and no adenopathy. Heart: S1 and S2 normal, no murmurs noted. Lungs: clear to auscultation bilaterally   Abdomen: soft, non-tender. Bowel sounds normal. No masses,  no organomegaly   preg neg     Assessment/Plan:   viral upper respiratory illness and asthma  Suggested symptomatic OTC remedies. Antibiotics per orders. ICD-10-CM ICD-9-CM    1. Moderate persistent asthma with exacerbation J45.41 493.92 albuterol (VENTOLIN HFA) 90 mcg/actuation inhaler      AMB SUPPLY ORDER      albuterol (PROVENTIL VENTOLIN) 2.5 mg /3 mL (0.083 %) nebu      azithromycin (ZITHROMAX) 250 mg tablet   2. Irregular menstrual cycle N92.6 626.4 AMB POC URINE PREGNANCY TEST, VISUAL COLOR COMPARISON   3. Type 2 diabetes mellitus with hyperglycemia, without long-term current use of insulin (HCC) E11.65 250.00      790.29    4. Morbid obesity ([facility] [address] E66.01 278.01        Orders Placed This Encounter    AMB SUPPLY ORDER    AMB POC URINE PREGNANCY TEST, VISUAL COLOR COMPARISON    albuterol (VENTOLIN HFA) 90 mcg/actuation inhaler    albuterol (PROVENTIL VENTOLIN) 2.5 mg /3 mL (0.083 %) nebu    azithromycin (ZITHROMAX) 250 mg tablet   . The patient was counseled on the dangers of tobacco use, and was advised to quit. Reviewed strategies to maximize success, including stress management   Likely viral but if Sx not better in few days get AB  Has gained wt may switch her to Dm meds trhat lose weight. See patient instructions, went over them personally with the patient. Emphasized compliance. Questions answered. Patient states that they understand the plan of action and will call if there are any issues or misunderstandings. Follow-up and Dispositions    · Return in about 6 weeks (around 11/18/2019) for routine follow up. Madai Spencer

## 2019-10-07 NOTE — PROGRESS NOTES
C.o cough and chest congestion X 4 days - chest hurts when she coughs - no menstrual cycle in 2 months, weigh gain  Radha Anton LPN  80/6/0997  2:42 PM

## 2019-10-07 NOTE — PATIENT INSTRUCTIONS
Stopping Smoking: Care Instructions  Your Care Instructions  Cigarette smokers crave the nicotine in cigarettes. Giving it up is much harder than simply changing a habit. Your body has to stop craving the nicotine. It is hard to quit, but you can do it. There are many tools that people use to quit smoking. You may find that combining tools works best for you. There are several steps to quitting. First you get ready to quit. Then you get support to help you. After that, you learn new skills and behaviors to become a nonsmoker. For many people, a necessary step is getting and using medicine. Your doctor will help you set up the plan that best meets your needs. You may want to attend a smoking cessation program to help you quit smoking. When you choose a program, look for one that has proven success. Ask your doctor for ideas. You will greatly increase your chances of success if you take medicine as well as get counseling or join a cessation program.  Some of the changes you feel when you first quit tobacco are uncomfortable. Your body will miss the nicotine at first, and you may feel short-tempered and grumpy. You may have trouble sleeping or concentrating. Medicine can help you deal with these symptoms. You may struggle with changing your smoking habits and rituals. The last step is the tricky one: Be prepared for the smoking urge to continue for a time. This is a lot to deal with, but keep at it. You will feel better. Follow-up care is a key part of your treatment and safety. Be sure to make and go to all appointments, and call your doctor if you are having problems. It's also a good idea to know your test results and keep a list of the medicines you take. How can you care for yourself at home? · Ask your family, friends, and coworkers for support. You have a better chance of quitting if you have help and support.   · Join a support group, such as Nicotine Anonymous, for people who are trying to quit smoking. · Consider signing up for a smoking cessation program, such as the American Lung Association's Freedom from Smoking program.  · Get text messaging support. Go to the website at www.smokefree. gov to sign up for the [facility] program.  · Set a quit date. Pick your date carefully so that it is not right in the middle of a big deadline or stressful time. Once you quit, do not even take a puff. Get rid of all ashtrays and lighters after your last cigarette. Clean your house and your clothes so that they do not smell of smoke. · Learn how to be a nonsmoker. Think about ways you can avoid those things that make you reach for a cigarette. ? Avoid situations that put you at greatest risk for smoking. For some people, it is hard to have a drink with friends without smoking. For others, they might skip a coffee break with coworkers who smoke. ? Change your daily routine. Take a different route to work or eat a meal in a different place. · Cut down on stress. Calm yourself or release tension by doing an activity you enjoy, such as reading a book, taking a hot bath, or gardening. · Talk to your doctor or pharmacist about nicotine replacement therapy, which replaces the nicotine in your body. You still get nicotine but you do not use tobacco. Nicotine replacement products help you slowly reduce the amount of nicotine you need. These products come in several forms, many of them available over-the-counter:  ? Nicotine patches  ? Nicotine gum and lozenges  ? Nicotine inhaler  · Ask your doctor about bupropion (Wellbutrin) or varenicline (Chantix), which are prescription medicines. They do not contain nicotine. They help you by reducing withdrawal symptoms, such as stress and anxiety. · Some people find hypnosis, acupuncture, and massage helpful for ending the smoking habit. · Eat a healthy diet and get regular exercise. Having healthy habits will help your body move past its craving for nicotine.   · Be prepared to keep trying. Most people are not successful the first few times they try to quit. Do not get mad at yourself if you smoke again. Make a list of things you learned and think about when you want to try again, such as next week, next month, or next year. Where can you learn more? Go to http://sheree-frank.info/. Enter Y553 in the search box to learn more about \"Stopping Smoking: Care Instructions. \"  Current as of: September 26, 2018  Content Version: 12.2  © 2719-7677 [employer]. Care instructions adapted under license by [employer] (which disclaims liability or warranty for this information). If you have questions about a medical condition or this instruction, always ask your healthcare professional. [address] any warranty or liability for your use of this information.

## 2019-10-15 ENCOUNTER — DOCUMENTATION ONLY (OUTPATIENT)
Dept: INTERNAL MEDICINE CLINIC | Age: 34
End: 2019-10-15

## 2019-10-15 NOTE — PROGRESS NOTES
10/15/19 [address] called spoke with Jonelle Minaya due to receiving rejection on Ventolin  MCG/ACT,she state medication was picked up on 10/7/19, with 0 out of pocket cost,no PA needed Azithromycin 250 mg tablets ready for pickup,I requested her to call patient for picp,she verified understanding.

## 2020-02-24 ENCOUNTER — TELEPHONE (OUTPATIENT)
Dept: INTERNAL MEDICINE CLINIC | Age: 35
End: 2020-02-24

## 2020-02-24 DIAGNOSIS — E11.65 TYPE 2 DIABETES MELLITUS WITH HYPERGLYCEMIA, WITHOUT LONG-TERM CURRENT USE OF INSULIN (HCC): ICD-10-CM

## 2020-02-24 NOTE — TELEPHONE ENCOUNTER
Pt would like for [facility] to order a glucose monitor kit for patient. She has lost hers in a move.

## 2020-02-24 NOTE — TELEPHONE ENCOUNTER
Pt called in reference to needing a new glucose machine. When she moved she lost her other one. Please call [place] at [phone].

## 2020-02-26 RX ORDER — INSULIN PUMP SYRINGE, 3 ML
EACH MISCELLANEOUS
Qty: 1 KIT | Refills: 0 | Status: SHIPPED | OUTPATIENT
Start: 2020-02-26

## 2020-02-28 LAB
ANTIBODY SCREEN, EXTERNAL: NORMAL
CHLAMYDIA, EXTERNAL: NORMAL
HBSAG, EXTERNAL: NORMAL
HIV, EXTERNAL: NON REACTIVE
N. GONORRHEA, EXTERNAL: NORMAL
RPR, EXTERNAL: NON REACTIVE
RUBELLA, EXTERNAL: NORMAL

## 2020-03-11 ENCOUNTER — TELEPHONE (OUTPATIENT)
Dept: INTERNAL MEDICINE CLINIC | Age: 35
End: 2020-03-11

## 2020-03-11 DIAGNOSIS — E11.65 TYPE 2 DIABETES MELLITUS WITH HYPERGLYCEMIA, WITH LONG-TERM CURRENT USE OF INSULIN (HCC): Primary | ICD-10-CM

## 2020-03-11 DIAGNOSIS — Z79.4 TYPE 2 DIABETES MELLITUS WITH HYPERGLYCEMIA, WITH LONG-TERM CURRENT USE OF INSULIN (HCC): Primary | ICD-10-CM

## 2020-03-11 NOTE — TELEPHONE ENCOUNTER
----- Message from Haroldine Bumpers sent at 3/11/2020  3:03 PM EDT -----  Regarding: Dr. Lewis Powers: [phone]  Patient is needing a prescription for pin needles to go with her machine. Patient doesn't know the exact name of the machine but it was just called into [address] [place] [address] (on file).

## 2020-03-13 NOTE — TELEPHONE ENCOUNTER
One touch needles need to be sent to [address] [place] [address]  They gave the to her the first time.

## 2020-03-13 NOTE — TELEPHONE ENCOUNTER
Pt called back in reference to needing the pen needles so that she can check her sugar. She said that she only has 2 left. Please send to [phone].

## 2020-03-16 ENCOUNTER — TELEPHONE (OUTPATIENT)
Dept: INTERNAL MEDICINE CLINIC | Age: 35
End: 2020-03-16

## 2020-04-17 RX ORDER — METFORMIN HYDROCHLORIDE 500 MG/1
500 TABLET ORAL 2 TIMES DAILY WITH MEALS
Qty: 60 TAB | Refills: 10 | Status: SHIPPED | OUTPATIENT
Start: 2020-04-17

## 2020-09-18 LAB — GRBS, EXTERNAL: NORMAL

## 2020-10-01 ENCOUNTER — HOSPITAL ENCOUNTER (OUTPATIENT)
Dept: PREADMISSION TESTING | Age: 35
Discharge: HOME | End: 2020-10-01
Payer: MEDICAID

## 2020-10-01 PROCEDURE — 87635 SARS-COV-2 COVID-19 AMP PRB: CPT

## 2020-10-02 LAB
HEALTH STATUS, XMCV2T: NORMAL
SARS-COV-2, COV2NT: NOT DETECTED
SOURCE, COVRS: NORMAL
SPECIMEN SOURCE, FCOV2M: NORMAL
SPECIMEN TYPE, XMCV1T: NORMAL

## 2020-10-05 ENCOUNTER — HOSPITAL ENCOUNTER (INPATIENT)
Age: 35
LOS: 2 days | Discharge: HOME | DRG: 540 | End: 2020-10-07
Attending: SPECIALIST | Admitting: SPECIALIST
Payer: MEDICAID

## 2020-10-05 ENCOUNTER — ANESTHESIA EVENT (OUTPATIENT)
Dept: LABOR AND DELIVERY | Age: 35
DRG: 540 | End: 2020-10-05
Payer: MEDICAID

## 2020-10-05 ENCOUNTER — ANESTHESIA (OUTPATIENT)
Dept: LABOR AND DELIVERY | Age: 35
DRG: 540 | End: 2020-10-05
Payer: MEDICAID

## 2020-10-05 DIAGNOSIS — Z98.891 STATUS POST CESAREAN DELIVERY: Primary | ICD-10-CM

## 2020-10-05 PROBLEM — Z33.1 IUP (INTRAUTERINE PREGNANCY), INCIDENTAL: Status: ACTIVE | Noted: 2020-10-05

## 2020-10-05 LAB
ABO + RH BLD: NORMAL
ALBUMIN SERPL-MCNC: 2.5 G/DL (ref 3.5–5)
ALBUMIN/GLOB SERPL: 0.6 {RATIO} (ref 1.1–2.2)
ALP SERPL-CCNC: 108 U/L (ref 45–117)
ALT SERPL-CCNC: 126 U/L (ref 12–78)
ANION GAP SERPL CALC-SCNC: 8 MMOL/L (ref 5–15)
AST SERPL-CCNC: 58 U/L (ref 15–37)
BASOPHILS # BLD: 0 K/UL (ref 0–0.1)
BASOPHILS # BLD: 0.1 K/UL (ref 0–0.1)
BASOPHILS NFR BLD: 0 % (ref 0–1)
BASOPHILS NFR BLD: 1 % (ref 0–1)
BILIRUB SERPL-MCNC: 0.3 MG/DL (ref 0.2–1)
BLOOD GROUP ANTIBODIES SERPL: NORMAL
BUN SERPL-MCNC: 9 MG/DL (ref 6–20)
BUN/CREAT SERPL: 16 (ref 12–20)
CALCIUM SERPL-MCNC: 9 MG/DL (ref 8.5–10.1)
CHLORIDE SERPL-SCNC: 107 MMOL/L (ref 97–108)
CO2 SERPL-SCNC: 22 MMOL/L (ref 21–32)
CREAT SERPL-MCNC: 0.55 MG/DL (ref 0.55–1.02)
DIFFERENTIAL METHOD BLD: ABNORMAL
DIFFERENTIAL METHOD BLD: ABNORMAL
EOSINOPHIL # BLD: 0.1 K/UL (ref 0–0.4)
EOSINOPHIL # BLD: 0.1 K/UL (ref 0–0.4)
EOSINOPHIL NFR BLD: 1 % (ref 0–7)
EOSINOPHIL NFR BLD: 1 % (ref 0–7)
ERYTHROCYTE [DISTWIDTH] IN BLOOD BY AUTOMATED COUNT: 15.6 % (ref 11.5–14.5)
ERYTHROCYTE [DISTWIDTH] IN BLOOD BY AUTOMATED COUNT: 15.7 % (ref 11.5–14.5)
GLOBULIN SER CALC-MCNC: 4.4 G/DL (ref 2–4)
GLUCOSE BLD STRIP.AUTO-MCNC: 130 MG/DL (ref 65–100)
GLUCOSE BLD STRIP.AUTO-MCNC: 183 MG/DL (ref 65–100)
GLUCOSE BLD STRIP.AUTO-MCNC: 98 MG/DL (ref 65–100)
GLUCOSE SERPL-MCNC: 154 MG/DL (ref 65–100)
HCT VFR BLD AUTO: 34.5 % (ref 35–47)
HCT VFR BLD AUTO: 37 % (ref 35–47)
HGB BLD-MCNC: 10.9 G/DL (ref 11.5–16)
HGB BLD-MCNC: 11.7 G/DL (ref 11.5–16)
IMM GRANULOCYTES # BLD AUTO: 0.1 K/UL (ref 0–0.04)
IMM GRANULOCYTES # BLD AUTO: 0.1 K/UL (ref 0–0.04)
IMM GRANULOCYTES NFR BLD AUTO: 1 % (ref 0–0.5)
IMM GRANULOCYTES NFR BLD AUTO: 1 % (ref 0–0.5)
LYMPHOCYTES # BLD: 1.9 K/UL (ref 0.8–3.5)
LYMPHOCYTES # BLD: 2.2 K/UL (ref 0.8–3.5)
LYMPHOCYTES NFR BLD: 20 % (ref 12–49)
LYMPHOCYTES NFR BLD: 21 % (ref 12–49)
MCH RBC QN AUTO: 26.1 PG (ref 26–34)
MCH RBC QN AUTO: 26.5 PG (ref 26–34)
MCHC RBC AUTO-ENTMCNC: 31.6 G/DL (ref 30–36.5)
MCHC RBC AUTO-ENTMCNC: 31.6 G/DL (ref 30–36.5)
MCV RBC AUTO: 82.6 FL (ref 80–99)
MCV RBC AUTO: 83.7 FL (ref 80–99)
MONOCYTES # BLD: 0.4 K/UL (ref 0–1)
MONOCYTES # BLD: 0.7 K/UL (ref 0–1)
MONOCYTES NFR BLD: 4 % (ref 5–13)
MONOCYTES NFR BLD: 6 % (ref 5–13)
NEUTS SEG # BLD: 6.7 K/UL (ref 1.8–8)
NEUTS SEG # BLD: 7.6 K/UL (ref 1.8–8)
NEUTS SEG NFR BLD: 71 % (ref 32–75)
NEUTS SEG NFR BLD: 73 % (ref 32–75)
NRBC # BLD: 0 K/UL (ref 0–0.01)
NRBC # BLD: 0 K/UL (ref 0–0.01)
NRBC BLD-RTO: 0 PER 100 WBC
NRBC BLD-RTO: 0 PER 100 WBC
PLATELET # BLD AUTO: 227 K/UL (ref 150–400)
PLATELET # BLD AUTO: 270 K/UL (ref 150–400)
PMV BLD AUTO: 10.1 FL (ref 8.9–12.9)
PMV BLD AUTO: 9.9 FL (ref 8.9–12.9)
POTASSIUM SERPL-SCNC: 3.9 MMOL/L (ref 3.5–5.1)
PROT SERPL-MCNC: 6.9 G/DL (ref 6.4–8.2)
RBC # BLD AUTO: 4.12 M/UL (ref 3.8–5.2)
RBC # BLD AUTO: 4.48 M/UL (ref 3.8–5.2)
SERVICE CMNT-IMP: ABNORMAL
SERVICE CMNT-IMP: ABNORMAL
SERVICE CMNT-IMP: NORMAL
SODIUM SERPL-SCNC: 137 MMOL/L (ref 136–145)
SPECIMEN EXP DATE BLD: NORMAL
WBC # BLD AUTO: 10.7 K/UL (ref 3.6–11)
WBC # BLD AUTO: 9.2 K/UL (ref 3.6–11)

## 2020-10-05 PROCEDURE — 74011000250 HC RX REV CODE- 250: Performed by: ANESTHESIOLOGY

## 2020-10-05 PROCEDURE — 75410000002 HC LABOR FEE PER 1 HR

## 2020-10-05 PROCEDURE — 74011250636 HC RX REV CODE- 250/636: Performed by: REGISTERED NURSE

## 2020-10-05 PROCEDURE — 80053 COMPREHEN METABOLIC PANEL: CPT

## 2020-10-05 PROCEDURE — 65410000002 HC RM PRIVATE OB

## 2020-10-05 PROCEDURE — 2709999900 HC NON-CHARGEABLE SUPPLY

## 2020-10-05 PROCEDURE — 77030018809 HC RETRCTR ALXSO DISP AMR -B

## 2020-10-05 PROCEDURE — 76060000034 HC ANESTHESIA 1.5 TO 2 HR: Performed by: SPECIALIST

## 2020-10-05 PROCEDURE — 77030014125 HC TY EPDRL BBMI -B: Performed by: ANESTHESIOLOGY

## 2020-10-05 PROCEDURE — 74011000250 HC RX REV CODE- 250: Performed by: SPECIALIST

## 2020-10-05 PROCEDURE — 77030031139 HC SUT VCRL2 J&J -A

## 2020-10-05 PROCEDURE — 74011000258 HC RX REV CODE- 258: Performed by: SPECIALIST

## 2020-10-05 PROCEDURE — 74011250636 HC RX REV CODE- 250/636: Performed by: SPECIALIST

## 2020-10-05 PROCEDURE — 36415 COLL VENOUS BLD VENIPUNCTURE: CPT

## 2020-10-05 PROCEDURE — 88307 TISSUE EXAM BY PATHOLOGIST: CPT

## 2020-10-05 PROCEDURE — 76060000078 HC EPIDURAL ANESTHESIA: Performed by: SPECIALIST

## 2020-10-05 PROCEDURE — 77030007866 HC KT SPN ANES BBMI -B: Performed by: ANESTHESIOLOGY

## 2020-10-05 PROCEDURE — 77030018836 HC SOL IRR NACL ICUM -A

## 2020-10-05 PROCEDURE — 77030008459 HC STPLR SKN COOP -B

## 2020-10-05 PROCEDURE — 75410000003 HC RECOV DEL/VAG/CSECN EA 0.5 HR

## 2020-10-05 PROCEDURE — 74011250637 HC RX REV CODE- 250/637: Performed by: SPECIALIST

## 2020-10-05 PROCEDURE — 74011250636 HC RX REV CODE- 250/636: Performed by: ANESTHESIOLOGY

## 2020-10-05 PROCEDURE — 85025 COMPLETE CBC W/AUTO DIFF WBC: CPT

## 2020-10-05 PROCEDURE — 77010026065 HC OXYGEN MINIMUM MEDICAL AIR

## 2020-10-05 PROCEDURE — 86900 BLOOD TYPING SEROLOGIC ABO: CPT

## 2020-10-05 PROCEDURE — 77030005513 HC CATH URETH FOL11 MDII -B

## 2020-10-05 PROCEDURE — 76010000391 HC C SECN FIRST 1 HR: Performed by: SPECIALIST

## 2020-10-05 PROCEDURE — 76010000392 HC C SECN EA ADDL 0.5 HR: Performed by: SPECIALIST

## 2020-10-05 PROCEDURE — 82962 GLUCOSE BLOOD TEST: CPT

## 2020-10-05 DEVICE — BARRIER TISS ABSRB 5X6IN 1/PK -- DIRECT ORDER ONLY NON MEDLINE: Type: IMPLANTABLE DEVICE | Site: UTERUS | Status: FUNCTIONAL

## 2020-10-05 RX ORDER — DIPHENHYDRAMINE HYDROCHLORIDE 50 MG/ML
25-50 INJECTION, SOLUTION INTRAMUSCULAR; INTRAVENOUS
Status: DISCONTINUED | OUTPATIENT
Start: 2020-10-05 | End: 2020-10-07 | Stop reason: HOSPADM

## 2020-10-05 RX ORDER — OXYTOCIN/0.9 % SODIUM CHLORIDE 30/500 ML
500 PLASTIC BAG, INJECTION (ML) INTRAVENOUS ONCE
Status: COMPLETED | OUTPATIENT
Start: 2020-10-05 | End: 2020-10-05

## 2020-10-05 RX ORDER — IBUPROFEN 400 MG/1
800 TABLET ORAL EVERY 8 HOURS
Status: DISCONTINUED | OUTPATIENT
Start: 2020-10-05 | End: 2020-10-07 | Stop reason: HOSPADM

## 2020-10-05 RX ORDER — ONDANSETRON 2 MG/ML
4 INJECTION INTRAMUSCULAR; INTRAVENOUS
Status: DISCONTINUED | OUTPATIENT
Start: 2020-10-05 | End: 2020-10-07 | Stop reason: HOSPADM

## 2020-10-05 RX ORDER — BUPIVACAINE HYDROCHLORIDE 7.5 MG/ML
INJECTION, SOLUTION EPIDURAL; RETROBULBAR AS NEEDED
Status: DISCONTINUED | OUTPATIENT
Start: 2020-10-05 | End: 2020-10-05 | Stop reason: HOSPADM

## 2020-10-05 RX ORDER — SODIUM CHLORIDE 0.9 % (FLUSH) 0.9 %
5-40 SYRINGE (ML) INJECTION EVERY 8 HOURS
Status: DISCONTINUED | OUTPATIENT
Start: 2020-10-05 | End: 2020-10-07 | Stop reason: HOSPADM

## 2020-10-05 RX ORDER — SODIUM CHLORIDE 0.9 % (FLUSH) 0.9 %
5-40 SYRINGE (ML) INJECTION AS NEEDED
Status: DISCONTINUED | OUTPATIENT
Start: 2020-10-05 | End: 2020-10-07 | Stop reason: HOSPADM

## 2020-10-05 RX ORDER — LANOLIN ALCOHOL/MO/W.PET/CERES
CREAM (GRAM) TOPICAL
COMMUNITY

## 2020-10-05 RX ORDER — ZOLPIDEM TARTRATE 5 MG/1
5 TABLET ORAL
Status: DISCONTINUED | OUTPATIENT
Start: 2020-10-05 | End: 2020-10-07 | Stop reason: HOSPADM

## 2020-10-05 RX ORDER — INSULIN LISPRO 100 [IU]/ML
INJECTION, SOLUTION INTRAVENOUS; SUBCUTANEOUS
Status: DISCONTINUED | OUTPATIENT
Start: 2020-10-05 | End: 2020-10-07 | Stop reason: HOSPADM

## 2020-10-05 RX ORDER — DOCUSATE SODIUM 100 MG/1
100 CAPSULE, LIQUID FILLED ORAL 2 TIMES DAILY
Status: DISCONTINUED | OUTPATIENT
Start: 2020-10-05 | End: 2020-10-07 | Stop reason: HOSPADM

## 2020-10-05 RX ORDER — CHOLECALCIFEROL (VITAMIN D3) 125 MCG
2000 CAPSULE ORAL DAILY
COMMUNITY

## 2020-10-05 RX ORDER — MISOPROSTOL 200 UG/1
TABLET ORAL
Status: DISPENSED
Start: 2020-10-05 | End: 2020-10-05

## 2020-10-05 RX ORDER — ASPIRIN 81 MG/1
TABLET ORAL DAILY
COMMUNITY

## 2020-10-05 RX ORDER — SIMETHICONE 80 MG
80 TABLET,CHEWABLE ORAL AS NEEDED
Status: DISCONTINUED | OUTPATIENT
Start: 2020-10-05 | End: 2020-10-07 | Stop reason: HOSPADM

## 2020-10-05 RX ORDER — DEXTROSE 50 % IN WATER (D50W) INTRAVENOUS SYRINGE
12.5-25 AS NEEDED
Status: DISCONTINUED | OUTPATIENT
Start: 2020-10-05 | End: 2020-10-07 | Stop reason: HOSPADM

## 2020-10-05 RX ORDER — OXYTOCIN/RINGER'S LACTATE 20/1000 ML
PLASTIC BAG, INJECTION (ML) INTRAVENOUS
Status: DISCONTINUED | OUTPATIENT
Start: 2020-10-05 | End: 2020-10-05 | Stop reason: HOSPADM

## 2020-10-05 RX ORDER — SODIUM CHLORIDE, SODIUM LACTATE, POTASSIUM CHLORIDE, CALCIUM CHLORIDE 600; 310; 30; 20 MG/100ML; MG/100ML; MG/100ML; MG/100ML
125 INJECTION, SOLUTION INTRAVENOUS CONTINUOUS
Status: DISCONTINUED | OUTPATIENT
Start: 2020-10-05 | End: 2020-10-07 | Stop reason: HOSPADM

## 2020-10-05 RX ORDER — SODIUM CHLORIDE 0.9 % (FLUSH) 0.9 %
5-40 SYRINGE (ML) INJECTION AS NEEDED
Status: DISCONTINUED | OUTPATIENT
Start: 2020-10-05 | End: 2020-10-05 | Stop reason: HOSPADM

## 2020-10-05 RX ORDER — ONDANSETRON 2 MG/ML
INJECTION INTRAMUSCULAR; INTRAVENOUS AS NEEDED
Status: DISCONTINUED | OUTPATIENT
Start: 2020-10-05 | End: 2020-10-05 | Stop reason: HOSPADM

## 2020-10-05 RX ORDER — MISOPROSTOL 100 UG/1
TABLET ORAL AS NEEDED
Status: DISCONTINUED | OUTPATIENT
Start: 2020-10-05 | End: 2020-10-07 | Stop reason: HOSPADM

## 2020-10-05 RX ORDER — PHENYLEPHRINE HCL IN 0.9% NACL 0.4MG/10ML
SYRINGE (ML) INTRAVENOUS AS NEEDED
Status: DISCONTINUED | OUTPATIENT
Start: 2020-10-05 | End: 2020-10-05 | Stop reason: HOSPADM

## 2020-10-05 RX ORDER — MAGNESIUM SULFATE 100 %
4 CRYSTALS MISCELLANEOUS AS NEEDED
Status: DISCONTINUED | OUTPATIENT
Start: 2020-10-05 | End: 2020-10-07 | Stop reason: HOSPADM

## 2020-10-05 RX ORDER — SODIUM CHLORIDE 0.9 % (FLUSH) 0.9 %
5-40 SYRINGE (ML) INJECTION EVERY 8 HOURS
Status: DISCONTINUED | OUTPATIENT
Start: 2020-10-05 | End: 2020-10-05 | Stop reason: HOSPADM

## 2020-10-05 RX ORDER — SODIUM CHLORIDE, SODIUM LACTATE, POTASSIUM CHLORIDE, CALCIUM CHLORIDE 600; 310; 30; 20 MG/100ML; MG/100ML; MG/100ML; MG/100ML
1000 INJECTION, SOLUTION INTRAVENOUS CONTINUOUS
Status: DISCONTINUED | OUTPATIENT
Start: 2020-10-05 | End: 2020-10-05 | Stop reason: HOSPADM

## 2020-10-05 RX ORDER — OXYTOCIN/0.9 % SODIUM CHLORIDE 30/500 ML
95 PLASTIC BAG, INJECTION (ML) INTRAVENOUS ONCE
Status: ACTIVE | OUTPATIENT
Start: 2020-10-05 | End: 2020-10-05

## 2020-10-05 RX ORDER — LABETALOL 100 MG/1
100 TABLET, FILM COATED ORAL 2 TIMES DAILY
COMMUNITY

## 2020-10-05 RX ORDER — OXYCODONE AND ACETAMINOPHEN 5; 325 MG/1; MG/1
1 TABLET ORAL
Status: DISCONTINUED | OUTPATIENT
Start: 2020-10-05 | End: 2020-10-07 | Stop reason: HOSPADM

## 2020-10-05 RX ORDER — NALOXONE HYDROCHLORIDE 0.4 MG/ML
0.4 INJECTION, SOLUTION INTRAMUSCULAR; INTRAVENOUS; SUBCUTANEOUS AS NEEDED
Status: DISCONTINUED | OUTPATIENT
Start: 2020-10-05 | End: 2020-10-07 | Stop reason: HOSPADM

## 2020-10-05 RX ORDER — LABETALOL 100 MG/1
100 TABLET, FILM COATED ORAL 2 TIMES DAILY
Status: DISCONTINUED | OUTPATIENT
Start: 2020-10-05 | End: 2020-10-07 | Stop reason: HOSPADM

## 2020-10-05 RX ORDER — KETOROLAC TROMETHAMINE 30 MG/ML
INJECTION, SOLUTION INTRAMUSCULAR; INTRAVENOUS AS NEEDED
Status: DISCONTINUED | OUTPATIENT
Start: 2020-10-05 | End: 2020-10-05 | Stop reason: HOSPADM

## 2020-10-05 RX ORDER — OXYTOCIN/0.9 % SODIUM CHLORIDE 30/500 ML
PLASTIC BAG, INJECTION (ML) INTRAVENOUS ONCE
Status: DISPENSED | OUTPATIENT
Start: 2020-10-05 | End: 2020-10-05

## 2020-10-05 RX ORDER — SODIUM CHLORIDE, SODIUM LACTATE, POTASSIUM CHLORIDE, CALCIUM CHLORIDE 600; 310; 30; 20 MG/100ML; MG/100ML; MG/100ML; MG/100ML
INJECTION, SOLUTION INTRAVENOUS
Status: DISCONTINUED | OUTPATIENT
Start: 2020-10-05 | End: 2020-10-05 | Stop reason: HOSPADM

## 2020-10-05 RX ORDER — HYDROMORPHONE HYDROCHLORIDE 2 MG/ML
INJECTION, SOLUTION INTRAMUSCULAR; INTRAVENOUS; SUBCUTANEOUS AS NEEDED
Status: DISCONTINUED | OUTPATIENT
Start: 2020-10-05 | End: 2020-10-05 | Stop reason: HOSPADM

## 2020-10-05 RX ADMIN — SODIUM CHLORIDE, SODIUM LACTATE, POTASSIUM CHLORIDE, AND CALCIUM CHLORIDE 125 ML/HR: 600; 310; 30; 20 INJECTION, SOLUTION INTRAVENOUS at 21:48

## 2020-10-05 RX ADMIN — HYDROMORPHONE HYDROCHLORIDE 0.1 MG: 2 INJECTION, SOLUTION INTRAMUSCULAR; INTRAVENOUS; SUBCUTANEOUS at 09:00

## 2020-10-05 RX ADMIN — DOCUSATE SODIUM 100 MG: 100 CAPSULE, LIQUID FILLED ORAL at 13:27

## 2020-10-05 RX ADMIN — SODIUM CHLORIDE, SODIUM LACTATE, POTASSIUM CHLORIDE, AND CALCIUM CHLORIDE 1000 ML: 600; 310; 30; 20 INJECTION, SOLUTION INTRAVENOUS at 07:16

## 2020-10-05 RX ADMIN — SODIUM CHLORIDE, POTASSIUM CHLORIDE, SODIUM LACTATE AND CALCIUM CHLORIDE: 600; 310; 30; 20 INJECTION, SOLUTION INTRAVENOUS at 08:54

## 2020-10-05 RX ADMIN — OXYCODONE HYDROCHLORIDE AND ACETAMINOPHEN 1 TABLET: 5; 325 TABLET ORAL at 20:01

## 2020-10-05 RX ADMIN — CEFAZOLIN SODIUM 3 G: 10 INJECTION, POWDER, FOR SOLUTION INTRAVENOUS at 08:30

## 2020-10-05 RX ADMIN — IBUPROFEN 800 MG: 400 TABLET ORAL at 13:27

## 2020-10-05 RX ADMIN — DOCUSATE SODIUM 100 MG: 100 CAPSULE, LIQUID FILLED ORAL at 18:54

## 2020-10-05 RX ADMIN — Medication 30000 MILLI-UNITS/HR: at 11:16

## 2020-10-05 RX ADMIN — SODIUM CHLORIDE, SODIUM LACTATE, POTASSIUM CHLORIDE, AND CALCIUM CHLORIDE 1000 ML: 600; 310; 30; 20 INJECTION, SOLUTION INTRAVENOUS at 06:10

## 2020-10-05 RX ADMIN — ONDANSETRON HYDROCHLORIDE 4 MG: 2 INJECTION, SOLUTION INTRAMUSCULAR; INTRAVENOUS at 09:11

## 2020-10-05 RX ADMIN — KETOROLAC TROMETHAMINE 30 MG: 30 INJECTION, SOLUTION INTRAMUSCULAR; INTRAVENOUS at 09:54

## 2020-10-05 RX ADMIN — Medication 80 MCG: at 09:21

## 2020-10-05 RX ADMIN — BUPIVACAINE HYDROCHLORIDE 10.5 MG: 7.5 INJECTION, SOLUTION EPIDURAL; RETROBULBAR at 09:00

## 2020-10-05 RX ADMIN — IBUPROFEN 800 MG: 400 TABLET ORAL at 21:48

## 2020-10-05 RX ADMIN — Medication 80 MCG: at 09:11

## 2020-10-05 RX ADMIN — Medication 999 ML/HR: at 09:40

## 2020-10-05 RX ADMIN — SODIUM CHLORIDE, SODIUM LACTATE, POTASSIUM CHLORIDE, AND CALCIUM CHLORIDE 125 ML/HR: 600; 310; 30; 20 INJECTION, SOLUTION INTRAVENOUS at 12:17

## 2020-10-05 NOTE — PROGRESS NOTES
7:15 AM  Bedside shift change report given to Morenita Mckeon RN (oncoming nurse) by Cady Wasserman RN (offgoing nurse). Report included the following information SBAR, Kardex, Intake/Output, MAR and Recent Results.

## 2020-10-05 NOTE — PROGRESS NOTES
3179- Pt OOB to bathroom with 2 staff assist.  Manolo Morales, pad change and linen change complete. Bed bath completed. Pt to chair. Call bell and phone in reach and pt instructed to call to get out of chair.

## 2020-10-05 NOTE — ANESTHESIA PREPROCEDURE EVALUATION
Anesthetic History   No history of anesthetic complications            Review of Systems / Medical History  Patient summary reviewed and pertinent labs reviewed    Pulmonary          Smoker  Asthma : well controlled       Neuro/Psych   Within defined limits           Cardiovascular    Hypertension: well controlled              Exercise tolerance: >4 METS     GI/Hepatic/Renal  Within defined limits              Endo/Other    Diabetes: well controlled, type 2    Morbid obesity and anemia     Other Findings   Comments: IUP  Previous c/s           Physical Exam    Airway  Mallampati: III  TM Distance: 4 - 6 cm  Neck ROM: normal range of motion, short neck   Mouth opening: Normal     Cardiovascular    Rhythm: regular  Rate: normal         Dental    Dentition: Poor dentition     Pulmonary  Breath sounds clear to auscultation               Abdominal  GI exam deferred       Other Findings            Anesthetic Plan    ASA: 3  Anesthesia type: spinal      Post-op pain plan if not by surgeon: indwelling epidural catheter      Anesthetic plan and risks discussed with: Patient

## 2020-10-05 NOTE — H&P
History & Physical    Name: Bebe Alcantar MRN: [id number]  SSN: [id number]    YOB: 1985  Age: 37 y.o. Sex: female      Subjective:     Estimated Date of Delivery: 10/17/20  OB History    Para Term  AB Living   3 1 1   1 1   SAB TAB Ectopic Molar Multiple Live Births   1       0 1      # Outcome Date GA Lbr Byron/2nd Weight Sex Delivery Anes PTL Lv   3 Current            2 SAB 18           1 Term 17 38w1d  4.18 kg F CS-LTranv EPIDURAL AN, CSE N VÍCTOR      Complications: Fetal Intolerance, Failure to Progress in First Stage       Ms. Bangura admitted with pregnancy at 38w2d for  section due to previous  section. Prenatal course was complicated by diabetes - Type 1 and obesity. Please see prenatal records for details.     Past Medical History:   Diagnosis Date    Abnormal Papanicolaou smear of cervix     HPV    Anemia     Asthma     Diabetes ([address]     Hypertension     Phlebitis and thrombophlebitis      Past Surgical History:   Procedure Laterality Date    HX  SECTION  2017    HX TYMPANOSTOMY       Social History     Occupational History    Occupation: disabled   Tobacco Use    Smoking status: Current Every Day Smoker     Packs/day: 0.15     Years: 15.00     Pack years: 2.25     Types: Cigarettes     Start date: 3/23/2004     Last attempt to quit: 2017     Years since quitting: 3.7    Smokeless tobacco: Never Used   Substance and Sexual Activity    Alcohol use: No     Alcohol/week: 0.0 standard drinks    Drug use: No    Sexual activity: Yes     Partners: Male     Family History   Problem Relation Age of Onset    Diabetes Father     Heart Disease Father         CAD    Hypertension Father     Diabetes Mother     Hypertension Mother     Liver Disease Mother     Deep Vein Thrombosis Mother     Pulmonary Embolism Mother     Hypertension Sister     Deep Vein Thrombosis Sister     Pulmonary Embolism Sister     Diabetes Maternal Uncle  Cancer Maternal Grandmother         Lung Cancer       Allergies   Allergen Reactions    Mirapex [Pramipexole] Drowsiness    Morphine Shortness of Breath and Rash    Tramadol Other (comments)     headache     Prior to Admission medications    Medication Sig Start Date End Date Taking? Authorizing Provider   labetaloL (NORMODYNE) 100 mg tablet Take 100 mg by mouth two (2) times a day. Yes Provider, Historical   docusate sodium (COLACE) 50 mg capsule Take 50 mg by mouth two (2) times a day. Yes Provider, Historical   ferrous sulfate (Iron) 325 mg (65 mg iron) tablet Take  by mouth Daily (before breakfast). Yes Provider, Historical   aspirin delayed-release 81 mg tablet Take  by mouth daily. Yes Provider, Historical   cholecalciferol, vitamin D3, (Vitamin D3) 50 mcg (2,000 unit) tab Take 2,000 mcg by mouth daily. Yes Provider, Historical   PNV Comb #2-Iron-FA-Omega 3 29-1-400 mg cmpk Take  by mouth. Yes Provider, Historical   metFORMIN (GLUCOPHAGE) 500 mg tablet TAKE 1 TAB BY MOUTH TWO (2) TIMES DAILY (WITH MEALS). Patient taking differently: Take 500 mg by mouth daily (with breakfast). 4/17/20  Yes Curtis Teran MD   glucose blood VI test strips (ASCENSIA AUTODISC VI, ONE TOUCH ULTRA TEST VI) strip May check blood sugar daily, true matrix strips 2/26/20  Yes Curtis Teran MD   Blood-Glucose Meter monitoring kit Use up to three times per days 2/26/20  Yes Curtis Teran MD   albuterol (VENTOLIN HFA) 90 mcg/actuation inhaler TAKE TWO PUFFS BY INHALATION EVERY FOUR HOURS AS NEEDED FOR WHEEZING. 10/7/19  Yes Curtis Teran MD   albuterol (PROVENTIL VENTOLIN) 2.5 mg /3 mL (0.083 %) nebu 3 mL by Nebulization route every four (4) hours as needed (wheezing). 10/7/19   Curtis Teran MD   azithromycin (ZITHROMAX) 250 mg tablet Take 1 Tab by mouth See Admin Instructions.  Take two tablets today then one tablet daily for next 4 days 10/7/19   Curtis Teran MD   diclofenac EC (VOLTAREN) 50 mg EC tablet Take 1 Tab by mouth two (2) times a day. 19   Roger Acuna MD   pioglitazone (ACTOS) 30 mg tablet Take 1 Tab by mouth daily. 19   Roger Acuna MD   norethindrone-ethinyl estradiol (ORTHO-NOVUM 1-35 TAB, NORTREL 1-35 TAB) 1-35 mg-mcg tab Take 1 Tab by mouth daily. 18   Roger Acuna MD        Review of Systems: A comprehensive review of systems was negative except for that written in the History of Present Illness. Objective:     Vitals:  Vitals:    10/05/20 0705 10/05/20 0706 10/05/20 0720 10/05/20 0721   BP:  125/68  127/67   Pulse:  75  75   Resp:    18   Temp:    98 °F (36.7 °C)   SpO2: 100%  100% 100%   Weight:       Height:            Physical Exam:  Deferred  Membranes:  Intact  Fetal Heart Rate: Reactive  Baseline: 130 per minute    Prenatal Labs:   Lab Results   Component Value Date/Time    ABO/Rh(D) B POSITIVE 10/05/2020 06:25 AM    Rubella, External immune 2020    GrBStrep, External pos 2020    HBsAg, External neg 2020    HIV, External non reactive 2020    RPR, External non reactive 2020    Gonorrhea, External n 2020    Chlamydia, External n 2020    ABO,Rh B+ 2016         Impression/Plan:     Plan:  Admit for  section. . Discussed the risks of surgery including the risks of bleeding, infection, deep vein thrombosis, and surgical injuries to internal organs including but not limited to the bowels, bladder, rectum, and female reproductive organs. The patient understands the risks; any and all questions were answered to the patient's satisfaction.

## 2020-10-05 NOTE — ANESTHESIA POSTPROCEDURE EVALUATION
Procedure(s):   SECTION. spinal    Anesthesia Post Evaluation        Patient location during evaluation: PACU  Note status: Adequate. Level of consciousness: responsive to verbal stimuli and sleepy but conscious  Pain management: satisfactory to patient  Airway patency: patent  Anesthetic complications: no  Cardiovascular status: acceptable  Respiratory status: acceptable  Hydration status: acceptable  Comments: +Post-Anesthesia Evaluation and Assessment    Patient: Niles Reid MRN: [id number]  SSN: [id number]   YOB: 1985  Age: 37 y.o. Sex: female      Cardiovascular Function/Vital Signs    /86 (BP 1 Location: Left arm, BP Patient Position: At rest)   Pulse 62   Temp 36.3 °C (97.4 °F)   Resp 18   Ht 5' 6\" (1.676 m)   Wt 119.3 kg (263 lb)   SpO2 100%   BMI 42.45 kg/m²     Patient is status post Procedure(s):   SECTION. Nausea/Vomiting: Controlled. Postoperative hydration reviewed and adequate. Pain:  Pain Scale 1: Numeric (0 - 10) (10/05/20 1045)  Pain Intensity 1: 0 (10/05/20 1045)   Managed. Neurological Status:   Neuro (WDL): Within Defined Limits (10/05/20 0721)   At baseline. Mental Status and Level of Consciousness: Arousable. Pulmonary Status:   O2 Device: Room air (10/05/20 1028)   Adequate oxygenation and airway patent. Complications related to anesthesia: None    Post-anesthesia assessment completed. No concerns.     Signed By: Mike Almaraz MD    10/5/2020  Post anesthesia nausea and vomiting:  controlled      INITIAL Post-op Vital signs:   Vitals Value Taken Time   /86 10/5/2020 10:45 AM   Temp     Pulse 62 10/5/2020 10:45 AM   Resp 18 10/5/2020 10:45 AM   SpO2 100 % 10/5/2020 10:45 AM

## 2020-10-05 NOTE — ANESTHESIA PROCEDURE NOTES
Spinal Block    Performed by: Erich Rodas MD  Authorized by: Erich Rodas MD                 Spinal Procedure Note    Risk and benefits were discussed with the patient and plans are to proceed. Patient was placed in the seated position. The back was prepped at the lumbar region with Duraprep. 3 mL 1% lidocaine was used as local.    Epidural space identified with 17 gauge Touhy @ L3 - L4    A 25 g pencil point spinal needle was placed  and advanced until CSF was obtained. 1.4 mL 0.75% Spinal Marcaine with dextrose + 0.1 mg hydromorphone was deposited into the CSF. - paresthesia.

## 2020-10-05 NOTE — PROGRESS NOTES
Bedside shift report received from Galilea Salazar RN. Assumed care of patient. Patient placed in position of comfort. Call bell in reach. 0745: Dr. Paramjit Cantu notified of pt blood sugar of 154 and that pt pump was discontinued over the weekend due to pt running out of supplies. Pt states doing sliding scale insulin and no coverage today. Orders received to start sliding scale insulin coverage post . 6911: Pt taken to OR at this time. 1100: Dr. Gayla Dunne notified of pt having moderating bleeding with clots; a telephone order with read back was received to give pt 30 units pitocin in 500 NS bag at a rate of 500 mL/hr.    1153: Dr. Paramjit Cantu called and notified of QBL of 1270 mL at this time; bleeding is small/moderates, no clots at this time; a telephone order with read back was received for a stat CBC; per MD continue to monitor pt.    1330: Off going bedside report given to Omaira Yap RN.

## 2020-10-06 LAB
BASOPHILS # BLD: 0.1 K/UL (ref 0–0.1)
BASOPHILS NFR BLD: 1 % (ref 0–1)
DIFFERENTIAL METHOD BLD: ABNORMAL
EOSINOPHIL # BLD: 0.2 K/UL (ref 0–0.4)
EOSINOPHIL NFR BLD: 2 % (ref 0–7)
ERYTHROCYTE [DISTWIDTH] IN BLOOD BY AUTOMATED COUNT: 15.7 % (ref 11.5–14.5)
GLUCOSE BLD STRIP.AUTO-MCNC: 132 MG/DL (ref 65–100)
GLUCOSE BLD STRIP.AUTO-MCNC: 162 MG/DL (ref 65–100)
GLUCOSE BLD STRIP.AUTO-MCNC: 173 MG/DL (ref 65–100)
GLUCOSE BLD STRIP.AUTO-MCNC: 180 MG/DL (ref 65–100)
HCT VFR BLD AUTO: 29.6 % (ref 35–47)
HGB BLD-MCNC: 9.3 G/DL (ref 11.5–16)
IMM GRANULOCYTES # BLD AUTO: 0.1 K/UL (ref 0–0.04)
IMM GRANULOCYTES NFR BLD AUTO: 1 % (ref 0–0.5)
LYMPHOCYTES # BLD: 2.4 K/UL (ref 0.8–3.5)
LYMPHOCYTES NFR BLD: 24 % (ref 12–49)
MCH RBC QN AUTO: 26.3 PG (ref 26–34)
MCHC RBC AUTO-ENTMCNC: 31.4 G/DL (ref 30–36.5)
MCV RBC AUTO: 83.9 FL (ref 80–99)
MONOCYTES # BLD: 0.5 K/UL (ref 0–1)
MONOCYTES NFR BLD: 5 % (ref 5–13)
NEUTS SEG # BLD: 6.9 K/UL (ref 1.8–8)
NEUTS SEG NFR BLD: 67 % (ref 32–75)
NRBC # BLD: 0 K/UL (ref 0–0.01)
NRBC BLD-RTO: 0 PER 100 WBC
PLATELET # BLD AUTO: 220 K/UL (ref 150–400)
PMV BLD AUTO: 9.9 FL (ref 8.9–12.9)
RBC # BLD AUTO: 3.53 M/UL (ref 3.8–5.2)
SERVICE CMNT-IMP: ABNORMAL
WBC # BLD AUTO: 10 K/UL (ref 3.6–11)

## 2020-10-06 PROCEDURE — 36415 COLL VENOUS BLD VENIPUNCTURE: CPT

## 2020-10-06 PROCEDURE — 82962 GLUCOSE BLOOD TEST: CPT

## 2020-10-06 PROCEDURE — 65410000002 HC RM PRIVATE OB

## 2020-10-06 PROCEDURE — 85025 COMPLETE CBC W/AUTO DIFF WBC: CPT

## 2020-10-06 PROCEDURE — 74011636637 HC RX REV CODE- 636/637: Performed by: SPECIALIST

## 2020-10-06 PROCEDURE — 74011250637 HC RX REV CODE- 250/637: Performed by: SPECIALIST

## 2020-10-06 RX ADMIN — IBUPROFEN 800 MG: 400 TABLET ORAL at 22:18

## 2020-10-06 RX ADMIN — IBUPROFEN 800 MG: 400 TABLET ORAL at 14:20

## 2020-10-06 RX ADMIN — DOCUSATE SODIUM 100 MG: 100 CAPSULE, LIQUID FILLED ORAL at 12:20

## 2020-10-06 RX ADMIN — DOCUSATE SODIUM 100 MG: 100 CAPSULE, LIQUID FILLED ORAL at 17:19

## 2020-10-06 RX ADMIN — INSULIN LISPRO 2 UNITS: 100 INJECTION, SOLUTION INTRAVENOUS; SUBCUTANEOUS at 08:43

## 2020-10-06 RX ADMIN — INSULIN LISPRO 2 UNITS: 100 INJECTION, SOLUTION INTRAVENOUS; SUBCUTANEOUS at 17:18

## 2020-10-06 RX ADMIN — INSULIN LISPRO 2 UNITS: 100 INJECTION, SOLUTION INTRAVENOUS; SUBCUTANEOUS at 12:20

## 2020-10-06 RX ADMIN — IBUPROFEN 800 MG: 400 TABLET ORAL at 06:20

## 2020-10-06 NOTE — ROUTINE PROCESS
Bedside and Verbal shift change report given to YOLA Wyman RN (oncoming nurse) by Elisa Corona RN (offgoing nurse). Report included the following information SBAR, Procedure Summary, Intake/Output and MAR.

## 2020-10-06 NOTE — PROGRESS NOTES
Duramorph Follow-Up Note    1 Day Post-Op sp Procedure(s):   SECTION. /67 (BP 1 Location: Left arm, BP Patient Position: At rest)   Pulse 74   Temp 36.9 °C (98.4 °F)   Resp 16   Ht 5' 6\" (1.676 m)   Wt 119.3 kg (263 lb)   SpO2 98%   Breastfeeding Unknown   BMI 42.45 kg/m² . Patient is POD-1 S/P intrathecal duramorph. Pain is well controlled  Patient reports no headache, fever, weakness or numbness. Epidural/spinal tap site is clean, dry and intact. No obvious Anesthesia complications noted. Plan:    Pain management as per primary service.

## 2020-10-06 NOTE — PROGRESS NOTES
0- Spoke with Dr. Leandra Cloud regarding pts BP's and labetolol. Order received to hold labetolol for now and restart if BP's consistently over 140/90. Pt only prescribed short acting insulin and will call DR. Aburto if needing consistent coverage throughout the day.

## 2020-10-06 NOTE — OP NOTES
[address]  OPERATIVE REPORT    Name:  Edward Crandall  MR#:  [id number]  :  1985  ACCOUNT #:  [de-identified]  DATE OF SERVICE:  10/05/2020      PREOPERATIVE DIAGNOSIS:  Previous  section and type 1 diabetes. POSTOPERATIVE DIAGNOSIS:  same. PROCEDURE PERFORMED:  Repeat low-flap  section. SURGEON:  Julien Villatoro MD    ASSISTANT:  Kaela Gomez MD    ANESTHESIA:  Spinal.    ANESTHESIOLOGIST:  Chelo Major MD    COMPLICATIONS:  None. SPECIMENS REMOVED:  Pathology, we sent the placent. ESTIMATED BLOOD LOSS:  About 800. INDICATION:  She is at 38 week pregnancy, G2 para 1 with a previous history of  section and she is type 1 diabetes. She is about 38 weeks pregnant. IV FLUID:  About 1200 mL lactated Ringer. URINE OUTPUT:  300 mL, clear. PROCEDURE:  After ensuring informed consent, I expressed to the patient the risks, benefits, and alternatives. The patient was taken to the OR, where she was given spinal anesthesia and she was prepped and draped in the usual sterile fashion. A Pfannenstiel skin incision was made and carried down to the level of the fascia. The fascia was nicked up in the middle and extended bilaterally with the Choi scissors. The vesicouterine peritoneum was entered sharply and extended bilaterally with the Metzenbaum. An Erwin Janie was introduced and the uterus was entered with the scalpel and extended bilaterally with the Metzenbaum. The baby's head was delivered atraumatically. Oropharynx was suctioned. Cord was clamped and cut. Placenta was manually removed. The uterus was not exteriorized. We actually cleaned the area with 0 Vicryl in a running locking fashion. We closed the uterus and then we imbricated with another 0 Vicryl, non-locking. The gutters were cleaned. Shaista was placed over the uterine incision. The Erwin Janie was removed. A #1 Vicryl in a running fashion was placed in the fascia.   Complete hemostasis was seen. The skin was closed with subcutaneous staples. and DEANGELO was placed over the incision. Sponge counts and instruments were correct x2. The patient tolerated the procedure well and she was transferred to the recovery room in stable condition.         MD JOSE [email or web address]  D:  10/05/2020 13:20  T:  10/05/2020 23:59  JOB #:  [id number]

## 2020-10-06 NOTE — PROGRESS NOTES
Post-Operative Rounding Note    Remi Gibson 37 y.o.      POD # 1 s/p RLTCS at 38w2d     Subjective:  Patient doing well. Has no complaints. Pain is well controlled. Tolerating a regular diet. Lochia appropriate per patient. No nausea, vomiting, fever, chills, CP, SOB, calf pain. Objective:  VS:   Visit Vitals  /69 (BP 1 Location: Left arm, BP Patient Position: At rest)   Pulse 73   Temp 98.5 °F (36.9 °C)   Resp 16   Ht 5' 6\" (1.676 m)   Wt 119.3 kg (263 lb)   SpO2 98%   Breastfeeding Unknown   BMI 42.45 kg/m²      Gen: NAD  CV: RRR, S1S2  Resp: CTABL  Abd: soft, appropriately TTP, no peritoneal signs, uterine fundus firm below the umbilicus, + BS, non-distended  Inc: DEANGELO's dressing in place. No shadowing   Ext: warm, dry, nontender, with no edema noted, negative Homans sign     Labs:   Recent Results (from the past 24 hour(s))   CBC WITH AUTOMATED DIFF    Collection Time: 10/05/20 12:09 PM   Result Value Ref Range    WBC 9.2 3.6 - 11.0 K/uL    RBC 4.12 3.80 - 5.20 M/uL    HGB 10.9 (L) 11.5 - 16.0 g/dL    HCT 34.5 (L) 35.0 - 47.0 %    MCV 83.7 80.0 - 99.0 FL    MCH 26.5 26.0 - 34.0 PG    MCHC 31.6 30.0 - 36.5 g/dL    RDW 15.6 (H) 11.5 - 14.5 %    PLATELET 870 243 - 419 K/uL    MPV 9.9 8.9 - 12.9 FL    NRBC 0.0 0  WBC    ABSOLUTE NRBC 0.00 0.00 - 0.01 K/uL    NEUTROPHILS 73 32 - 75 %    LYMPHOCYTES 21 12 - 49 %    MONOCYTES 4 (L) 5 - 13 %    EOSINOPHILS 1 0 - 7 %    BASOPHILS 0 0 - 1 %    IMMATURE GRANULOCYTES 1 (H) 0.0 - 0.5 %    ABS. NEUTROPHILS 6.7 1.8 - 8.0 K/UL    ABS. LYMPHOCYTES 1.9 0.8 - 3.5 K/UL    ABS. MONOCYTES 0.4 0.0 - 1.0 K/UL    ABS. EOSINOPHILS 0.1 0.0 - 0.4 K/UL    ABS. BASOPHILS 0.0 0.0 - 0.1 K/UL    ABS. IMM.  GRANS. 0.1 (H) 0.00 - 0.04 K/UL    DF AUTOMATED     GLUCOSE, POC    Collection Time: 10/05/20 12:15 PM   Result Value Ref Range    Glucose (POC) 130 (H) 65 - 100 mg/dL    Performed by Leonel Serrano, POC    Collection Time: 10/05/20  5:03 PM Result Value Ref Range    Glucose (POC) 98 65 - 100 mg/dL    Performed by Jinhomer Hutchison    GLUCOSE, POC    Collection Time: 10/05/20  9:41 PM   Result Value Ref Range    Glucose (POC) 183 (H) 65 - 100 mg/dL    Performed by Zamzam Mar    CBC WITH AUTOMATED DIFF    Collection Time: 10/06/20  4:40 AM   Result Value Ref Range    WBC 10.0 3.6 - 11.0 K/uL    RBC 3.53 (L) 3.80 - 5.20 M/uL    HGB 9.3 (L) 11.5 - 16.0 g/dL    HCT 29.6 (L) 35.0 - 47.0 %    MCV 83.9 80.0 - 99.0 FL    MCH 26.3 26.0 - 34.0 PG    MCHC 31.4 30.0 - 36.5 g/dL    RDW 15.7 (H) 11.5 - 14.5 %    PLATELET 556 522 - 205 K/uL    MPV 9.9 8.9 - 12.9 FL    NRBC 0.0 0  WBC    ABSOLUTE NRBC 0.00 0.00 - 0.01 K/uL    NEUTROPHILS 67 32 - 75 %    LYMPHOCYTES 24 12 - 49 %    MONOCYTES 5 5 - 13 %    EOSINOPHILS 2 0 - 7 %    BASOPHILS 1 0 - 1 %    IMMATURE GRANULOCYTES 1 (H) 0.0 - 0.5 %    ABS. NEUTROPHILS 6.9 1.8 - 8.0 K/UL    ABS. LYMPHOCYTES 2.4 0.8 - 3.5 K/UL    ABS. MONOCYTES 0.5 0.0 - 1.0 K/UL    ABS. EOSINOPHILS 0.2 0.0 - 0.4 K/UL    ABS. BASOPHILS 0.1 0.0 - 0.1 K/UL    ABS. IMM.  GRANS. 0.1 (H) 0.00 - 0.04 K/UL    DF AUTOMATED          Intake/Output Summary (Last 24 hours) at 10/6/2020 0648  Last data filed at 10/6/2020 2231  Gross per 24 hour   Intake 2921.81 ml   Output 3580 ml   Net -658.19 ml       Assessment:  [place] Ratel 37 y.o.    POD # 1 s/p RLTCS at 38w2d    Afebrile, VSS, tolerating pain with medication, good oral intake, adequate urine output    IDDM   On insulin sliding scale     BS's: 98's-183  Plan:  Continue with current management  Whitney out today   Encourage ambulation  May require long acting insulin if BS are not controlled       William Vu MD

## 2020-10-07 VITALS
HEIGHT: 66 IN | DIASTOLIC BLOOD PRESSURE: 79 MMHG | OXYGEN SATURATION: 98 % | HEART RATE: 82 BPM | TEMPERATURE: 98.8 F | SYSTOLIC BLOOD PRESSURE: 118 MMHG | WEIGHT: 263 LBS | RESPIRATION RATE: 16 BRPM | BODY MASS INDEX: 42.27 KG/M2

## 2020-10-07 PROCEDURE — 74011250636 HC RX REV CODE- 250/636: Performed by: SPECIALIST

## 2020-10-07 PROCEDURE — 90471 IMMUNIZATION ADMIN: CPT

## 2020-10-07 PROCEDURE — 90686 IIV4 VACC NO PRSV 0.5 ML IM: CPT | Performed by: SPECIALIST

## 2020-10-07 PROCEDURE — 74011250637 HC RX REV CODE- 250/637: Performed by: SPECIALIST

## 2020-10-07 RX ORDER — IBUPROFEN 800 MG/1
800 TABLET ORAL EVERY 8 HOURS
Qty: 30 TAB | Refills: 4 | Status: SHIPPED | OUTPATIENT
Start: 2020-10-07 | End: 2021-03-22

## 2020-10-07 RX ORDER — OXYCODONE AND ACETAMINOPHEN 5; 325 MG/1; MG/1
1 TABLET ORAL
Qty: 18 TAB | Refills: 0 | Status: SHIPPED | OUTPATIENT
Start: 2020-10-07 | End: 2020-10-10

## 2020-10-07 RX ADMIN — IBUPROFEN 800 MG: 400 TABLET ORAL at 06:29

## 2020-10-07 RX ADMIN — DOCUSATE SODIUM 100 MG: 100 CAPSULE, LIQUID FILLED ORAL at 08:48

## 2020-10-07 RX ADMIN — OXYCODONE HYDROCHLORIDE AND ACETAMINOPHEN 1 TABLET: 5; 325 TABLET ORAL at 01:37

## 2020-10-07 RX ADMIN — INFLUENZA VIRUS VACCINE 0.5 ML: 15; 15; 15; 15 SUSPENSION INTRAMUSCULAR at 09:34

## 2020-10-07 NOTE — ROUTINE PROCESS
Bedside and Verbal shift change report given to NAA Hoang RN & INDU Choi RN (oncoming nurse) by Danilo Tejada RN (offgoing nurse). Report included the following information SBAR, Procedure Summary, Intake/Output and MAR.

## 2020-10-07 NOTE — DISCHARGE INSTRUCTIONS
Patient Education     Patient Education         Section: What to Expect at Home  Your Recovery     A  section, or , is surgery to deliver your baby through a cut that the doctor makes in your lower belly and uterus. The cut is called an incision. You may have some pain in your lower belly and need pain medicine for 1 to 2 weeks. You can expect some vaginal bleeding for several weeks. You will probably need about 6 weeks to fully recover. It's important to take it easy while the incision heals. Avoid heavy lifting, strenuous activities, and exercises that strain the belly muscles while you recover. Ask a family member or friend for help with housework, cooking, and shopping. This care sheet gives you a general idea about how long it will take for you to recover. But each person recovers at a different pace. Follow the steps below to get better as quickly as possible. How can you care for yourself at home? Activity    · Rest when you feel tired. Getting enough sleep will help you recover.     · Try to walk each day. Start by walking a little more than you did the day before. Bit by bit, increase the amount you walk. Walking boosts blood flow and helps prevent pneumonia, constipation, and blood clots.     · Avoid strenuous activities, such as bicycle riding, jogging, weightlifting, and aerobic exercise, for 6 weeks or until your doctor says it is okay.     · Until your doctor says it is okay, do not lift anything heavier than your baby.     · Do not do sit-ups or other exercises that strain the belly muscles for 6 weeks or until your doctor says it is okay.     · Hold a pillow over your incision when you cough or take deep breaths. This will support your belly and decrease your pain.     · You may shower as usual. Pat the incision dry when you are done.     · You will have some vaginal bleeding. Wear sanitary pads.  Do not douche or use tampons until your doctor says it is okay.     · Ask your doctor when you can drive again.     · You will probably need to take at least 6 weeks off work. It depends on the type of work you do and how you feel.     · Ask your doctor when it is okay for you to have sex. Diet    · You can eat your normal diet. If your stomach is upset, try bland, low-fat foods like plain rice, broiled chicken, toast, and yogurt.     · Drink plenty of fluids (unless your doctor tells you not to).     · You may notice that your bowel movements are not regular right after your surgery. This is common. Try to avoid constipation and straining with bowel movements. You may want to take a fiber supplement every day. If you have not had a bowel movement after a couple of days, ask your doctor about taking a mild laxative.     · If you are breastfeeding, limit alcohol. Alcohol can cause a lack of energy and other health problems for the baby when a breastfeeding woman drinks heavily. It can also get in the way of a mom's ability to feed her baby or to care for the child in other ways. There isn't a lot of research about exactly how much alcohol can harm a baby. Having no alcohol is the safest choice for your baby. If you choose to have a drink now and then, have only one drink, and limit the number of occasions that you have a drink. Wait to breastfeed at least 2 hours after you have a drink to reduce the amount of alcohol the baby may get in the milk. Medicines    · Your doctor will tell you if and when you can restart your medicines. He or she will also give you instructions about taking any new medicines.     · If you take aspirin or some other blood thinner, ask your doctor if and when to start taking it again. Make sure that you understand exactly what your doctor wants you to do.     · Take pain medicines exactly as directed. ? If the doctor gave you a prescription medicine for pain, take it as prescribed.   ? If you are not taking a prescription pain medicine, ask your doctor if you can take an over-the-counter medicine.     · If you think your pain medicine is making you sick to your stomach:  ? Take your medicine after meals (unless your doctor has told you not to). ? Ask your doctor for a different pain medicine.     · If your doctor prescribed antibiotics, take them as directed. Do not stop taking them just because you feel better. You need to take the full course of antibiotics. Incision care    · If you have strips of tape on the incision, leave the tape on for a week or until it falls off.     · Wash the area daily with warm, soapy water, and pat it dry. Don't use hydrogen peroxide or alcohol, which can slow healing. You may cover the area with a gauze bandage if it weeps or rubs against clothing. Change the bandage every day.     · Keep the area clean and dry. Other instructions    · If you breastfeed your baby, you may be more comfortable while you are healing if you place the baby so that he or she is not resting on your belly. Try tucking your baby under your arm, with his or her body along the side you will be feeding on. Support your baby's upper body with your arm. With that hand you can control your baby's head to bring his or her mouth to your breast. This is sometimes called the football hold. Follow-up care is a key part of your treatment and safety. Be sure to make and go to all appointments, and call your doctor if you are having problems. It's also a good idea to know your test results and keep a list of the medicines you take. When should you call for help? Call  911 anytime you think you may need emergency care. For example, call if:    · You have thoughts of harming yourself, your baby, or another person.     · You passed out (lost consciousness).     · You have chest pain, are short of breath, or cough up blood.     · You have a seizure.    Call your doctor now or seek immediate medical care if:    · You have pain that does not get better after you take pain medicine.     · You have severe vaginal bleeding.     · You are dizzy or lightheaded, or you feel like you may faint.     · You have new or worse pain in your belly or pelvis.     · You have loose stitches, or your incision comes open.     · You have symptoms of infection, such as:  ? Increased pain, swelling, warmth, or redness. ? Red streaks leading from the incision. ? Pus draining from the incision. ? A fever.     · You have symptoms of a blood clot in your leg (called a deep vein thrombosis), such as:  ? Pain in your calf, back of the knee, thigh, or groin. ? Redness and swelling in your leg or groin.     · You have signs of preeclampsia, such as:  ? Sudden swelling of your face, hands, or feet. ? New vision problems (such as dimness, blurring, or seeing spots). ? A severe headache. Watch closely for changes in your health, and be sure to contact your doctor if:    · You do not get better as expected. Where can you learn more? Go to http://www.carlos.com/  Enter M806 in the search box to learn more about \" Section: What to Expect at Home. \"  Current as of: 2020               Content Version: 12.6  © 2909-0439 [employer]. Care instructions adapted under license by [employer] (which disclaims liability or warranty for this information). If you have questions about a medical condition or this instruction, always ask your healthcare professional. [place] [address] any warranty or liability for your use of this information. After Your Delivery (the Postpartum Period): Care Instructions  Your Care Instructions     Congratulations on the birth of your baby. Like pregnancy, the  period can be a time of excitement, afsaneh, and exhaustion. You may look at your wondrous little baby and feel happy. You may also be overwhelmed by your new sleep hours and new responsibilities.   At first, babies often sleep during the days and are awake at night. They do not have a pattern or routine. They may make sudden gasps, jerk themselves awake, or look like they have crossed eyes. These are all normal, and they may even make you smile. In these first weeks after delivery, try to take good care of yourself. It may take 4 to 6 weeks to feel like yourself again, and possibly longer if you had a  birth. You will likely feel very tired for several weeks. Your days will be full of ups and downs, but lots of afsaneh as well. Follow-up care is a key part of your treatment and safety. Be sure to make and go to all appointments, and call your doctor if you are having problems. It's also a good idea to know your test results and keep a list of the medicines you take. How can you care for yourself at home? Take care of your body after delivery  · Use pads instead of tampons for the bloody flow that may last as long as 2 weeks. · Ease cramps with ibuprofen (Advil, Motrin). · Ease soreness of hemorrhoids and the area between your vagina and rectum with ice compresses or witch hazel pads. · Ease constipation by drinking lots of fluid and eating high-fiber foods. Ask your doctor about over-the-counter stool softeners. · Cleanse yourself with a gentle squeeze of warm water from a bottle instead of wiping with toilet paper. · Take a sitz bath in warm water several times a day. · Wear a good nursing bra. Ease sore and swollen breasts with warm, wet washcloths. · If you are not breastfeeding, use ice rather than heat for breast soreness. · Your period may not start for several months if you are breastfeeding. You may bleed more, and longer at first, than you did before you got pregnant. · Wait until you are healed (about 4 to 6 weeks) before you have sexual intercourse. Your doctor will tell you when it is okay to have sex. · Try not to travel with your baby for 5 or 6 weeks.  If you take a long car trip, make frequent stops to walk around and stretch. Avoid exhaustion  · Rest every day. Try to nap when your baby naps. · Ask another adult to be with you for a few days after delivery. · Plan for  if you have other children. · Stay flexible so you can eat at odd hours and sleep when you need to. Both you and your baby are making new schedules. · Plan small trips to get out of the house. Change can make you feel less tired. · Ask for help with housework, cooking, and shopping. Remind yourself that your job is to care for your baby. Know about help for postpartum depression  · \"Baby blues\" are common for the first 1 to 2 weeks after birth. You may cry or feel sad or irritable for no reason. · Rest whenever you can. Being tired makes it harder to handle your emotions. · Go for walks with your baby. · Talk to your partner, friends, and family about your feelings. · If your symptoms last for more than a few weeks, or if you feel very depressed, ask your doctor for help. · Postpartum depression can be treated. Support groups and counseling can help. Sometimes medicine can also help. Stay healthy  · Eat healthy foods so you have more energy and lose extra baby pounds. · If you breastfeed, avoid drugs. If you quit smoking during pregnancy, try to stay smoke-free. If you choose to have a drink now and then, have only one drink, and limit the number of occasions that you have a drink. Wait to breastfeed at least 2 hours after you have a drink to reduce the amount of alcohol the baby may get in the milk. · Start daily exercise after 4 to 6 weeks, but rest when you feel tired. · Learn exercises to tone your belly. Do Kegel exercises to regain strength in your pelvic muscles. You can do these exercises while you stand or sit. ? Squeeze the same muscles you would use to stop your urine. Your belly and thighs should not move. ? Hold the squeeze for 3 seconds, and then relax for 3 seconds. ? Start with 3 seconds.  Then add 1 second each week until you are able to squeeze for 10 seconds. ? Repeat the exercise 10 to 15 times for each session. Do three or more sessions each day. · Find a class for new mothers and new babies that has an exercise time. · If you had a  birth, give yourself a bit more time before you exercise, and be careful. When should you call for help? Call  911 anytime you think you may need emergency care. For example, call if:    · You have thoughts of harming yourself, your baby, or another person.     · You passed out (lost consciousness).     · You have chest pain, are short of breath, or cough up blood.     · You have a seizure. Call your doctor now or seek immediate medical care if:    · You have severe vaginal bleeding. This means you are passing blood clots and soaking through a pad each hour for 2 or more hours.     · You are dizzy or lightheaded, or you feel like you may faint.     · You have a fever.     · You have new or more belly pain.     · You have signs of a blood clot in your leg (called a deep vein thrombosis), such as:  ? Pain in the calf, back of the knee, thigh, or groin. ? Redness and swelling in your leg or groin.     · You have signs of preeclampsia, such as:  ? Sudden swelling of your face, hands, or feet. ? New vision problems (such as dimness, blurring, or seeing spots). ? A severe headache. Watch closely for changes in your health, and be sure to contact your doctor if:    · Your vaginal bleeding seems to be getting heavier.     · You have new or worse vaginal discharge.     · You feel sad, anxious, or hopeless for more than a few days.     · You do not get better as expected. Where can you learn more? Go to http://www.[employer]com/  Enter A461 in the search box to learn more about \"After Your Delivery (the Postpartum Period): Care Instructions. \"  Current as of: 2020               Content Version: 12.6  © 4984-9429 [employer]. Care instructions adapted under license by [employer] (which disclaims liability or warranty for this information). If you have questions about a medical condition or this instruction, always ask your healthcare professional. [address] any warranty or liability for your use of this information.

## 2020-10-07 NOTE — PROGRESS NOTES
Post-Operative Rounding Note    Wilbert Velarde 37 y.o.      POD # 2 s/p RLTCS at 38w2d for Type II DM    Subjective:  Patient doing well. Has no complaints. Pain is well controlled. Tolerating a regular diet. Lochia appropriate per patient. No nausea, vomiting, fever, chills, CP, SOB, calf pain. Objective:  VS:   Visit Vitals  /71 (BP 1 Location: Left arm, BP Patient Position: Sitting)   Pulse 83   Temp 98.3 °F (36.8 °C)   Resp 18   Ht 5' 6\" (1.676 m)   Wt 119.3 kg (263 lb)   SpO2 98%   Breastfeeding Unknown   BMI 42.45 kg/m²      Gen: NAD  CV: RRR, S1S2  Resp: CTABL  Abd: soft, appropriately TTP, no peritoneal signs, uterine fundus firm below the umbilicus, + BS, non-distended  Inc: DEANGELO's dressing in place.  Minimal shadowing   Ext: warm, dry, nontender, with no edema noted, negative Homans sign     Labs:   Recent Results (from the past 24 hour(s))   GLUCOSE, POC    Collection Time: 10/06/20  7:51 AM   Result Value Ref Range    Glucose (POC) 180 (H) 65 - 100 mg/dL    Performed by Omid Salinas (PCT)    GLUCOSE, POC    Collection Time: 10/06/20 11:48 AM   Result Value Ref Range    Glucose (POC) 173 (H) 65 - 100 mg/dL    Performed by Omid Salinas (PCT)    GLUCOSE, POC    Collection Time: 10/06/20  5:12 PM   Result Value Ref Range    Glucose (POC) 162 (H) 65 - 100 mg/dL    Performed by Tea Womack    GLUCOSE, POC    Collection Time: 10/06/20 10:22 PM   Result Value Ref Range    Glucose (POC) 132 (H) 65 - 100 mg/dL    Performed by [employer]         Assessment:  Wilbert Velarde 37 y.o.    POD # 2 s/p RLTCS at 38w2d    Afebrile, VSS, tolerating pain with medication, good oral intake, adequate urine output    IDDM   On insulin sliding scale     BS's: 132-183   On pump at home   Plan:  DC home today  Plans to call her endocrinologist today   Will return to pre-pregnancy insulin regimen   Return to office in one week for DEANGELO's removal   Planing Antonio [address], MD

## 2020-10-07 NOTE — PROGRESS NOTES
Unable to determine appropriate insulin dose, patient ate breakfast before BG could be checked. Patient states that other than breakfast she had not eaten since her last BG check at 2222, when BG was 132. Insulin dose held.

## 2020-10-17 NOTE — DISCHARGE SUMMARY
Obstetrical Discharge Summary     Name: Adrian Granados MRN: [id number]  SSN: [id number]    YOB: 1985  Age: 38 y.o. Sex: female      Allergies: Mirapex [pramipexole]; Morphine; and Tramadol    Admit Date: 10/5/2020    Discharge Date: 10/16/2020     Admitting Physician: Bia Peterson MD     Attending Physician:  No att. providers found     * Admission Diagnoses: IUP (intrauterine pregnancy), incidental [Z33.1]       * Discharge Diagnoses:   Information for the patient's :  William Jaime [[id number]]   Delivery of a 3.79 kg female infant via , Low Transverse on 10/5/2020 at 9:38 AM  by Bia Peterson. Apgars were 9  and 9 . Additional Diagnoses:   Hospital Problems as of 10/7/2020 Date Reviewed: 10/5/2020          Codes Class Noted - Resolved POA    IUP (intrauterine pregnancy), incidental ICD-10-CM: Z33.1  ICD-9-CM: V22.2  10/5/2020 - Present Unknown             Lab Results   Component Value Date/Time    ABO/Rh(D) B POSITIVE 10/05/2020 06:25 AM    Rubella, External immune 2020    GrBStrep, External pos 2020    ABO,Rh B+ 2016      Immunization History   Administered Date(s) Administered    Influenza Vaccine 10/01/2016    Influenza Vaccine (Quad) PF (>6 Mo Flulaval, Fluarix, and >3 Yrs Ilan, Fluzone 28209) 10/07/2020    MMR 2017    Tdap 2016       * Procedures: Repeat  section     * Discharge Condition: stable    * Hospital Course: Normal hospital course following the delivery. * Disposition: Home    Discharge Medications:   Discharge Medication List as of 10/7/2020 10:00 AM      START taking these medications    Details   ibuprofen (MOTRIN) 800 mg tablet Take 1 Tab by mouth every eight (8) hours. , Normal, Disp-30 Tab,R-4      oxyCODONE-acetaminophen (PERCOCET) 5-325 mg per tablet Take 1 Tab by mouth every four (4) hours as needed for Pain for up to 3 days.  Max Daily Amount: 6 Tabs., Normal, Disp-18 Tab,R-0 CONTINUE these medications which have NOT CHANGED    Details   labetaloL (NORMODYNE) 100 mg tablet Take 100 mg by mouth two (2) times a day., Historical Med      docusate sodium (COLACE) 50 mg capsule Take 50 mg by mouth two (2) times a day., Historical Med      ferrous sulfate (Iron) 325 mg (65 mg iron) tablet Take  by mouth Daily (before breakfast). , Historical Med      aspirin delayed-release 81 mg tablet Take  by mouth daily. , Historical Med      cholecalciferol, vitamin D3, (Vitamin D3) 50 mcg (2,000 unit) tab Take 2,000 mcg by mouth daily. , Historical Med      PNV Comb #2-Iron-FA-Omega 3 29-1-400 mg cmpk Take  by mouth., Historical Med      metFORMIN (GLUCOPHAGE) 500 mg tablet TAKE 1 TAB BY MOUTH TWO (2) TIMES DAILY (WITH MEALS). , Normal, Disp-60 Tab,R-10      glucose blood VI test strips (ASCENSIA AUTODISC VI, ONE TOUCH ULTRA TEST VI) strip May check blood sugar daily, true matrix strips, Normal, Disp-50 Strip, R-5      Blood-Glucose Meter monitoring kit Use up to three times per days, Normal, Disp-1 Kit, R-0Whichever Ins will cover      albuterol (VENTOLIN HFA) 90 mcg/actuation inhaler TAKE TWO PUFFS BY INHALATION EVERY FOUR HOURS AS NEEDED FOR WHEEZING., Normal, Disp-1 Inhaler, R-5      albuterol (PROVENTIL VENTOLIN) 2.5 mg /3 mL (0.083 %) nebu 3 mL by Nebulization route every four (4) hours as needed (wheezing). , Normal, Disp-100 Each, R-5      azithromycin (ZITHROMAX) 250 mg tablet Take 1 Tab by mouth See Admin Instructions. Take two tablets today then one tablet daily for next 4 days, Normal, Disp-6 Tab, R-0      diclofenac EC (VOLTAREN) 50 mg EC tablet Take 1 Tab by mouth two (2) times a day., Normal, Disp-60 Tab, R-2      pioglitazone (ACTOS) 30 mg tablet Take 1 Tab by mouth daily. , Normal, Disp-30 Tab, R-5      norethindrone-ethinyl estradiol (ORTHO-NOVUM 1-35 TAB, NORTREL 1-35 TAB) 1-35 mg-mcg tab Take 1 Tab by mouth daily. , Normal, Disp-1 Dose Pack, R-5             * Follow-up Care/Patient Instructions: Activity: Activity as tolerated, no driving while on narcotics.  No sex for 6 weeks   Diet:  DM Diet   Wound Care: Keep wound clean and dry    Follow-up Information     Follow up With Specialties Details Why Contact Info    Ronit Mcwilliams, [address] [facility]  [phone]      Wayne Perez MD Obstetrics & Gynecology, Gynecology, Obstetrics In 3 days  [facility] [place] [phone]

## 2021-03-22 ENCOUNTER — VIRTUAL VISIT (OUTPATIENT)
Dept: INTERNAL MEDICINE CLINIC | Age: 36
End: 2021-03-22
Payer: MEDICAID

## 2021-03-22 DIAGNOSIS — J45.20 MILD INTERMITTENT ASTHMA WITHOUT COMPLICATION: ICD-10-CM

## 2021-03-22 DIAGNOSIS — J34.89 SINUS PAIN: ICD-10-CM

## 2021-03-22 DIAGNOSIS — K08.89 PAIN, DENTAL: Primary | ICD-10-CM

## 2021-03-22 PROCEDURE — 99214 OFFICE O/P EST MOD 30 MIN: CPT | Performed by: NURSE PRACTITIONER

## 2021-03-22 RX ORDER — NAPROXEN 500 MG/1
500 TABLET ORAL 2 TIMES DAILY WITH MEALS
Qty: 30 TAB | Refills: 0 | Status: SHIPPED | OUTPATIENT
Start: 2021-03-22

## 2021-03-22 RX ORDER — DOXYCYCLINE 100 MG/1
100 TABLET ORAL 2 TIMES DAILY
COMMUNITY
Start: 2021-03-21 | End: 2021-03-24 | Stop reason: ALTCHOICE

## 2021-03-22 NOTE — PROGRESS NOTES
Was seen by synchronous (real-time) audio-video technology on 3/22/2021 for:  Chief Complaint   Patient presents with    Cough     chest congestion - went to the ER last night - headache and swollen nose - jaw pain - on antibiotic [facility] - 99.0 oral temp last night    Sinus Pain     since March 4, 2021 - was treated with Amoxicillin prior to this time from Urgent Care      Subjective:   Elina Otoole is a 38 y.o. female who recently went to Urgent Care March 4th for cough and sinus discomfort and was prescribed Amoxicillin. Returned to ER last night due to an allergic reaction. Stated  \"my face swelled up. \"   They discontinued Amoxicillin and gave her Doxycycline. Hx asthma. Has been in pain x  3 days,  Stated she has not been able to eat or drink anything. Pt reported that Ibuprofen makes her bleed. Most recent dentist apt was 2020. Tested Covid negative at Urgent Care March 4, 2021. Prior to Admission medications    Medication Sig Start Date End Date Taking? Authorizing Provider   doxycycline (ADOXA) 100 mg tablet Take 100 mg by mouth two (2) times a day. 3/21/21 3/31/21 Yes Provider, Historical   labetaloL (NORMODYNE) 100 mg tablet Take 100 mg by mouth two (2) times a day. Yes Provider, Historical   docusate sodium (COLACE) 50 mg capsule Take 50 mg by mouth two (2) times a day. Yes Provider, Historical   ferrous sulfate (Iron) 325 mg (65 mg iron) tablet Take  by mouth Daily (before breakfast). Yes Provider, Historical   aspirin delayed-release 81 mg tablet Take  by mouth daily. Yes Provider, Historical   cholecalciferol, vitamin D3, (Vitamin D3) 50 mcg (2,000 unit) tab Take 2,000 mcg by mouth daily. Yes Provider, Historical   PNV Comb #2-Iron-FA-Omega 3 29-1-400 mg cmpk Take  by mouth. Yes Provider, Historical   metFORMIN (GLUCOPHAGE) 500 mg tablet TAKE 1 TAB BY MOUTH TWO (2) TIMES DAILY (WITH MEALS). Patient taking differently: Take 500 mg by mouth daily (with breakfast).  4/17/20 Yes Kaela Akins MD   albuterol (VENTOLIN HFA) 90 mcg/actuation inhaler TAKE TWO PUFFS BY INHALATION EVERY FOUR HOURS AS NEEDED FOR WHEEZING. 10/7/19  Yes Kaela Akins MD   albuterol (PROVENTIL VENTOLIN) 2.5 mg /3 mL (0.083 %) nebu 3 mL by Nebulization route every four (4) hours as needed (wheezing). 10/7/19  Yes Kaela Akins MD   diclofenac EC (VOLTAREN) 50 mg EC tablet Take 1 Tab by mouth two (2) times a day. 19  Yes Kaela Akins MD   pioglitazone (ACTOS) 30 mg tablet Take 1 Tab by mouth daily. 19  Yes Kaela Akins MD   norethindrone-ethinyl estradiol (ORTHO-NOVUM 1-35 TAB, NORTREL 1-35 TAB) 1-35 mg-mcg tab Take 1 Tab by mouth daily. 18  Yes Kaela Akins MD   ibuprofen (MOTRIN) 800 mg tablet Take 1 Tab by mouth every eight (8) hours. 10/7/20   Luna Pop MD   glucose blood VI test strips (ASCENSIA AUTODISC VI, ONE TOUCH ULTRA TEST VI) strip May check blood sugar daily, true matrix strips 20   Kaela Akins MD   Blood-Glucose Meter monitoring kit Use up to three times per days 20   Kaela Akins MD   azithromycin (ZITHROMAX) 250 mg tablet Take 1 Tab by mouth See Admin Instructions.  Take two tablets today then one tablet daily for next 4 days 10/7/19   Kaela Akins MD     Allergies   Allergen Reactions    Mirapex [Pramipexole] Drowsiness    Morphine Shortness of Breath and Rash    Tramadol Other (comments)     headache     Past Medical History:   Diagnosis Date    Abnormal Papanicolaou smear of cervix     HPV    Anemia     Asthma     Diabetes ([address]     Hypertension     Phlebitis and thrombophlebitis      Past Surgical History:   Procedure Laterality Date    HX  SECTION  2017    HX TYMPANOSTOMY       Family History   Problem Relation Age of Onset    Diabetes Father     Heart Disease Father         CAD    Hypertension Father     Diabetes Mother     Hypertension Mother     Liver Disease Mother     Deep Vein Thrombosis Mother     Pulmonary Embolism Mother     Hypertension Sister     Deep Vein Thrombosis Sister     Pulmonary Embolism Sister     Diabetes Maternal Uncle     Cancer Maternal Grandmother         Lung Cancer     Review of Systems   Constitutional: Positive for chills and fever (99.2). HENT: Positive for sinus pain. Respiratory: Positive for cough and shortness of breath. Neurological: Positive for dizziness and headaches. Objective:   No flowsheet data found. [INSTRUCTIONS:  \"[x]\" Indicates a positive item  \"[]\" Indicates a negative item  -- DELETE ALL ITEMS NOT EXAMINED]    Constitutional: [x] Appears well-developed and well-nourished [x] No apparent distress      [] Abnormal -     Mental status: [x] Alert and awake  [x] Oriented to person/place/time [x] Able to follow commands    [] Abnormal -     Eyes:   EOM    []  Normal    [] Abnormal -   Sclera  [x]  Normal    [] Abnormal -          Discharge [x]  None visible   [] Abnormal -     HENT: [x] Normocephalic, atraumatic  [] Abnormal -   [x] Mouth/Throat: Mucous membranes are moist    External Ears [x] Normal  [] Abnormal -    Neck: [x] No visualized mass [] Abnormal -     Pulmonary/Chest: [x] Respiratory effort normal   [x] No visualized signs of difficulty breathing or respiratory distress        [] Abnormal -      Musculoskeletal:   [] Normal gait with no signs of ataxia         [x] Normal range of motion of neck        [] Abnormal -     Neurological:        [x] No Facial Asymmetry (Cranial nerve 7 motor function) (limited exam due to video visit)          [x] No gaze palsy        [] Abnormal -          Skin:        [x] No significant exanthematous lesions or discoloration noted on facial skin         [] Abnormal -            Psychiatric:       [x] Normal Affect [] Abnormal -        [x] No Hallucinations    Assessment & Plan:   The complexity of medical decision making for this visit is moderate       ICD-10-CM ICD-9-CM    1.  Pain, dental  K08.89 525.9 naproxen (NAPROSYN) 500 mg tablet   2. Mild intermittent asthma without complication  L83.53 267.76    3. Sinus pain  J34.89 478.19      1. Pain, dental  - naproxen (NAPROSYN) 500 mg tablet; Take 1 Tab by mouth two (2) times daily (with meals). Take for 5 days then re-evaluate  Indications: pain  Dispense: 30 Tab; Refill: 0    2. Mild intermittent asthma without complication    3. Sinus pain  Saline rinses. Finish Doxycycline course. Call dentist tomorrow and make apt. Naproxen 500mg BID. Use inhaler PRN for SOB with asthma. Discussed could not prescribe a narcotic during a virtual visit. She would need to make an office apt for that. Follow up in 2 weeks. I spent at least 25 minutes on this visit with this established patient. We discussed the expected course, resolution and complications of the diagnosis(es) in detail. Medication risks, benefits, costs, interactions, and alternatives were discussed as indicated. I advised her to contact the office if her condition worsens, changes or fails to improve as anticipated. She expressed understanding with the diagnosis(es) and plan. Roxy Cabot, was evaluated through a synchronous (real-time) audio-video encounter. The patient (or guardian if applicable) is aware that this is a billable service. Verbal consent to proceed has been obtained within the past 12 months. The visit was conducted pursuant to the emergency declaration under the [facility], [address] [place] [address] authority and the [employer] General Act. Patient identification was verified, and a caregiver was present when appropriate. The patient was located in a state where the provider was credentialed to provide care. I was in the office and pt was at home during visit. If symptoms worsen and  necessary, call 911 or go to nearest ER.      Zahira Knox NP

## 2021-03-22 NOTE — PROGRESS NOTES
Chief Complaint   Patient presents with    Cough     chest congestion - went to the ER last night - headache and swollen nose - jaw pain - on antibiotic [facility] - 99.0 oral temp last night    Sinus Pain     since March 4, 2021 - was treated with Amoxicillin prior to this time from Urgent Care

## 2021-03-23 LAB — HBA1C MFR BLD HPLC: 13.1 %

## 2021-03-24 ENCOUNTER — VIRTUAL VISIT (OUTPATIENT)
Dept: INTERNAL MEDICINE CLINIC | Age: 36
End: 2021-03-24
Payer: MEDICAID

## 2021-03-24 DIAGNOSIS — E11.65 TYPE 2 DIABETES MELLITUS WITH HYPERGLYCEMIA, WITH LONG-TERM CURRENT USE OF INSULIN (HCC): Primary | ICD-10-CM

## 2021-03-24 DIAGNOSIS — J45.20 MILD INTERMITTENT ASTHMA WITHOUT COMPLICATION: ICD-10-CM

## 2021-03-24 DIAGNOSIS — I10 ESSENTIAL HYPERTENSION: ICD-10-CM

## 2021-03-24 DIAGNOSIS — Z79.4 TYPE 2 DIABETES MELLITUS WITH HYPERGLYCEMIA, WITH LONG-TERM CURRENT USE OF INSULIN (HCC): Primary | ICD-10-CM

## 2021-03-24 PROCEDURE — 99214 OFFICE O/P EST MOD 30 MIN: CPT | Performed by: FAMILY MEDICINE

## 2021-03-24 RX ORDER — PEN NEEDLE, DIABETIC 30 GX3/16"
NEEDLE, DISPOSABLE MISCELLANEOUS
Qty: 1 PACKAGE | Refills: 11 | Status: SHIPPED | OUTPATIENT
Start: 2021-03-24

## 2021-03-24 RX ORDER — HYDROCODONE BITARTRATE AND ACETAMINOPHEN 5; 325 MG/1; MG/1
1 TABLET ORAL
COMMUNITY

## 2021-03-24 RX ORDER — INSULIN LISPRO 100 [IU]/ML
INJECTION, SOLUTION INTRAVENOUS; SUBCUTANEOUS
COMMUNITY
End: 2021-03-24 | Stop reason: ALTCHOICE

## 2021-03-24 RX ORDER — CLINDAMYCIN HYDROCHLORIDE 300 MG/1
300 CAPSULE ORAL 3 TIMES DAILY
COMMUNITY

## 2021-03-24 NOTE — PROGRESS NOTES
Chief Complaint   Patient presents with    Medication Refill     ER FU       Patient is aware that this is a Virtual Visit or Phone Call Only doctor's visit. Patient has not been out of the country in (14 months), NO diarrhea, NO cough, NO chest conjestion, NO temp. Pt has not been around anyone with these symptoms. Health Maintenance reviewed. I have reviewed the patient's medical history in detail and updated the computerized patient record. 1. Have you been to the ER, urgent care clinic since your last visit? Hospitalized since your last visit? yes    2. Have you seen or consulted any other health care providers outside of the [address] [place] [address] since your last visit? Include any pap smears or colon screening. Yes      Encouraged pt to discuss pt's wishes with spouse/partner/family and bring them in the next appt to follow thru with the Advanced Directive      Fall Risk Assessment, last 12 mths 4/3/2019   Able to walk? Yes   Fall in past 12 months? No       3 most recent PHQ Screens 3/24/2021   Little interest or pleasure in doing things Several days   Feeling down, depressed, irritable, or hopeless Several days   Total Score PHQ 2 2       Abuse Screening Questionnaire 3/24/2021   Do you ever feel afraid of your partner? N   Are you in a relationship with someone who physically or mentally threatens you? N   Is it safe for you to go home?  Y       ADL Assessment 3/24/2021   Feeding yourself No Help Needed   Getting from bed to chair No Help Needed   Getting dressed No Help Needed   Bathing or showering No Help Needed   Walk across the room (includes cane/walker) No Help Needed   Using the telphone No Help Needed   Taking your medications No Help Needed   Preparing meals No Help Needed   Managing money (expenses/bills) No Help Needed   Moderately strenuous housework (laundry) No Help Needed   Shopping for personal items (toiletries/medicines) No Help Needed   Shopping for groceries No Help Needed   Driving No Help Needed   Climbing a flight of stairs No Help Needed   Getting to places beyond walking distances -

## 2021-06-16 LAB — HBA1C MFR BLD HPLC: 13.1 %

## 2022-03-19 PROBLEM — Z33.1 IUP (INTRAUTERINE PREGNANCY), INCIDENTAL: Status: ACTIVE | Noted: 2020-10-05

## 2022-03-20 PROBLEM — E66.01 OBESITY, MORBID (HCC): Status: ACTIVE | Noted: 2018-03-26

## 2022-10-24 NOTE — ED PROVIDER NOTES
The history is provided by the patient. No  was used. Edu Santana is a 33 y.o. female who presents ambulatory with family member to [facility] ED, with PMH of DM, HTN, and asthma, c/o B nose epistaxis x  each day for past x 3 days \"in a row\". Patient is 28 weeks pregnant. Patient has been coughing x 1 week and using neb machine at home (no other medications or OTC meds for cough). Patient has had nasal congestion and ABD \"soreness\" possibly due to coughing so frequently and hard. Other than the neb treatments, patient has tried to cut down the heat in her home to prevent any increased dryness in nose. Patient was eval yesterday at [facility] for same c/o. Patient denies fever/chills, URI s/s, n/v, ABD pain, vag cramping or discharge, rashes. Patient advises hx DM prior to pregnancy; no hx blood thinners; no hx nosebleeds. Patient with no other specific c/o or concerns today. PCP: Dulce Orantes MD  Allergies: mirapex, morphine, tramadol    There are no other complaints, changes or physical findings at this time.   Past Medical History:   Diagnosis Date    Asthma     Diabetes ([address]     Hypertension        Past Surgical History:   Procedure Laterality Date    Hx tympanostomy           Family History:   Problem Relation Age of Onset    Diabetes Father     Heart Disease Father      CAD    Hypertension Father     Diabetes Mother     Hypertension Mother     Liver Disease Mother     Deep Vein Thrombosis Mother     Pulmonary Embolism Mother     Hypertension Sister     Deep Vein Thrombosis Sister     Pulmonary Embolism Sister     Diabetes Maternal Uncle     Cancer Maternal Grandmother      Lung Cancer       Social History     Social History    Marital status: SINGLE     Spouse name: N/A    Number of children: 0    Years of education: N/A     Occupational History    disabled      Social History Main Topics    Smoking status: Former Smoker     Packs/day: 0.10     Start date: 3/23/2004    Smokeless tobacco: Never Used    Alcohol use No    Drug use: No    Sexual activity: Yes     Partners: Male     Other Topics Concern    Not on file     Social History Narrative    Lives with fikulwant interested in pregnany         ALLERGIES: Mirapex [pramipexole]; Morphine; and Tramadol    Review of Systems   Constitutional: Negative for chills and fever. HENT: Positive for congestion and nosebleeds. Negative for ear pain, rhinorrhea and sore throat. Respiratory: Positive for cough. Gastrointestinal: Negative for abdominal pain, nausea and vomiting. ABD \"soreness\" due to coughing   Genitourinary: Negative for dysuria, vaginal bleeding and vaginal discharge. 28 weeks pregnant   Skin: Negative for rash. All other systems reviewed and are negative. Vitals:    01/04/17 1306 01/04/17 1348   BP: 198/77 141/80   Pulse: 87 97   Resp: 16 16   Temp: 97.6 °F (36.4 °C)    SpO2: 98% 97%   Weight: 115.1 kg (253 lb 12 oz)    Height: 5' 6\" (1.676 m)             Physical Exam   Constitutional: She is oriented to person, place, and time. She appears well-developed and well-nourished. No distress. HENT:   Head: Normocephalic and atraumatic. Right Ear: External ear normal.   Left Ear: External ear normal.   Nose: Rhinorrhea present. Right sinus exhibits no maxillary sinus tenderness and no frontal sinus tenderness. Left sinus exhibits no maxillary sinus tenderness and no frontal sinus tenderness. Mouth/Throat: Oropharynx is clear and moist. No oropharyngeal exudate, posterior oropharyngeal edema or posterior oropharyngeal erythema. Blood tinged rhinorrhea. Eyes: Conjunctivae are normal. Right eye exhibits no discharge. Left eye exhibits no discharge. Neck: Normal range of motion. Neck supple. Cardiovascular: Normal rate, regular rhythm and normal heart sounds. No murmur heard. Pulmonary/Chest: Effort normal and breath sounds normal. No respiratory distress.  She has no wheezes. Non-productive cough. Abdominal:   Gravid. Lymphadenopathy:     She has no cervical adenopathy. Neurological: She is alert and oriented to person, place, and time. Skin: Skin is warm and dry. She is not diaphoretic. Psychiatric: She has a normal mood and affect. Her behavior is normal.   Nursing note and vitals reviewed. MDM  Number of Diagnoses or Management Options  Acute bronchitis, unspecified organism:   Nosebleed:   Diagnosis management comments: DDx: pregnancy, asthma exacerbation, bronchitis, PNA, URI, epistaxis. ED Course       Procedures     DISCHARGE NOTE:  4:38 PM   The care plan has been outline with the patient and/or family, who verbally conveyed understanding and agreement. Available results have been reviewed. Patient and/or family understand the follow up plan as outlined and discharge instructions. Should their condition deterioration at any time after discharge the patient agrees to return, follow up sooner than outlined or seek medical assistance at the closest Emergency Room as soon as possible. Questions have been answered. Discharge instructions and educational information regarding the patient's diagnosis as well a list of reasons why the patient would want to seek immediate medical attention, should their condition change, were reviewed directly with the patient/family       Medications   albuterol-ipratropium (DUO-NEB) 2.5 MG-0.5 MG/3 ML (3 mL Nebulization Given 1/4/17 4992)       CLINICAL IMPRESSION:  1. Acute bronchitis, unspecified organism    2. Nosebleed        Plan:  1. Use humidifier at bedside  2. May apply water based lubricants to inside of nares  3. Pinch nose when coughing  4. Continue inhaler use  5.  When using neb machine, try to avoid putting the mask over the nose; try to place mask over the mouth  Current Discharge Medication List      CONTINUE these medications which have NOT CHANGED    Details   diphenoxylate-atropine (LOMOTIL) 2.5-0.025 mg per tablet Take 1 Tab by mouth four (4) times daily as needed for Diarrhea. Max Daily Amount: 4 Tabs. Qty: 20 Tab, Refills: 0      SITagliptin (JANUVIA) 100 mg tablet Take 1 Tab by mouth daily. Appointment needed to refill this medication again. Qty: 30 Tab, Refills: 5      glyBURIDE (DIABETA) 1.25 mg tablet Take 1/2 pill if you have an evening snack  Qty: 30 Tab, Refills: 3      labetalol (NORMODYNE) 100 mg tablet Take 100 mg by mouth two (2) times a day. PRENATAL VIT#96/FERROUS FUM/FA (PRENATAL VITAMIN) 27 mg iron- 800 mcg tab tablet daily. aspirin 81 mg chewable tablet Take 1 Tab by mouth daily. Qty: 30 Tab, Refills: 5      albuterol (PROAIR HFA) 90 mcg/actuation inhaler Take 2 Puffs by inhalation every four (4) hours as needed for Wheezing. Qty: 1 Inhaler, Refills: 5      metFORMIN (GLUCOPHAGE) 1,000 mg tablet Take 1 Tab by mouth two (2) times a day. Qty: 60 Tab, Refills: 5      Lancets misc Use up to three times per days;  Qty: 1 Each, Refills: 11      glucose blood VI test strips (ASCENSIA AUTODISC VI, ONE TOUCH ULTRA TEST VI) strip Use up to four times daily  Qty: 100 Strip, Refills: 5      Blood-Glucose Meter monitoring kit Use up to three times per days  Qty: 1 Kit, Refills: 0      PEN NEEDLE 31 X 5/16 \" ndle Refills: 0           Follow-up Information     None        Return to the closest emergency room or follow up sooner for any deterioration  Patient's condition is stable and patient is ready to go home. This note is prepared by Estrellita Alegre, acting as Scribe for ALENA Rossi PA-C: The scribe's documentation has been prepared under my direction and personally reviewed by me in its entirety. I confirm that the note above accurately reflects all work, treatment, procedures, and medical decision making performed by me. Detail Level: Zone General Sunscreen Counseling: I recommended a broad spectrum sunscreen with a SPF of 30 or higher.  I explained that SPF 30 sunscreens block approximately 97 percent of the sun's harmful rays.  Sunscreens should be applied at least 15 minutes prior to expected sun exposure and then every 2 hours after that as long as sun exposure continues. If swimming or exercising sunscreen should be reapplied every 45 minutes to an hour after getting wet or sweating.  One ounce, or the equivalent of a shot glass full of sunscreen, is adequate to protect the skin not covered by a bathing suit. I also recommended a lip balm with a sunscreen as well. Sun protective clothing can be used in lieu of sunscreen but must be worn the entire time you are exposed to the sun's rays.

## 2022-11-14 LAB
ABO, EXTERNAL RESULT: NORMAL
HEP B, EXTERNAL RESULT: NORMAL
HIV, EXTERNAL RESULT: NON REACTIVE
RH FACTOR, EXTERNAL RESULT: NORMAL
RPR, EXTERNAL RESULT: NON REACTIVE
RUBELLA TITER, EXTERNAL RESULT: NORMAL

## 2023-05-10 RX ORDER — ASPIRIN 81 MG/1
TABLET ORAL DAILY
COMMUNITY

## 2023-05-10 RX ORDER — HYDROCODONE BITARTRATE AND ACETAMINOPHEN 5; 325 MG/1; MG/1
1 TABLET ORAL EVERY 6 HOURS PRN
COMMUNITY

## 2023-05-10 RX ORDER — CLINDAMYCIN HYDROCHLORIDE 300 MG/1
CAPSULE ORAL 3 TIMES DAILY
Status: ON HOLD | COMMUNITY
End: 2023-06-10 | Stop reason: HOSPADM

## 2023-05-10 RX ORDER — ALBUTEROL SULFATE 2.5 MG/3ML
SOLUTION RESPIRATORY (INHALATION) EVERY 4 HOURS PRN
COMMUNITY
Start: 2019-10-07

## 2023-05-10 RX ORDER — ALBUTEROL SULFATE 90 UG/1
AEROSOL, METERED RESPIRATORY (INHALATION)
COMMUNITY
Start: 2019-10-07

## 2023-05-10 RX ORDER — LABETALOL 100 MG/1
TABLET, FILM COATED ORAL 2 TIMES DAILY
COMMUNITY

## 2023-05-10 RX ORDER — PIOGLITAZONEHYDROCHLORIDE 30 MG/1
TABLET ORAL DAILY
COMMUNITY
Start: 2019-08-19

## 2023-05-10 RX ORDER — FERROUS SULFATE 325(65) MG
TABLET ORAL
COMMUNITY

## 2023-05-10 RX ORDER — NAPROXEN 500 MG/1
TABLET ORAL 2 TIMES DAILY WITH MEALS
Status: ON HOLD | COMMUNITY
Start: 2021-03-22 | End: 2023-06-10 | Stop reason: HOSPADM

## 2023-06-08 ENCOUNTER — HOSPITAL ENCOUNTER (INPATIENT)
Facility: HOSPITAL | Age: 38
LOS: 2 days | Discharge: HOME | End: 2023-06-10
Attending: SPECIALIST | Admitting: SPECIALIST
Payer: MEDICAID

## 2023-06-08 ENCOUNTER — ANESTHESIA EVENT (OUTPATIENT)
Dept: LABOR AND DELIVERY | Facility: HOSPITAL | Age: 38
End: 2023-06-08
Payer: MEDICAID

## 2023-06-08 ENCOUNTER — ANESTHESIA (OUTPATIENT)
Dept: LABOR AND DELIVERY | Facility: HOSPITAL | Age: 38
End: 2023-06-08
Payer: MEDICAID

## 2023-06-08 DIAGNOSIS — E66.01 OBESITY, MORBID (HCC): Primary | ICD-10-CM

## 2023-06-08 LAB
ABO + RH BLD: NORMAL
ALBUMIN SERPL-MCNC: 2.5 G/DL (ref 3.5–5)
ALBUMIN/GLOB SERPL: 0.6 (ref 1.1–2.2)
ALP SERPL-CCNC: 157 U/L (ref 45–117)
ALT SERPL-CCNC: 58 U/L (ref 12–78)
AMPHET UR QL SCN: NEGATIVE
ANION GAP SERPL CALC-SCNC: 7 MMOL/L (ref 5–15)
AST SERPL-CCNC: 23 U/L (ref 15–37)
BARBITURATES UR QL SCN: NEGATIVE
BASOPHILS # BLD: 0.1 K/UL (ref 0–0.1)
BASOPHILS NFR BLD: 0 % (ref 0–1)
BENZODIAZ UR QL: NEGATIVE
BILIRUB SERPL-MCNC: 0.5 MG/DL (ref 0.2–1)
BLOOD GROUP ANTIBODIES SERPL: NORMAL
BUN SERPL-MCNC: 5 MG/DL (ref 6–20)
BUN/CREAT SERPL: 10 (ref 12–20)
CALCIUM SERPL-MCNC: 8.7 MG/DL (ref 8.5–10.1)
CANNABINOIDS UR QL SCN: POSITIVE
CHLORIDE SERPL-SCNC: 107 MMOL/L (ref 97–108)
CO2 SERPL-SCNC: 24 MMOL/L (ref 21–32)
COCAINE UR QL SCN: NEGATIVE
CREAT SERPL-MCNC: 0.52 MG/DL (ref 0.55–1.02)
DIFFERENTIAL METHOD BLD: ABNORMAL
EOSINOPHIL # BLD: 0.1 K/UL (ref 0–0.4)
EOSINOPHIL NFR BLD: 0 % (ref 0–7)
ERYTHROCYTE [DISTWIDTH] IN BLOOD BY AUTOMATED COUNT: 14.6 % (ref 11.5–14.5)
ERYTHROCYTE [DISTWIDTH] IN BLOOD BY AUTOMATED COUNT: 14.6 % (ref 11.5–14.5)
GLOBULIN SER CALC-MCNC: 4.4 G/DL (ref 2–4)
GLUCOSE BLD STRIP.AUTO-MCNC: 103 MG/DL (ref 65–117)
GLUCOSE BLD STRIP.AUTO-MCNC: 120 MG/DL (ref 65–117)
GLUCOSE SERPL-MCNC: 142 MG/DL (ref 65–100)
HCT VFR BLD AUTO: 34.4 % (ref 35–47)
HCT VFR BLD AUTO: 36.3 % (ref 35–47)
HGB BLD-MCNC: 10.7 G/DL (ref 11.5–16)
HGB BLD-MCNC: 11.6 G/DL (ref 11.5–16)
IMM GRANULOCYTES # BLD AUTO: 0.1 K/UL (ref 0–0.04)
IMM GRANULOCYTES NFR BLD AUTO: 1 % (ref 0–0.5)
LYMPHOCYTES # BLD: 2.5 K/UL (ref 0.8–3.5)
LYMPHOCYTES NFR BLD: 16 % (ref 12–49)
Lab: ABNORMAL
MCH RBC QN AUTO: 26.2 PG (ref 26–34)
MCH RBC QN AUTO: 26.7 PG (ref 26–34)
MCHC RBC AUTO-ENTMCNC: 31.1 G/DL (ref 30–36.5)
MCHC RBC AUTO-ENTMCNC: 32 G/DL (ref 30–36.5)
MCV RBC AUTO: 83.6 FL (ref 80–99)
MCV RBC AUTO: 84.1 FL (ref 80–99)
METHADONE UR QL: NEGATIVE
MONOCYTES # BLD: 0.6 K/UL (ref 0–1)
MONOCYTES NFR BLD: 4 % (ref 5–13)
NEUTS SEG # BLD: 12.5 K/UL (ref 1.8–8)
NEUTS SEG NFR BLD: 79 % (ref 32–75)
NRBC # BLD: 0 K/UL (ref 0–0.01)
NRBC # BLD: 0 K/UL (ref 0–0.01)
NRBC BLD-RTO: 0 PER 100 WBC
NRBC BLD-RTO: 0 PER 100 WBC
OPIATES UR QL: NEGATIVE
PCP UR QL: NEGATIVE
PLATELET # BLD AUTO: 288 K/UL (ref 150–400)
PLATELET # BLD AUTO: 324 K/UL (ref 150–400)
PMV BLD AUTO: 10.3 FL (ref 8.9–12.9)
PMV BLD AUTO: 10.4 FL (ref 8.9–12.9)
POTASSIUM SERPL-SCNC: 3.9 MMOL/L (ref 3.5–5.1)
PROT SERPL-MCNC: 6.9 G/DL (ref 6.4–8.2)
RBC # BLD AUTO: 4.09 M/UL (ref 3.8–5.2)
RBC # BLD AUTO: 4.34 M/UL (ref 3.8–5.2)
SERVICE CMNT-IMP: ABNORMAL
SERVICE CMNT-IMP: NORMAL
SODIUM SERPL-SCNC: 138 MMOL/L (ref 136–145)
SPECIMEN EXP DATE BLD: NORMAL
WBC # BLD AUTO: 12 K/UL (ref 3.6–11)
WBC # BLD AUTO: 15.8 K/UL (ref 3.6–11)

## 2023-06-08 PROCEDURE — 2500000003 HC RX 250 WO HCPCS: Performed by: ANESTHESIOLOGY

## 2023-06-08 PROCEDURE — 86900 BLOOD TYPING SEROLOGIC ABO: CPT

## 2023-06-08 PROCEDURE — G0378 HOSPITAL OBSERVATION PER HR: HCPCS

## 2023-06-08 PROCEDURE — 59025 FETAL NON-STRESS TEST: CPT

## 2023-06-08 PROCEDURE — 7100000000 HC PACU RECOVERY - FIRST 15 MIN: Performed by: SPECIALIST

## 2023-06-08 PROCEDURE — 7210000100 HC LABOR FEE PER 1 HR

## 2023-06-08 PROCEDURE — 59020 FETAL CONTRACT STRESS TEST: CPT

## 2023-06-08 PROCEDURE — 2500000003 HC RX 250 WO HCPCS: Performed by: SPECIALIST

## 2023-06-08 PROCEDURE — 6360000002 HC RX W HCPCS: Performed by: REGISTERED NURSE

## 2023-06-08 PROCEDURE — 82962 GLUCOSE BLOOD TEST: CPT

## 2023-06-08 PROCEDURE — 96372 THER/PROPH/DIAG INJ SC/IM: CPT

## 2023-06-08 PROCEDURE — 36415 COLL VENOUS BLD VENIPUNCTURE: CPT

## 2023-06-08 PROCEDURE — 2580000003 HC RX 258: Performed by: SPECIALIST

## 2023-06-08 PROCEDURE — 86850 RBC ANTIBODY SCREEN: CPT

## 2023-06-08 PROCEDURE — 3609079900 HC CESAREAN SECTION: Performed by: SPECIALIST

## 2023-06-08 PROCEDURE — 6370000000 HC RX 637 (ALT 250 FOR IP): Performed by: SPECIALIST

## 2023-06-08 PROCEDURE — G0379 DIRECT REFER HOSPITAL OBSERV: HCPCS

## 2023-06-08 PROCEDURE — 7100000001 HC PACU RECOVERY - ADDTL 15 MIN: Performed by: SPECIALIST

## 2023-06-08 PROCEDURE — 2500000003 HC RX 250 WO HCPCS: Performed by: REGISTERED NURSE

## 2023-06-08 PROCEDURE — 85027 COMPLETE CBC AUTOMATED: CPT

## 2023-06-08 PROCEDURE — 85025 COMPLETE CBC W/AUTO DIFF WBC: CPT

## 2023-06-08 PROCEDURE — 80053 COMPREHEN METABOLIC PANEL: CPT

## 2023-06-08 PROCEDURE — 2580000003 HC RX 258: Performed by: REGISTERED NURSE

## 2023-06-08 PROCEDURE — 96374 THER/PROPH/DIAG INJ IV PUSH: CPT

## 2023-06-08 PROCEDURE — 6360000002 HC RX W HCPCS: Performed by: SPECIALIST

## 2023-06-08 PROCEDURE — 80307 DRUG TEST PRSMV CHEM ANLYZR: CPT

## 2023-06-08 PROCEDURE — 86901 BLOOD TYPING SEROLOGIC RH(D): CPT

## 2023-06-08 PROCEDURE — 3700000156 HC EPIDURAL ANESTHESIA

## 2023-06-08 PROCEDURE — 94761 N-INVAS EAR/PLS OXIMETRY MLT: CPT

## 2023-06-08 PROCEDURE — 3700000001 HC ADD 15 MINUTES (ANESTHESIA): Performed by: SPECIALIST

## 2023-06-08 PROCEDURE — 6360000002 HC RX W HCPCS: Performed by: NURSE ANESTHETIST, CERTIFIED REGISTERED

## 2023-06-08 PROCEDURE — 7100000001 HC PACU RECOVERY - ADDTL 15 MIN

## 2023-06-08 PROCEDURE — 1120000000 HC RM PRIVATE OB

## 2023-06-08 PROCEDURE — 3700000000 HC ANESTHESIA ATTENDED CARE: Performed by: SPECIALIST

## 2023-06-08 PROCEDURE — 99284 EMERGENCY DEPT VISIT MOD MDM: CPT

## 2023-06-08 PROCEDURE — 2709999900 HC NON-CHARGEABLE SUPPLY: Performed by: SPECIALIST

## 2023-06-08 PROCEDURE — 7100000000 HC PACU RECOVERY - FIRST 15 MIN

## 2023-06-08 RX ORDER — IBUPROFEN 600 MG/1
600 TABLET ORAL EVERY 8 HOURS SCHEDULED
Status: DISCONTINUED | OUTPATIENT
Start: 2023-06-08 | End: 2023-06-10 | Stop reason: HOSPADM

## 2023-06-08 RX ORDER — SODIUM CHLORIDE, SODIUM LACTATE, POTASSIUM CHLORIDE, CALCIUM CHLORIDE 600; 310; 30; 20 MG/100ML; MG/100ML; MG/100ML; MG/100ML
INJECTION, SOLUTION INTRAVENOUS CONTINUOUS
Status: DISCONTINUED | OUTPATIENT
Start: 2023-06-08 | End: 2023-06-10 | Stop reason: HOSPADM

## 2023-06-08 RX ORDER — HYDROMORPHONE HYDROCHLORIDE 1 MG/ML
0.5 INJECTION, SOLUTION INTRAMUSCULAR; INTRAVENOUS; SUBCUTANEOUS
Status: DISCONTINUED | OUTPATIENT
Start: 2023-06-08 | End: 2023-06-10 | Stop reason: HOSPADM

## 2023-06-08 RX ORDER — OXYCODONE HYDROCHLORIDE 5 MG/1
5 TABLET ORAL EVERY 4 HOURS PRN
Status: DISCONTINUED | OUTPATIENT
Start: 2023-06-08 | End: 2023-06-10 | Stop reason: HOSPADM

## 2023-06-08 RX ORDER — MISOPROSTOL 200 UG/1
200 TABLET ORAL PRN
Status: DISCONTINUED | OUTPATIENT
Start: 2023-06-08 | End: 2023-06-10 | Stop reason: HOSPADM

## 2023-06-08 RX ORDER — LANOLIN/MINERAL OIL
LOTION (ML) TOPICAL
Status: DISCONTINUED | OUTPATIENT
Start: 2023-06-08 | End: 2023-06-09

## 2023-06-08 RX ORDER — KETOROLAC TROMETHAMINE 30 MG/ML
30 INJECTION, SOLUTION INTRAMUSCULAR; INTRAVENOUS EVERY 6 HOURS
Status: DISCONTINUED | OUTPATIENT
Start: 2023-06-08 | End: 2023-06-09

## 2023-06-08 RX ORDER — KETOROLAC TROMETHAMINE 30 MG/ML
INJECTION, SOLUTION INTRAMUSCULAR; INTRAVENOUS PRN
Status: DISCONTINUED | OUTPATIENT
Start: 2023-06-08 | End: 2023-06-08 | Stop reason: SDUPTHER

## 2023-06-08 RX ORDER — SODIUM CHLORIDE 0.9 % (FLUSH) 0.9 %
5-40 SYRINGE (ML) INJECTION EVERY 12 HOURS SCHEDULED
Status: DISCONTINUED | OUTPATIENT
Start: 2023-06-08 | End: 2023-06-10 | Stop reason: HOSPADM

## 2023-06-08 RX ORDER — BUPIVACAINE HYDROCHLORIDE 7.5 MG/ML
INJECTION, SOLUTION INTRASPINAL PRN
Status: DISCONTINUED | OUTPATIENT
Start: 2023-06-08 | End: 2023-06-08 | Stop reason: SDUPTHER

## 2023-06-08 RX ORDER — ONDANSETRON 2 MG/ML
INJECTION INTRAMUSCULAR; INTRAVENOUS PRN
Status: DISCONTINUED | OUTPATIENT
Start: 2023-06-08 | End: 2023-06-08 | Stop reason: SDUPTHER

## 2023-06-08 RX ORDER — HYDROMORPHONE HYDROCHLORIDE 1 MG/ML
0.5 INJECTION, SOLUTION INTRAMUSCULAR; INTRAVENOUS; SUBCUTANEOUS ONCE
Status: COMPLETED | OUTPATIENT
Start: 2023-06-08 | End: 2023-06-08

## 2023-06-08 RX ORDER — SODIUM CHLORIDE 9 MG/ML
INJECTION, SOLUTION INTRAVENOUS PRN
Status: DISCONTINUED | OUTPATIENT
Start: 2023-06-08 | End: 2023-06-09

## 2023-06-08 RX ORDER — SODIUM CHLORIDE, SODIUM LACTATE, POTASSIUM CHLORIDE, CALCIUM CHLORIDE 600; 310; 30; 20 MG/100ML; MG/100ML; MG/100ML; MG/100ML
INJECTION, SOLUTION INTRAVENOUS CONTINUOUS
Status: DISCONTINUED | OUTPATIENT
Start: 2023-06-08 | End: 2023-06-08

## 2023-06-08 RX ORDER — DOCUSATE SODIUM 100 MG/1
100 CAPSULE, LIQUID FILLED ORAL 2 TIMES DAILY
Status: DISCONTINUED | OUTPATIENT
Start: 2023-06-08 | End: 2023-06-10 | Stop reason: HOSPADM

## 2023-06-08 RX ORDER — SODIUM CHLORIDE 0.9 % (FLUSH) 0.9 %
5-40 SYRINGE (ML) INJECTION PRN
Status: DISCONTINUED | OUTPATIENT
Start: 2023-06-08 | End: 2023-06-10 | Stop reason: HOSPADM

## 2023-06-08 RX ORDER — MISOPROSTOL 200 UG/1
TABLET ORAL
Status: DISCONTINUED
Start: 2023-06-08 | End: 2023-06-09

## 2023-06-08 RX ADMIN — ONDANSETRON HYDROCHLORIDE 4 MG: 2 INJECTION, SOLUTION INTRAMUSCULAR; INTRAVENOUS at 14:27

## 2023-06-08 RX ADMIN — HYDROMORPHONE HYDROCHLORIDE 0.5 MG: 1 INJECTION, SOLUTION INTRAMUSCULAR; INTRAVENOUS; SUBCUTANEOUS at 18:55

## 2023-06-08 RX ADMIN — KETOROLAC TROMETHAMINE 30 MG: 30 INJECTION, SOLUTION INTRAMUSCULAR; INTRAVENOUS at 14:46

## 2023-06-08 RX ADMIN — HYDROMORPHONE HYDROCHLORIDE 0.5 MG: 1 INJECTION, SOLUTION INTRAMUSCULAR; INTRAVENOUS; SUBCUTANEOUS at 21:11

## 2023-06-08 RX ADMIN — Medication 909 ML/HR: at 14:24

## 2023-06-08 RX ADMIN — IBUPROFEN 600 MG: 600 TABLET ORAL at 23:34

## 2023-06-08 RX ADMIN — BUPIVACAINE HYDROCHLORIDE IN DEXTROSE 1.5 ML: 7.5 INJECTION, SOLUTION SUBARACHNOID at 14:07

## 2023-06-08 RX ADMIN — SODIUM CHLORIDE, POTASSIUM CHLORIDE, SODIUM LACTATE AND CALCIUM CHLORIDE: 600; 310; 30; 20 INJECTION, SOLUTION INTRAVENOUS at 14:41

## 2023-06-08 RX ADMIN — SODIUM CHLORIDE, PRESERVATIVE FREE 10 ML: 5 INJECTION INTRAVENOUS at 21:13

## 2023-06-08 RX ADMIN — PHENYLEPHRINE HYDROCHLORIDE 20 MCG/MIN: 10 INJECTION INTRAVENOUS at 14:10

## 2023-06-08 RX ADMIN — HYDROMORPHONE HYDROCHLORIDE 0.5 MG: 1 INJECTION, SOLUTION INTRAMUSCULAR; INTRAVENOUS; SUBCUTANEOUS at 16:04

## 2023-06-08 RX ADMIN — SODIUM CHLORIDE, POTASSIUM CHLORIDE, SODIUM LACTATE AND CALCIUM CHLORIDE: 600; 310; 30; 20 INJECTION, SOLUTION INTRAVENOUS at 19:25

## 2023-06-08 RX ADMIN — CEFAZOLIN 2000 MG: 1 INJECTION, POWDER, FOR SOLUTION INTRAMUSCULAR; INTRAVENOUS at 13:45

## 2023-06-08 RX ADMIN — SODIUM CHLORIDE, POTASSIUM CHLORIDE, SODIUM LACTATE AND CALCIUM CHLORIDE: 600; 310; 30; 20 INJECTION, SOLUTION INTRAVENOUS at 19:21

## 2023-06-08 RX ADMIN — SODIUM CHLORIDE, POTASSIUM CHLORIDE, SODIUM LACTATE AND CALCIUM CHLORIDE: 600; 310; 30; 20 INJECTION, SOLUTION INTRAVENOUS at 13:57

## 2023-06-08 ASSESSMENT — PAIN DESCRIPTION - LOCATION
LOCATION: ABDOMEN

## 2023-06-08 ASSESSMENT — PAIN DESCRIPTION - ORIENTATION
ORIENTATION: LOWER;MID
ORIENTATION: MID
ORIENTATION: LOWER;MID

## 2023-06-08 ASSESSMENT — PAIN DESCRIPTION - DESCRIPTORS
DESCRIPTORS: SORE
DESCRIPTORS: CRAMPING
DESCRIPTORS: CRAMPING
DESCRIPTORS: SORE

## 2023-06-08 ASSESSMENT — LIFESTYLE VARIABLES: SMOKING_STATUS: 1

## 2023-06-08 ASSESSMENT — PAIN SCALES - GENERAL
PAINLEVEL_OUTOF10: 8
PAINLEVEL_OUTOF10: 8
PAINLEVEL_OUTOF10: 6
PAINLEVEL_OUTOF10: 8

## 2023-06-08 NOTE — ANESTHESIA PROCEDURE NOTES
CSE Block    Patient location during procedure: OR  Start time: 6/8/2023 1:57 PM  End time: 6/8/2023 2:07 PM  Reason for block: primary anesthetic  Staffing  Performed: anesthesiologist   Anesthesiologist: Meaghan Nobles MD  CSE  Patient position: sitting  Prep: ChloraPrep  Patient monitoring: continuous pulse ox and frequent blood pressure checks  Approach: midline  Provider prep: mask and sterile gloves  Spinal Needle  Needle type: pencil-tip   Needle gauge: 25 G  Needle length: 6 in  Epidural Needle  Injection technique: DL saline  Needle type: Tuohy   Needle gauge: 17 G  Needle length: 6 in  Needle insertion depth: 7 cm  Location: lumbar (1-5)  Catheter  Catheter type: end hole  Catheter size: 19 G  Catheter at skin depth: 4 cm  Test dose: negative  [id number]  Hemodynamics: stable  Additional Notes  1.5 mL 0.75% Bupivacaine deposited into CSF.   Preanesthetic Checklist  Completed: patient identified, IV checked, site marked, risks and benefits discussed, surgical/procedural consents, equipment checked, pre-op evaluation, timeout performed, anesthesia consent given, oxygen available, monitors applied/VS acknowledged, fire risk safety assessment completed and verbalized and blood product R/B/A discussed and consented

## 2023-06-08 NOTE — H&P
Patient admitted with hx of IDDM, HTN, Obesity, Asthma, prev c-sec X 2  Lungs : CTA  CVS: WNL  Abd Soft  P/E : 3cm /50, + PROM  Plan: repeat c-sec

## 2023-06-08 NOTE — PROGRESS NOTES
1915. Bedside shift change report given to XIMENA Sierra RN (oncoming nurse) by SARAH Longoria RN (offgoing nurse). Report included the following information Nurse Handoff Report, Index, Intake/Output, MAR, and Recent Results. 1945. Arleta Sales taken off of suction. Per verbal orders from Dr. Wilmer Azul, okay to unhook from suction, but to leave the Arleta Sales in place until the morning. Clarissa output 75mL. [phone]. Patient up to the bathroom, wiped with soap and water, cathether wiped with green wipes, and patient up in wheelchair and to the NICU.     0020. Arleta Sales slid out, fundal rub WDL. 0710. Bedside shift change report given to SARAH Patterson RN (oncoming nurse) by Miguel Sierra RN (offgoing nurse). Report included the following information Nurse Handoff Report, Index, Surgery Report, Intake/Output, MAR, and Recent Results.

## 2023-06-08 NOTE — PROGRESS NOTES
Called to eval pt for still having vag bleeding on fundal checks  Exam: 25-50 cc clots Mild vag bleed after clot  Clarissa uterine vac placed  Min return  Plan: turn off after 1 hr if stable, will d/c in am  Patient shankar procedure well

## 2023-06-08 NOTE — ANESTHESIA PRE PROCEDURE
Department of Anesthesiology  Preprocedure Note       Name:  Tom Luque   Age:  42 y.o.  :  1985                                          MRN:  [id number]         Date:  2023      Surgeon: Zhang Bentley):  Rosalyn Collet, MD    Procedure: Procedure(s):   SECTION    Medications prior to admission:   Prior to Admission medications    Medication Sig Start Date End Date Taking? Authorizing Provider   albuterol (PROVENTIL) (2.5 MG/3ML) 0.083% nebulizer solution Inhale into the lungs every 4 hours as needed 10/7/19   Ar Automatic Reconciliation   albuterol sulfate HFA (PROVENTIL;VENTOLIN;PROAIR) 108 (90 Base) MCG/ACT inhaler TAKE TWO PUFFS BY INHALATION EVERY FOUR HOURS AS NEEDED FOR WHEEZING. 10/7/19   Ar Automatic Reconciliation   aspirin 81 MG EC tablet Take by mouth daily    Ar Automatic Reconciliation   Cholecalciferol 50 MCG (2000 UT) TABS Take by mouth daily    Ar Automatic Reconciliation   clindamycin (CLEOCIN) 300 MG capsule Take by mouth 3 times daily    Ar Automatic Reconciliation   docusate sodium (COLACE) 50 MG capsule Take by mouth 2 times daily    Ar Automatic Reconciliation   ferrous sulfate (IRON 325) 325 (65 Fe) MG tablet Take by mouth every morning (before breakfast)    Ar Automatic Reconciliation   HYDROcodone-acetaminophen (NORCO) 5-325 MG per tablet Take 1 tablet by mouth every 6 hours as needed.  Max Daily Amount: 4 tablets    Ar Automatic Reconciliation   labetalol (NORMODYNE) 100 MG tablet Take by mouth 2 times daily    Ar Automatic Reconciliation   metFORMIN (GLUCOPHAGE) 500 MG tablet Take by mouth 2 times daily (with meals) 20   Ar Automatic Reconciliation   naproxen (NAPROSYN) 500 MG tablet Take by mouth 2 times daily (with meals) 3/22/21   Ar Automatic Reconciliation   norethindrone-ethinyl estradiol (ORTHO-NOVUM ) 0.5/0.75/1-35 MG-MCG per tablet Take 1 tablet by mouth daily 18   Ar Automatic Reconciliation   pioglitazone (ACTOS) 30 MG tablet Take by mouth

## 2023-06-08 NOTE — ANESTHESIA POSTPROCEDURE EVALUATION
Department of Anesthesiology  Postprocedure Note    Patient: Anna Fernandez  MRN: [id number]  YOB: 1985  Date of evaluation: 2023      Procedure Summary     Date: 23 Room / Location: [facility] L&D 02 / [facility] L[facility]D OR    Anesthesia Start: 1357 Anesthesia Stop: 1450    Procedure:  SECTION (Abdomen) Diagnosis:       38 weeks gestation of pregnancy      (type 1 diabetes, repeat)    Surgeons: Araceli Crespo MD Responsible Provider: Dre May MD    Anesthesia Type: Spinal ASA Status: 3          Anesthesia Type: Spinal    Everett Phase I:      Everett Phase II:        Anesthesia Post Evaluation    Patient location during evaluation: PACU  Patient participation: complete - patient participated  Level of consciousness: sleepy but conscious and responsive to verbal stimuli  Airway patency: patent  Nausea & Vomiting: no vomiting and no nausea  Complications: no  Cardiovascular status: blood pressure returned to baseline and hemodynamically stable  Respiratory status: acceptable  Hydration status: stable

## 2023-06-08 NOTE — PROGRESS NOTES
6/8/2023 12:51 PM to labor and delivery for SROM at 11 am today and planned C/s due to prior CS x 2 births. 1423Delivery of live baby boy     1445-cytotec given in OR, by provider in uterus during surgery     1530- Dr. Mike Barbosa called for IV pain medication order, received order    - Pt having added vaginal bleeding, Called Dr. Chio Lincoln, and TAXA and methergen given with firm uterus at -1 umbilicus     4118- Dr. Sherren Cao notified of pt with continued bleeding, Dr will be in to see pt soon, white pad replaced    1805- 2nd PIV placed with drawing of CBC sent     1935- SBAR at bedside to XIMENA Patterson RN

## 2023-06-09 PROBLEM — O24.919 DIABETES IN UNDELIVERED PREGNANCY: Status: ACTIVE | Noted: 2023-06-09

## 2023-06-09 LAB
GLUCOSE BLD STRIP.AUTO-MCNC: 143 MG/DL (ref 65–117)
GLUCOSE BLD STRIP.AUTO-MCNC: 163 MG/DL (ref 65–117)
SERVICE CMNT-IMP: ABNORMAL
SERVICE CMNT-IMP: ABNORMAL

## 2023-06-09 PROCEDURE — 6360000002 HC RX W HCPCS: Performed by: SPECIALIST

## 2023-06-09 PROCEDURE — 82962 GLUCOSE BLOOD TEST: CPT

## 2023-06-09 PROCEDURE — 1120000000 HC RM PRIVATE OB

## 2023-06-09 PROCEDURE — 6370000000 HC RX 637 (ALT 250 FOR IP): Performed by: CLINICAL NURSE SPECIALIST

## 2023-06-09 PROCEDURE — 6370000000 HC RX 637 (ALT 250 FOR IP): Performed by: SPECIALIST

## 2023-06-09 PROCEDURE — 2580000003 HC RX 258: Performed by: SPECIALIST

## 2023-06-09 PROCEDURE — 2500000003 HC RX 250 WO HCPCS: Performed by: SPECIALIST

## 2023-06-09 RX ORDER — INSULIN LISPRO 100 [IU]/ML
8 INJECTION, SOLUTION INTRAVENOUS; SUBCUTANEOUS
Status: DISCONTINUED | OUTPATIENT
Start: 2023-06-09 | End: 2023-06-10 | Stop reason: HOSPADM

## 2023-06-09 RX ORDER — INSULIN GLARGINE 100 [IU]/ML
24 INJECTION, SOLUTION SUBCUTANEOUS DAILY
Status: DISCONTINUED | OUTPATIENT
Start: 2023-06-09 | End: 2023-06-10 | Stop reason: HOSPADM

## 2023-06-09 RX ORDER — DEXTROSE MONOHYDRATE 100 MG/ML
INJECTION, SOLUTION INTRAVENOUS CONTINUOUS PRN
Status: DISCONTINUED | OUTPATIENT
Start: 2023-06-09 | End: 2023-06-10 | Stop reason: HOSPADM

## 2023-06-09 RX ORDER — INSULIN LISPRO 100 [IU]/ML
0-4 INJECTION, SOLUTION INTRAVENOUS; SUBCUTANEOUS NIGHTLY
Status: DISCONTINUED | OUTPATIENT
Start: 2023-06-09 | End: 2023-06-10 | Stop reason: HOSPADM

## 2023-06-09 RX ORDER — MISOPROSTOL 100 UG/1
TABLET ORAL PRN
Status: DISCONTINUED | OUTPATIENT
Start: 2023-06-08 | End: 2023-06-09 | Stop reason: ALTCHOICE

## 2023-06-09 RX ORDER — INSULIN LISPRO 100 [IU]/ML
0-16 INJECTION, SOLUTION INTRAVENOUS; SUBCUTANEOUS
Status: DISCONTINUED | OUTPATIENT
Start: 2023-06-09 | End: 2023-06-10 | Stop reason: HOSPADM

## 2023-06-09 RX ORDER — TRANEXAMIC ACID 100 MG/ML
15 INJECTION, SOLUTION INTRAVENOUS ONCE
Status: COMPLETED | OUTPATIENT
Start: 2023-06-08 | End: 2023-06-09

## 2023-06-09 RX ORDER — METHYLERGONOVINE MALEATE 0.2 MG/ML
200 INJECTION INTRAVENOUS ONCE
Status: COMPLETED | OUTPATIENT
Start: 2023-06-08 | End: 2023-06-09

## 2023-06-09 RX ADMIN — DOCUSATE SODIUM 100 MG: 100 CAPSULE, LIQUID FILLED ORAL at 22:19

## 2023-06-09 RX ADMIN — Medication 8 UNITS: at 20:03

## 2023-06-09 RX ADMIN — IBUPROFEN 600 MG: 600 TABLET ORAL at 17:27

## 2023-06-09 RX ADMIN — OXYCODONE HYDROCHLORIDE 5 MG: 5 TABLET ORAL at 22:19

## 2023-06-09 RX ADMIN — DOCUSATE SODIUM 100 MG: 100 CAPSULE, LIQUID FILLED ORAL at 09:04

## 2023-06-09 RX ADMIN — IBUPROFEN 600 MG: 600 TABLET ORAL at 09:04

## 2023-06-09 RX ADMIN — SODIUM CHLORIDE, POTASSIUM CHLORIDE, SODIUM LACTATE AND CALCIUM CHLORIDE: 600; 310; 30; 20 INJECTION, SOLUTION INTRAVENOUS at 04:00

## 2023-06-09 RX ADMIN — HYDROMORPHONE HYDROCHLORIDE 0.5 MG: 1 INJECTION, SOLUTION INTRAMUSCULAR; INTRAVENOUS; SUBCUTANEOUS at 03:59

## 2023-06-09 RX ADMIN — METHYLERGONOVINE MALEATE 200 MCG: 0.2 INJECTION, SOLUTION INTRAMUSCULAR; INTRAVENOUS at 14:58

## 2023-06-09 RX ADMIN — TRANEXAMIC ACID 1715 MG: 100 INJECTION, SOLUTION INTRAVENOUS at 14:58

## 2023-06-09 ASSESSMENT — PAIN DESCRIPTION - LOCATION
LOCATION: INCISION
LOCATION: ABDOMEN

## 2023-06-09 ASSESSMENT — PAIN SCALES - GENERAL
PAINLEVEL_OUTOF10: 7
PAINLEVEL_OUTOF10: 7

## 2023-06-09 ASSESSMENT — PAIN DESCRIPTION - PAIN TYPE: TYPE: SURGICAL PAIN

## 2023-06-09 ASSESSMENT — PAIN DESCRIPTION - DESCRIPTORS: DESCRIPTORS: SORE

## 2023-06-09 NOTE — PROGRESS NOTES
1000 IV to saline lock, ognzales removed, up to BR for pericare, voided without difficulty, not measured at this time, educated patient that we will be catching the next 2 voids for measurement, patient verbalized understanding, to wheelchair to visit NICU, no complaints    1205 in to check on patient, patient just finished eating, vitals obtained, diabetes health just left, glucose check deferred until before dinner, educated patient of timing of glucose checks for today, patient verbalized understanding    [phone] patient refusing to eat dinner provided by hospital, will have family bring dinner, glucose to be checked when dinner in room

## 2023-06-09 NOTE — OP NOTE
spontaneously with findings as above. The uterus was kept in situ and the uterine incision was closed using a running interlocking suture of 0 Vicryl. There was good hemostasis. Hemostatic powder was placed over the uterine incision to assure hemostasis. Attention was turned to the fascia, which was closed with a running suture of #1 Vicryl. Subcutaneous layer was irrigated. Bleeders were controlled using the Bovie. The skin was closed using the Insorb stapler and sterile dressing was applied. There was clear urine draining from the Blanchard at the end of procedure. All sponge, needle and instrument counts were correct x2 at the end of procedure. The patient tolerated the procedure well, was taken to the recovery area in Labor and Delivery in satisfactory condition.         Lida Hoffman MD      [id number]  D:  06/08/2023 14:46  T:  06/08/2023 23:36  JOB #:  [id number]

## 2023-06-09 NOTE — DIABETES MGMT
diabetes-related events over the past 24-72 hours  6/8/23 Admission   6/9/23     Assessment and Nursing Intervention   Nursing Diagnosis Risk for unstable blood glucose pattern   Nursing Intervention Domain 5250 Decision-making Support   Nursing Interventions Examined current inpatient diabetes/blood glucose control   Explored factors facilitating and impeding inpatient management  Explored corrective strategies with patient and responsible inpatient provider   Informed patient of rational for insulin strategy while hospitalized     Nursing Diagnosis 04753 Ineffective Health Management   Nursing Intervention Domain 5250 Decision-making Support   Nursing Interventions Identified diabetes self-management practices impeding diabetes control  Discussed diabetes survival skills related to  Medications plan at discharge     Billing Code(s)   [] 48554 IP subsequent hospital care - 50 minutes   [] 84870 IP subsequent hospital care - 35 minutes   [] 13255 IP subsequent hospital care - 25 minutes   [x] 85614 IP initial hospital care - 40 minutes     Before making these care recommendations, I personally reviewed the hospitalization record, including notes, laboratory & diagnostic data and current medications, and examined the patient at the bedside (circumstances permitting) before determining care. More than fifty (50) percent of the time was spent in patient counseling and/or care coordination.   Total minutes: ZANE Russell 51, APRN - CNS  Diabetes Clinical Nurse Specialist  [facility]  Access via [facility]

## 2023-06-09 NOTE — PROGRESS NOTES
Post-Operative Day Number 1 Progress Note    Patient doing well post-op day 1 from  delivery without significant complaints. Pain controlled on current medication. Voiding without difficulty, normal lochia. Vitals:  Patient Vitals for the past 8 hrs:   BP Temp Temp src Pulse Resp SpO2   23 0712 116/66 98.7 °F (37.1 °C) Oral 76 18 99 %   23 0359 -- -- -- -- 16 --   23 0339 (!) 113/55 98.7 °F (37.1 °C) Oral 85 15 93 %     Temp (24hrs), Av.3 °F (31.3 °C), Min:36.7 °F (2.6 °C), Max:98.9 °F (37.2 °C)      Vital signs stable, afebrile. Exam:  Patient without distress. Abdomen soft, fundus firm at level of umbilicus, non tender. Incision dry and clean without erythema. Lower extremities are negative for swelling, cords or tenderness. Lab/Data Review: All lab results for the last 24 hours reviewed. Assessment and Plan:  Patient appears to be having uncomplicated post- course. Continue routine post-op care and maternal education.

## 2023-06-10 VITALS
DIASTOLIC BLOOD PRESSURE: 61 MMHG | TEMPERATURE: 98.4 F | HEART RATE: 83 BPM | SYSTOLIC BLOOD PRESSURE: 116 MMHG | OXYGEN SATURATION: 98 % | RESPIRATION RATE: 16 BRPM | HEIGHT: 65 IN | BODY MASS INDEX: 41.99 KG/M2 | WEIGHT: 252 LBS

## 2023-06-10 LAB
GLUCOSE BLD STRIP.AUTO-MCNC: 109 MG/DL (ref 65–117)
GLUCOSE BLD STRIP.AUTO-MCNC: 195 MG/DL (ref 65–117)
GLUCOSE BLD STRIP.AUTO-MCNC: 90 MG/DL (ref 65–117)
SERVICE CMNT-IMP: ABNORMAL
SERVICE CMNT-IMP: NORMAL
SERVICE CMNT-IMP: NORMAL

## 2023-06-10 PROCEDURE — 6370000000 HC RX 637 (ALT 250 FOR IP): Performed by: SPECIALIST

## 2023-06-10 PROCEDURE — 6370000000 HC RX 637 (ALT 250 FOR IP): Performed by: CLINICAL NURSE SPECIALIST

## 2023-06-10 PROCEDURE — 82962 GLUCOSE BLOOD TEST: CPT

## 2023-06-10 RX ORDER — IBUPROFEN 600 MG/1
600 TABLET ORAL EVERY 8 HOURS SCHEDULED
Qty: 120 TABLET | Refills: 3 | Status: SHIPPED | OUTPATIENT
Start: 2023-06-10

## 2023-06-10 RX ORDER — IBUPROFEN 800 MG/1
800 TABLET ORAL 2 TIMES DAILY PRN
Qty: 60 TABLET | Refills: 5 | Status: SHIPPED | OUTPATIENT
Start: 2023-06-10

## 2023-06-10 RX ORDER — OXYCODONE HYDROCHLORIDE 5 MG/1
5 TABLET ORAL EVERY 4 HOURS PRN
Qty: 20 TABLET | Refills: 0 | Status: SHIPPED | OUTPATIENT
Start: 2023-06-10 | End: 2023-06-13

## 2023-06-10 RX ADMIN — Medication 8 UNITS: at 12:04

## 2023-06-10 RX ADMIN — DOCUSATE SODIUM 100 MG: 100 CAPSULE, LIQUID FILLED ORAL at 09:46

## 2023-06-10 RX ADMIN — IBUPROFEN 600 MG: 600 TABLET ORAL at 03:02

## 2023-06-10 RX ADMIN — Medication 8 UNITS: at 09:46

## 2023-06-10 RX ADMIN — INSULIN GLARGINE 24 UNITS: 100 INJECTION, SOLUTION SUBCUTANEOUS at 09:48

## 2023-06-10 RX ADMIN — IBUPROFEN 600 MG: 600 TABLET ORAL at 11:38

## 2023-06-10 ASSESSMENT — PAIN DESCRIPTION - DESCRIPTORS: DESCRIPTORS: BURNING

## 2023-06-10 ASSESSMENT — PAIN SCALES - GENERAL
PAINLEVEL_OUTOF10: 4
PAINLEVEL_OUTOF10: 5

## 2023-06-10 ASSESSMENT — PAIN DESCRIPTION - ORIENTATION: ORIENTATION: LOWER

## 2023-06-10 ASSESSMENT — PAIN DESCRIPTION - LOCATION: LOCATION: INCISION

## 2023-06-10 ASSESSMENT — PAIN DESCRIPTION - PAIN TYPE: TYPE: SURGICAL PAIN

## 2023-06-10 NOTE — PROGRESS NOTES
Discharge paperwork reviewed and signed. Copy given to patient and copy placed on the chart. Verified with patient pharmacy to  R[facility]. Patient to follow up in 1 week with OB and endocrinologist.       Jose Alberto Avitia  Depression Screening Prior to Discharge:      EPDS screening completed. I have completed education and provided available resources for postpartum depression to the patient. Patient has verbalized understanding of signs and symptoms to report and all questions answered. Based on EPDS score of   Postpartum Depression: Low Risk     Last EPDS Total Score: 2    Last EPDS Self Harm Result: Never    patient to schedule a follow-up appointment in 1 week with OB.

## 2023-06-10 NOTE — PROGRESS NOTES
Post-Operative  Day 2  Umm Lewis     Information for the patient's :  Lauren Hemp [[id number]]    Patient doing well without significant complaint. Nausea and vomiting resolved, tolerating liquids, passing flatus, voiding and ambulating without difficulty. Vitals:    Vitals:    06/10/23 0255   BP: (!) 104/58   Pulse: 72   Resp: 18   Temp: 98.3 °F (36.8 °C)   SpO2: 99%     Temp (24hrs), Av.1 °F (36.7 °C), Min:97.9 °F (36.6 °C), Max:98.3 °F (36.8 °C)        Exam:        Patient without distress. Abdomen, bowel sounds present, soft, expected tenderness, fundus firm                Wound incision clean, dry and intact               Lower extremities are negative for swelling, cords or tenderness. Labs:   Lab Results   Component Value Date/Time    WBC 15.8 2023 06:31 PM    WBC 12.0 2023 01:11 PM    WBC 10.0 10/06/2020 04:40 AM    WBC 9.2 10/05/2020 12:09 PM    WBC 10.7 10/05/2020 06:25 AM    HGB 10.7 2023 06:31 PM    HGB 11.6 2023 01:11 PM    HGB 9.3 10/06/2020 04:40 AM    HGB 10.9 10/05/2020 12:09 PM    HGB 11.7 10/05/2020 06:25 AM    HCT 34.4 2023 06:31 PM    HCT 36.3 2023 01:11 PM    HCT 29.6 10/06/2020 04:40 AM    HCT 34.5 10/05/2020 12:09 PM    HCT 37.0 10/05/2020 06:25 AM     2023 06:31 PM     2023 01:11 PM     10/06/2020 04:40 AM     10/05/2020 12:09 PM     10/05/2020 06:25 AM       Recent Results (from the past 24 hour(s))   POCT Glucose    Collection Time: 23  6:43 PM   Result Value Ref Range    POC Glucose 143 (H) 65 - 117 mg/dL    Performed by: Zay Hudson RN    POCT Glucose    Collection Time: 06/10/23 12:30 AM   Result Value Ref Range    POC Glucose 109 65 - 117 mg/dL    Performed by:  Khadar Borrero    POCT Glucose    Collection Time: 06/10/23  8:35 AM   Result Value Ref Range    POC Glucose 195 (H) 65 - 117 mg/dL    Performed by: Khadra Edwards        Assessment: Post-Op day 2,

## 2024-01-15 NOTE — PROGRESS NOTES
Subjective:     Sarah Horan is a 38 y.o. female seen for follow-up of diabetes. Recent ER visit for dental abscess for which she was prescribed an antibiotic and pain medications. Has a follow-up with dentist.  They prescribed insulin but no needles. She has had hypoglycemic attacks. .no  Blood sugar control has been better she says    Lab Results   Component Value Date/Time    Hemoglobin A1c 12.6 (H) 08/19/2019 03:15 PM    Hemoglobin A1c 13.5 (H) 08/14/2018 11:45 AM    Hemoglobin A1c 8.1 (H) 08/19/2016 10:54 AM    Hemoglobin A1c, External 11.4 03/22/2018    Hemoglobin A1c, External 13.5 04/10/2015    Glucose 154 (H) 10/05/2020 06:25 AM    Glucose (POC) 132 (H) 10/06/2020 10:22 PM    Microalb/Creat ratio (ug/mg creat.) 2.3 08/19/2019 03:15 PM    LDL, calculated 128 (H) 08/19/2019 03:15 PM    Creatinine 0.55 10/05/2020 06:25 AM       She has diabetes, hypertension and hyperlipidemia. Sarah Horan has the additional concern of needs filler needles for insulin which she has. Reports taking blood pressure medications without side affects. No complaints of exertional chest pain, excessive shortness of breath or focal weakness. Minimal swelling in lower legs or dizziness with standing. Diet and Lifestyle: follows a diabetic diet regularly. Patient Active Problem List    Diagnosis Date Noted    IUP (intrauterine pregnancy), incidental 10/05/2020    Obesity, morbid ([facility] [address] 03/26/2018    Hyperglycemia due to type 2 diabetes mellitus ([facility] [address] 03/23/2016    Hypertension 03/23/2016    Asthma 03/23/2016     Current Outpatient Medications   Medication Sig Dispense Refill    insulin lispro (HUMALOG KWIKPEN INSULIN SC) by SubCUTAneous route. Sliding scale      Insulin Needles, Disposable, 31 gauge x 5/16\" ndle Up to 4 times daily 1 Package 11    clindamycin (CLEOCIN) 300 mg capsule Take 300 mg by mouth three (3) times daily.       HYDROcodone-acetaminophen (NORCO) 5-325 mg per tablet Take 1 Tab by mouth every six (6) hours as needed for Pain.  naproxen (NAPROSYN) 500 mg tablet Take 1 Tab by mouth two (2) times daily (with meals). Take for 5 days then re-evaluate  Indications: pain 30 Tab 0    labetaloL (NORMODYNE) 100 mg tablet Take 100 mg by mouth two (2) times a day.  docusate sodium (COLACE) 50 mg capsule Take 50 mg by mouth two (2) times a day.  ferrous sulfate (Iron) 325 mg (65 mg iron) tablet Take  by mouth Daily (before breakfast).  aspirin delayed-release 81 mg tablet Take  by mouth daily.  cholecalciferol, vitamin D3, (Vitamin D3) 50 mcg (2,000 unit) tab Take 2,000 mcg by mouth daily.  PNV Comb #2-Iron-FA-Omega 3 29-1-400 mg cmpk Take  by mouth.  metFORMIN (GLUCOPHAGE) 500 mg tablet TAKE 1 TAB BY MOUTH TWO (2) TIMES DAILY (WITH MEALS). (Patient taking differently: Take 500 mg by mouth daily (with breakfast). ) 60 Tab 10    glucose blood VI test strips (ASCENSIA AUTODISC VI, ONE TOUCH ULTRA TEST VI) strip May check blood sugar daily, true matrix strips 50 Strip 5    Blood-Glucose Meter monitoring kit Use up to three times per days 1 Kit 0    albuterol (VENTOLIN HFA) 90 mcg/actuation inhaler TAKE TWO PUFFS BY INHALATION EVERY FOUR HOURS AS NEEDED FOR WHEEZING. 1 Inhaler 5    albuterol (PROVENTIL VENTOLIN) 2.5 mg /3 mL (0.083 %) nebu 3 mL by Nebulization route every four (4) hours as needed (wheezing). 100 Each 5    pioglitazone (ACTOS) 30 mg tablet Take 1 Tab by mouth daily. 30 Tab 5    norethindrone-ethinyl estradiol (ORTHO-NOVUM 1-35 TAB, NORTREL 1-35 TAB) 1-35 mg-mcg tab Take 1 Tab by mouth daily.  1 Dose Pack 5     Allergies   Allergen Reactions    Mirapex [Pramipexole] Drowsiness    Morphine Shortness of Breath and Rash    Tramadol Other (comments)     headache     Past Medical History:   Diagnosis Date    Abnormal Papanicolaou smear of cervix     HPV    Anemia     Asthma     Diabetes ([address]     Hypertension     Phlebitis and thrombophlebitis      Social History     Tobacco Use    Smoking status: Current Every Day Smoker     Packs/day: 0.15     Years: 15.00     Pack years: 2.25     Types: Cigarettes     Start date: 3/23/2004     Last attempt to quit: 2017     Years since quittin.2    Smokeless tobacco: Never Used   Substance Use Topics    Alcohol use: No     Alcohol/week: 0.0 standard drinks             Review of Systems  Pertinent items are noted in HPI. Objective:     Significant for the following: OW  There were no vitals taken for this visit. WD WN female NAD      Lab review: orders written for new lab studies as appropriate; see orders. Assessment/Plan:     Follow-up diabetes control uncertain, needs improvement. Diabetic issues reviewed with her: all medications, side effects and compliance discussed carefully, use and side effects of insulin is taught and glycohemoglobin and other lab monitoring discussed. Chronic Conditions Addressed Today     1. Asthma    2. Hyperglycemia due to type 2 diabetes mellitus (HCC) - Primary     Relevant Medications     insulin lispro (HUMALOG KWIKPEN INSULIN SC)     Insulin Needles, Disposable, 31 gauge x 5/16\" ndle    3. Hypertension        Orders Placed This Encounter    insulin lispro (HUMALOG KWIKPEN INSULIN SC)     Sig: by SubCUTAneous route. Sliding scale    DISCONTD: insulin lispro (HumaLOG U-100 Insulin) 100 unit/mL injection     Sig: by SubCUTAneous route. Sliding scale    Insulin Needles, Disposable, 31 gauge x 5/16\" ndle     Sig: Up to 4 times daily     Dispense:  1 Package     Refill:  11    clindamycin (CLEOCIN) 300 mg capsule     Sig: Take 300 mg by mouth three (3) times daily.  HYDROcodone-acetaminophen (NORCO) 5-325 mg per tablet     Sig: Take 1 Tab by mouth every six (6) hours as needed for Pain. Home

## 2024-06-01 NOTE — PROGRESS NOTES
Chief Complaint   Patient presents with    Diabetes     pcp and pharmacy verified. Is 5 weeks pregnant. Dennis Prince is OB   Records since last visit reviewed  History of Present Illness: Chikis Neff is a 27 y.o. female here for follow up of diabetes. She was diagnosed with diabetes at the age of 21 so her DM pre-dates her pregnancy. At our last visit in February 2017 she was on Metformin 500mg BID, and her diarrhea had improved. She was also taking Januvia 100mg daily. She was 32 weeks pregnant and her MARIO was 4/14/17. She never kept her post-partum follow up visits. Per it again pregnant (5 weeks pregnant). She does not yet have an MARIO. Per the records from her OB her A1C on 5/17/17 was 6.1%, but last week on 3/21/18 her A1C was 11.4%. She was referred to the DM treatment center and was told to come back to see me to treat her DM. She is currently taking Metformin 500mg BID and Glyburide 5mg BID. This was started on by Dr. Nancy Jeffers. Before last week she had stopped taking all of her DM medications. She is checking her BGs 4 times per day. She notes her BGs have improved from 200's down to 100. She notes her BG this afternoon at the DM treatment center was 78. A typical day is as follows:  Pt wakes around 7:00 am.  She has breakfast about 3-4 days per week (depending on how I feel). If she does not eat breakfast she will have a glass of milk. She takes the first dose of glyburide in the morning. Yesterday for breakfast she had cereal with SNL, \"gogurt\" and milk. She has lunch around 1PM, yesterday she had salisbery steak and mashed potatoes and vitamin water. She has dinner around 6-630PM, last night she had sausage and peppers with penne pasta and a salad and water. She goes to bed around 10:00pm:10:30pm. When my baby goes to bed I go to bed. She notes her asthma \"is doing pretty good\". She has had a productive cough.  In February 2018 she had \"a sinus thing\" and she was given prednisone. She notes the on Nov 1st she moved to [place], [place]. She is very happy with her new house. She has been walking every day \"I'm running all day long to keep up with my baby\". She is not going outside much, because it is too cold. No history of vascular disease. No history of retinopathy, neuropathy, or nephropathy. Last eye exam was July 2016, by Dr. Anatoly Rivas, no retinopathy. Current Outpatient Prescriptions   Medication Sig    pnv w/o calcium-iron fum-fa 27-1 mg tab Take  by mouth.  metFORMIN (GLUCOPHAGE) 500 mg tablet Take 2 Tabs by mouth two (2) times daily (with meals).  glyBURIDE (DIABETA) 5 mg tablet Take 1 Tab by mouth two (2) times daily (with meals).  albuterol (PROAIR HFA) 90 mcg/actuation inhaler Take 2 Puffs by inhalation every four (4) hours as needed for Wheezing.  albuterol (PROVENTIL VENTOLIN) 2.5 mg /3 mL (0.083 %) nebulizer solution 3 mL by Nebulization route every four (4) hours as needed for Wheezing.  Blood-Glucose Meter monitoring kit Use up to three times per days     No current facility-administered medications for this visit.       Allergies   Allergen Reactions    Mirapex [Pramipexole] Drowsiness    Morphine Shortness of Breath and Rash    Tramadol Other (comments)     headache     Review of Systems:  - Eyes: no blurry vision or double vision  - Cardiovascular: no chest pain  - Respiratory: no shortness of breath  - Musculoskeletal: no myalgias  - Neurological: no numbness/tingling in extremities    Physical Examination:  Blood pressure 105/57, pulse (!) 101, height 5' 6\" (1.676 m), weight 252 lb (114.3 kg), unknown if currently breastfeeding.  - General: pleasant, no distress, good eye contact   - Neck: no carotid bruits  - Cardiovascular: regular, normal rate, nl s1 and s2, no m/r/g, 2+ DP pulses   - Respiratory: clear bilaterally  - Integumentary: no edema, no foot ulcers, sensation to monofilament and vibration intact bilaterally  - Psychiatric: normal mood and affect    Data Reviewed:   Records from [facility] (Dr. Ellie Mas) reviewed    Assessment/Plan:   1) DM > When she was found to be pregnant her A1C was in the 11% range. Pt reports that on the Metformin 500mg BID and Glyburide 5mg BID her BGs have improved greatly. For now will have pt continue the Metformin 500mg BID and Glyburide 5mg BID. Pt to check her BGs fasting and two hours after each meal and send me some BG readings in 2 weeks. Pt to call if she begins to have low BGs. Will see pt back in 6 weeks. RTC 5/4/18    Pt voices understanding and agreement with the plan. We spent 40 minutes of face to face time together and > 50% of the time was spent in counseling on the above issues. Follow-up Disposition:  Return in 6 weeks (on 5/4/2018). Copy sent to:  Liset Glass and Ellie Mas negative - no fever

## 2024-08-06 ENCOUNTER — OFFICE VISIT (OUTPATIENT)
Age: 39
End: 2024-08-06
Payer: MEDICAID

## 2024-08-06 VITALS
HEART RATE: 82 BPM | DIASTOLIC BLOOD PRESSURE: 60 MMHG | SYSTOLIC BLOOD PRESSURE: 109 MMHG | WEIGHT: 224.6 LBS | HEIGHT: 65 IN | BODY MASS INDEX: 37.42 KG/M2

## 2024-08-06 DIAGNOSIS — E11.65 TYPE 2 DIABETES MELLITUS WITH HYPERGLYCEMIA, WITHOUT LONG-TERM CURRENT USE OF INSULIN (HCC): ICD-10-CM

## 2024-08-06 DIAGNOSIS — Z79.4 TYPE 2 DIABETES MELLITUS WITH HYPERGLYCEMIA, WITH LONG-TERM CURRENT USE OF INSULIN (HCC): Primary | ICD-10-CM

## 2024-08-06 DIAGNOSIS — E11.65 TYPE 2 DIABETES MELLITUS WITH HYPERGLYCEMIA, WITH LONG-TERM CURRENT USE OF INSULIN (HCC): Primary | ICD-10-CM

## 2024-08-06 PROCEDURE — 3078F DIAST BP <80 MM HG: CPT | Performed by: INTERNAL MEDICINE

## 2024-08-06 PROCEDURE — 3074F SYST BP LT 130 MM HG: CPT | Performed by: INTERNAL MEDICINE

## 2024-08-06 PROCEDURE — 99204 OFFICE O/P NEW MOD 45 MIN: CPT | Performed by: INTERNAL MEDICINE

## 2024-08-06 PROCEDURE — G2211 COMPLEX E/M VISIT ADD ON: HCPCS | Performed by: INTERNAL MEDICINE

## 2024-08-06 RX ORDER — PEN NEEDLE, DIABETIC 31 GX5/16"
NEEDLE, DISPOSABLE MISCELLANEOUS 2 TIMES DAILY
COMMUNITY
Start: 2024-07-22

## 2024-08-06 RX ORDER — CYCLOBENZAPRINE HCL 5 MG
5 TABLET ORAL NIGHTLY PRN
COMMUNITY
Start: 2024-07-23 | End: 2024-09-21

## 2024-08-06 RX ORDER — ERGOCALCIFEROL 1.25 MG/1
50000 CAPSULE ORAL WEEKLY
COMMUNITY

## 2024-08-06 RX ORDER — METFORMIN HYDROCHLORIDE EXTENDED-RELEASE TABLETS 500 MG/1
500 TABLET, FILM COATED, EXTENDED RELEASE ORAL 2 TIMES DAILY WITH MEALS
COMMUNITY
Start: 2024-07-24

## 2024-08-06 RX ORDER — DULAGLUTIDE 0.75 MG/.5ML
0.75 INJECTION, SOLUTION SUBCUTANEOUS WEEKLY
Qty: 2 ML | Refills: 3 | Status: SHIPPED | OUTPATIENT
Start: 2024-08-06

## 2024-08-06 RX ORDER — INSULIN GLARGINE 100 [IU]/ML
20 INJECTION, SOLUTION SUBCUTANEOUS 2 TIMES DAILY
COMMUNITY

## 2024-08-06 NOTE — PROGRESS NOTES
Chief Complaint   Patient presents with    New Patient    Diabetes     Pcp and pharmacy verified     History of Present Illness: Sandeep Solis is a 38 y.o. female who I was asked to see in consult by Crystal Burrell for evaluation of diabetes.  Was diagnosed with diabetes at the age of 17. I had previously seen Ms Solis, for the same, but I have not seen her since 2018.  \"My sugars are out of control and I need to get them out of the 300s. I have been changing my eating habits, but I can not change the creamer in my coffee.\"    She is currently taking Trulicity 1.5mg every Saturday, \"I was on 0.75mg before, but the 1.5mg made me very sick\". She is also taking Metformin XR 500mg BID, and Lantus 20 units every morning.    She is testing her BG using the FreeStyle she did not bring her reader with her today.  Her FBG run in the 250-300s range, in the afternoon her BG run in the 190s, after dinner her BG run in the 280-300s.    Most recent Hgb A1c was 12.0% in July 2024.     - She wakes around 6AM  - She has coffee (creamer and SNL) around 8AM and had breakfast around 10AM, yesterday she had eggs, sausage, apple sauce and more coffee  - She does not have a mid-morning snack.  - She has lunch 3-4 days per week, yesterday she had a turkey and cheese wrap and propel water.  - She will have a mid-afternoon snack of cereal.  - She has dinner around 5-6PM, last night she had cereal and propel water.  - She does not typically have an evening snack, if she does she will have yogurt, cottage cheese or apple sauce.    Exercise consists of \"taking care of my kids I have a 3 y/o and a 2 y/o.    No history of vascular disease.      Last eye exam was \"it has been awhile, they are so busy, the doctor I was going to has closed.\"    Past Medical History:   Diagnosis Date    Abnormal Papanicolaou smear of cervix     HPV    Anemia     Asthma     Diabetes (HCC)     Hypertension     Phlebitis and thrombophlebitis      Past Surgical History:

## 2024-08-06 NOTE — PATIENT INSTRUCTIONS
1) I want you to increase your Lantus from 20 units every day, to 26 units every day.    2) I am going to change your Trulicity back to 0.75mg every week.    3) Increase your Metformin to 1 pill in the morning and 2 pills with dinner for the next 2 weeks. After 2 weeks, increase to 2 pills in the morning and 2 pills with dinner.

## (undated) DEVICE — TIP CLEANER: Brand: VALLEYLAB

## (undated) DEVICE — BARRIER TISS ABSRB 5X6IN 1/PK -- DIRECT ORDER ONLY NON MEDLINE

## (undated) DEVICE — SUTURE VCRL SZ 1 L36IN ABSRB VLT L48MM CTX 1/2 CIR J371H

## (undated) DEVICE — SUTURE VCRL SZ 0 L36IN ABSRB UD L40MM CT 1/2 CIR TAPERPOINT J958H

## (undated) DEVICE — LARGE, DISPOSABLE ALEXIS O C-SECTION PROTECTOR - RETRACTOR: Brand: ALEXIS ® O C-SECTION PROTECTOR - RETRACTOR

## (undated) DEVICE — TUBING, SUCTION, 1/4" X 10', STRAIGHT: Brand: MEDLINE

## (undated) DEVICE — SUTURE VCRL SZ 3-0 L36IN ABSRB VLT CT L40MM 1/2 CIR J356H

## (undated) DEVICE — CLEANER ES TIP W2XL2IN ADH BK RADPQ FOR S STL ELECTRD

## (undated) DEVICE — MEDI-VAC NON-CONDUCTIVE SUCTION TUBING: Brand: CARDINAL HEALTH

## (undated) DEVICE — STRAP,POSITIONING,KNEE/BODY,FOAM,4X60": Brand: MEDLINE

## (undated) DEVICE — SOLIDIFIER FLD 3.2OZ 3000CC TRAD IN BTL LIQUI-LOC

## (undated) DEVICE — ELECTRODE PT RET AD L9FT HI MOIST COND ADH HYDRGEL CORDED

## (undated) DEVICE — APPLICATOR MEDICATED 26 CC SOLUTION HI LT ORNG CHLORAPREP

## (undated) DEVICE — SUTURE VCRL SZ 0 L36IN ABSRB VLT L40MM CT 1/2 CIR J358H

## (undated) DEVICE — PENCIL ES L3M BTTN SWCH S STL HEX LOK BLDE ELECTRD HOLSTER

## (undated) DEVICE — PREP SKN CHLRAPRP APL 26ML STR --

## (undated) DEVICE — SOLUTION IRRIG 1000ML 0.9% SOD CHL USP POUR PLAS BTL

## (undated) DEVICE — 3000CC GUARDIAN II: Brand: GUARDIAN

## (undated) DEVICE — SOLUTION IV 1000ML 0.9% SOD CHL

## (undated) DEVICE — (D)GLOVE SURG TRIFLX 7.5 PWD -- DISC BY MFR USE ITEM 102005

## (undated) DEVICE — C-SECTION II-LF: Brand: MEDLINE INDUSTRIES, INC.

## (undated) DEVICE — SPONGE COUNTING BAG: Brand: DEVON

## (undated) DEVICE — C-SECTION MRMC: Brand: MEDLINE INDUSTRIES, INC.

## (undated) DEVICE — CANISTER, RIGID, 3000CC: Brand: MEDLINE INDUSTRIES, INC.

## (undated) DEVICE — SPONGE LAPAROTOMY W18XL18IN WHITE STRUNG RADIOPAQUE STERILE

## (undated) DEVICE — STAPLER SKIN SQ 30 ABSRB STPL DISP INSORB ORDER VIA PHONE OR EMAIL

## (undated) DEVICE — (D)PREP SKN CHLRAPRP APPL 26ML -- CONVERT TO ITEM 371833

## (undated) DEVICE — STAPLER SKIN SQ 30 ABSRB STPL DISP INSORB

## (undated) DEVICE — PACK PROCEDURE SURG C SECT KT SMH

## (undated) DEVICE — STERILE POLYISOPRENE POWDER-FREE SURGICAL GLOVES: Brand: PROTEXIS

## (undated) DEVICE — REM POLYHESIVE ADULT PATIENT RETURN ELECTRODE: Brand: VALLEYLAB

## (undated) DEVICE — DEVON™ KNEE AND BODY STRAP 60" X 3" (1.5 M X 7.6 CM): Brand: DEVON

## (undated) DEVICE — 1LYRTR 16FR10ML 100%SILI SNAP: Brand: MEDLINE INDUSTRIES, INC.

## (undated) DEVICE — PAD,ABDOMINAL,5"X9",ST,LF,25/BX: Brand: MEDLINE INDUSTRIES, INC.

## (undated) DEVICE — SOLIDIFIER FLUID 3000 CC ABSORB